# Patient Record
Sex: MALE | Race: WHITE | NOT HISPANIC OR LATINO | Employment: FULL TIME | URBAN - METROPOLITAN AREA
[De-identification: names, ages, dates, MRNs, and addresses within clinical notes are randomized per-mention and may not be internally consistent; named-entity substitution may affect disease eponyms.]

---

## 2017-02-23 ENCOUNTER — ALLSCRIPTS OFFICE VISIT (OUTPATIENT)
Dept: OTHER | Facility: OTHER | Age: 52
End: 2017-02-23

## 2017-02-23 DIAGNOSIS — Z00.00 ENCOUNTER FOR GENERAL ADULT MEDICAL EXAMINATION WITHOUT ABNORMAL FINDINGS: ICD-10-CM

## 2017-02-23 DIAGNOSIS — Z13.29 ENCOUNTER FOR SCREENING FOR OTHER SUSPECTED ENDOCRINE DISORDER: ICD-10-CM

## 2017-02-23 DIAGNOSIS — Z12.5 ENCOUNTER FOR SCREENING FOR MALIGNANT NEOPLASM OF PROSTATE: ICD-10-CM

## 2017-02-23 DIAGNOSIS — Z13.220 ENCOUNTER FOR SCREENING FOR LIPOID DISORDERS: ICD-10-CM

## 2017-02-25 ENCOUNTER — GENERIC CONVERSION - ENCOUNTER (OUTPATIENT)
Dept: OTHER | Facility: OTHER | Age: 52
End: 2017-02-25

## 2017-02-25 LAB
AMBIG ABBREV CMP14 DEFAULT (HISTORICAL): NORMAL
AMBIG ABBREV LP DEFAULT (HISTORICAL): NORMAL
BASOPHILS # BLD AUTO: 0 X10E3/UL (ref 0–0.2)
BASOPHILS # BLD AUTO: 1 %
DEPRECATED RDW RBC AUTO: 13.4 % (ref 12.3–15.4)
EOSINOPHIL # BLD AUTO: 0.1 X10E3/UL (ref 0–0.4)
EOSINOPHIL # BLD AUTO: 2 %
HCT VFR BLD AUTO: 49.2 % (ref 37.5–51)
HGB BLD-MCNC: 16.5 G/DL (ref 12.6–17.7)
IMM.GRANULOCYTES (CD4/8) (HISTORICAL): 0 %
IMM.GRANULOCYTES (CD4/8) (HISTORICAL): 0 X10E3/UL (ref 0–0.1)
LYMPHOCYTES # BLD AUTO: 1.7 X10E3/UL (ref 0.7–3.1)
LYMPHOCYTES # BLD AUTO: 30 %
MCH RBC QN AUTO: 30.7 PG (ref 26.6–33)
MCHC RBC AUTO-ENTMCNC: 33.5 G/DL (ref 31.5–35.7)
MCV RBC AUTO: 92 FL (ref 79–97)
MONOCYTES # BLD AUTO: 0.3 X10E3/UL (ref 0.1–0.9)
MONOCYTES (HISTORICAL): 5 %
NEUTROPHILS # BLD AUTO: 3.5 X10E3/UL (ref 1.4–7)
NEUTROPHILS # BLD AUTO: 62 %
PLATELET # BLD AUTO: 246 X10E3/UL (ref 150–379)
RBC (HISTORICAL): 5.37 X10E6/UL (ref 4.14–5.8)
WBC # BLD AUTO: 5.6 X10E3/UL (ref 3.4–10.8)

## 2017-02-26 LAB
A/G RATIO (HISTORICAL): 2.1 (ref 1.1–2.5)
ALBUMIN SERPL BCP-MCNC: 4.9 G/DL (ref 3.5–5.5)
ALP SERPL-CCNC: 74 IU/L (ref 39–117)
ALT SERPL W P-5'-P-CCNC: 29 IU/L (ref 0–44)
AST SERPL W P-5'-P-CCNC: 38 IU/L (ref 0–40)
BILIRUB SERPL-MCNC: 1.5 MG/DL (ref 0–1.2)
BUN SERPL-MCNC: 13 MG/DL (ref 6–24)
BUN/CREA RATIO (HISTORICAL): 13 (ref 9–20)
CALCIUM SERPL-MCNC: 10.3 MG/DL (ref 8.7–10.2)
CHLORIDE SERPL-SCNC: 100 MMOL/L (ref 96–106)
CHOLEST SERPL-MCNC: 217 MG/DL (ref 100–199)
CO2 SERPL-SCNC: 26 MMOL/L (ref 18–29)
CREAT SERPL-MCNC: 0.98 MG/DL (ref 0.76–1.27)
EGFR AFRICAN AMERICAN (HISTORICAL): 101 ML/MIN/1.73
EGFR-AMERICAN CALC (HISTORICAL): 88 ML/MIN/1.73
GLUCOSE SERPL-MCNC: 107 MG/DL (ref 65–99)
HDLC SERPL-MCNC: 54 MG/DL
LDLC SERPL CALC-MCNC: 123 MG/DL (ref 0–99)
POTASSIUM SERPL-SCNC: 5 MMOL/L (ref 3.5–5.2)
PROSTATE SPECIFIC ANTIGEN (HISTORICAL): 0.7 NG/ML (ref 0–4)
SODIUM SERPL-SCNC: 144 MMOL/L (ref 134–144)
TOT. GLOBULIN, SERUM (HISTORICAL): 2.3 G/DL (ref 1.5–4.5)
TOTAL PROTEIN (HISTORICAL): 7.2 G/DL (ref 6–8.5)
TRIGL SERPL-MCNC: 199 MG/DL (ref 0–149)
TSH SERPL DL<=0.05 MIU/L-ACNC: 0.8 UIU/ML (ref 0.45–4.5)
VLDLC SERPL CALC-MCNC: 40 MG/DL (ref 5–40)

## 2017-05-01 ENCOUNTER — GENERIC CONVERSION - ENCOUNTER (OUTPATIENT)
Dept: OTHER | Facility: OTHER | Age: 52
End: 2017-05-01

## 2017-08-08 ENCOUNTER — GENERIC CONVERSION - ENCOUNTER (OUTPATIENT)
Dept: OTHER | Facility: OTHER | Age: 52
End: 2017-08-08

## 2018-01-12 NOTE — MISCELLANEOUS
Message  GI Reminder Recall [address]:   Date: 08/08/2017   Dear Randy Sutherland:     Review of our records shows you are due for the following: Colonoscopy  Our records indicate that you are due at this time to have a follow-up examination for a colonoscopy  As you now, these tests are done to prevent colon cancer, a very common disease in the [place] and responsible for the thousands of patient deaths each year  We at [facility] are concerned for your health, and would very much appreciate you getting in touch with us at your earliest convenience, Again, this examination is vital to your proper health maintenance and for the prevention of cancer  Please call the following office to schedule your appointment:   [address] [place] [address], [address], [address] [place], [place] [address]remi Gordon [phone]  We look forward to hearing from you!      Sincerely,     [facility]      Signatures   Electronically signed by : Morris Malagon, ; Aug  8 2017  2:49PM EST                       (Author)

## 2018-01-16 NOTE — PROGRESS NOTES
Assessment    1  Encounter for preventive health examination (V70 0) (Z00 00)   2  Encounter for prostate cancer screening (V76 44) (Z12 5)   3  Lipid screening (V77 91) (Z13 220)   4  Thyroid disorder screen (V77 0) (Z13 29)   5  Heme positive stool (792 1) (R19 5)   6  Colon cancer screening (V76 51) (Z12 11)   7  SOB (shortness of breath) (786 05) (R06 02)    Plan  Colon cancer screening, Heme positive stool    · 1 - Dario Duenas MD (Gastroenterology) Physician Referral  Consult Only: the expectation  is that the referring provider will communicate back to the patient on treatment options  Evaluation and Treatment: the expectation is that the referred to provider will  communicate back to the patient on treatment options  Status: Active - Retrospective By  Protocol Authorization  Requested for: [id number]  Care Summary provided  : Yes   · COLONOSCOPY; Status:Active - Retrospective By Protocol Authorization; Requested  for:[id number];   Encounter for prostate cancer screening    · (1) PSA (SCREEN) (Dx V76 44 Screen for Prostate Cancer); Status:Active - Retrospective  Authorization; Requested for:[id number]; Health Maintenance    · (1) CBC/PLT/DIFF; Status:Active - Retrospective Authorization; Requested  for:[id number];    · (1) COMPREHENSIVE METABOLIC PANEL; Status:Active - Retrospective Authorization; Requested for:[id number];   Lipid screening    · (1) LIPID PANEL, FASTING; Status:Active - Retrospective Authorization; Requested  for:[id number];   Rotator cuff tear    · Begin or continue regular aerobic exercise  Gradually work up to at least count1  sessions of dur1 of exercise a week ; Status:Complete;   Done: 54YXN7530   · We recommend that you bring your body mass index down to 26 ; Status:Complete;    Done: 23FEL7134   · You need to quit smoking ; Status:Complete;   Done: 67EUO4578  SOB (shortness of breath)    · EKG/ECG- POC; Status:Active - Perform Order,Retrospective By Protocol Authorization;   Requested for: [id number]; Thyroid disorder screen    · (1) TSH; Status:Active - Retrospective Authorization; Requested for:[id number]; Check EKG, BW  Colonoscopy referral      Discussion/Summary    1  HM-needs BW, colonoscopy  Advised Tdap  2  Heme positive stool-needs colonoscopy  3  SOB-probably deconditioning  Advised weight loss and exercise  4  Daily headaches-advised hydration  Recheck if persists  History of Present Illness  HM, Adult Male: The patient is being seen for a health maintenance evaluation  General Health: The patient's health since the last visit is described as good  He has regular dental visits  He complains of vision problems  Vision care includes an eye examination within the last year  He denies hearing loss  Immunizations status:  Dr Ish Navarro  Lifestyle:  He consumes a diverse and healthy diet  (Eats fruits, vegetables, whole grains  Limits red meat  Fish 1-2 per week  Dietary calcium drinks Lactaid  Coffee 1 daily  No soda  )   He does not exercise regularly  (plans on going back to the gym)   He uses tobacco  The patient Quit smoking cigarettes  Tobacco Use Duration: 1 cigar(s) per day  He consumes alcohol  He reports occasional alcohol use and drinking several drinks per week  Screening:   HPI: Trinidad Head has been in good health  He has no hospitalitions, surgeries, fractures  Thinks he broke his elbow a few years ago  No treatment  He had ulcerative colitis at age 3  He was hospitalized at [facility] for several months  Was on steroids  He had a colonoscopy at ages 34 and 37  Thinks Dr Hansa Bolton  Had 1 polyp? Saw dermatology Dr Abby Lyn ; had some facial lesions   No recent BW  Review of Systems    Cardiovascular: no chest pain and no palpitations  Respiratory: shortness of breath and Thinks related to being overweight  Genitourinary: no dysuria, no urinary hesitancy and no nocturia  Musculoskeletal: Has a history of low back for years   Had a sledding accident many years ago  Takes Percocet for flareups  , but no arthralgias  Active Problems    1  Frequent headaches (784 0) (R51)   2  Rotator cuff tear (840 4) (M75 100)    Family History  Mother    · Family history of lung cancer (V16 1) (Z80 1)  Father    · Family history of arthritis (V17 7) (Z82 61)  Grandmother    · Family history of cardiac disorder (V17 49) (Z82 49)    Social History    · Current every day smoker (305 1) (F17 200)   · Drinks coffee   · Occupation   ·   · Social drinker (V49 89) (Z78 9)    Current Meds   1  Endocet 5-325 MG Oral Tablet Recorded   2  Tylenol 325 MG Oral Tablet; TAKE 1 TO 2 TABLETS EVERY 6 HOURS AS NEEDED   Recorded    Allergies    1  No Known Drug Allergies    Vitals   Recorded: 51Ook6988 10:47PM Recorded: 14Tld8781 06:35PM   Heart Rate  90   Respiration  16   Systolic 857 760   Diastolic 78 86   Height  5 ft 7 in   Weight  185 lb    BMI Calculated  28 97   BSA Calculated  1 96     Physical Exam    Constitutional   General appearance: No acute distress, well appearing and well nourished  Head and Face   Head and face: Normal     Eyes   Conjunctiva and lids: No erythema, swelling or discharge  Pupils and irises: Equal, round, reactive to light  Ears, Nose, Mouth, and Throat   Otoscopic examination: Tympanic membranes translucent with normal light reflex  Canals patent without erythema  Lips, teeth, and gums: Normal, good dentition  Oropharynx: Normal with no erythema, edema, exudate or lesions  Neck   Neck: Supple, symmetric, trachea midline, no masses  Thyroid: Normal, no thyromegaly  Pulmonary   Respiratory effort: No increased work of breathing or signs of respiratory distress  Auscultation of lungs: Clear to auscultation  Cardiovascular   Auscultation of heart: Normal rate and rhythm, normal S1 and S2, no murmurs  Carotid pulses: 2+ bilaterally  Abdominal aorta: Normal     Femoral pulses: 2+ bilaterally      Pedal pulses: 2+ bilaterally  Peripheral vascular exam: Normal     Examination of extremities for edema and/or varicosities: Normal     Abdomen   Abdomen: Non-tender, no masses  Liver and spleen: No hepatomegaly or splenomegaly  Anus, perineum, and rectum: Normal sphincter tone, no masses, no prolapse  Stool sample for occult blood: Abnormal   The stool was positive for occult blood  Genitourinary   Digital rectal exam of prostate: Normal size, no masses  Lymphatic   Palpation of lymph nodes in neck: No lymphadenopathy  Palpation of lymph nodes in groin: No lymphadenopathy  Skin   Skin and subcutaneous tissue: Normal without rashes or lesions  Psychiatric   Judgment and insight: Normal     Mood and affect: Normal        Future Appointments    Date/Time Provider Specialty Site   03/01/2018 06:30 PM RAHAT Yeung   [address] [facility]     Signatures   Electronically signed by : RAHAT Castellanos ; Feb 23 2017 10:48PM EST                       (Author)

## 2018-01-22 VITALS
DIASTOLIC BLOOD PRESSURE: 78 MMHG | RESPIRATION RATE: 16 BRPM | SYSTOLIC BLOOD PRESSURE: 122 MMHG | HEART RATE: 90 BPM | WEIGHT: 185 LBS | BODY MASS INDEX: 29.03 KG/M2 | HEIGHT: 67 IN

## 2018-03-01 ENCOUNTER — APPOINTMENT (OUTPATIENT)
Dept: RADIOLOGY | Facility: MEDICAL CENTER | Age: 53
End: 2018-03-01
Payer: COMMERCIAL

## 2018-03-01 ENCOUNTER — OFFICE VISIT (OUTPATIENT)
Dept: FAMILY MEDICINE CLINIC | Facility: MEDICAL CENTER | Age: 53
End: 2018-03-01
Payer: COMMERCIAL

## 2018-03-01 VITALS
HEART RATE: 72 BPM | RESPIRATION RATE: 16 BRPM | SYSTOLIC BLOOD PRESSURE: 140 MMHG | DIASTOLIC BLOOD PRESSURE: 86 MMHG | WEIGHT: 186 LBS | BODY MASS INDEX: 29.13 KG/M2

## 2018-03-01 DIAGNOSIS — M79.671 FOOT PAIN, RIGHT: ICD-10-CM

## 2018-03-01 DIAGNOSIS — M25.551 HIP PAIN, ACUTE, RIGHT: Primary | ICD-10-CM

## 2018-03-01 DIAGNOSIS — E66.3 OVERWEIGHT: ICD-10-CM

## 2018-03-01 DIAGNOSIS — Z00.00 ROUTINE ADULT HEALTH MAINTENANCE: ICD-10-CM

## 2018-03-01 PROCEDURE — 73502 X-RAY EXAM HIP UNI 2-3 VIEWS: CPT

## 2018-03-01 PROCEDURE — 99396 PREV VISIT EST AGE 40-64: CPT | Performed by: FAMILY MEDICINE

## 2018-03-01 PROCEDURE — 73630 X-RAY EXAM OF FOOT: CPT

## 2018-03-01 NOTE — PROGRESS NOTES
Rupail Du is here for CPX  He complains of pain in both feet, right hip ,left elbow  Left elbow aggravated by lifting things  No swelling  Thinks he had fracture of his left elbow  A ladder collapsed on him  Right hip aggravated by walking   No pain at night  Feels a little stiff  Started recently  Both feet hurt when he gets up in the morning  GM had Rheumatoid arthritis  NO history  of psoriasis  HH: Eats fruits and vegetables  Caffeine 1 coffee daily  Alcohol 1-2 daily  Soda none  Exercise none regular; plans on getting back to the gym  Cigar smoker 1 daily  FH : Positive for stroke  Lung cancer  NO prostate cancer or colon cancer  Eye checkup Dec 2017    Dental checkup  Has hearing loss   I have reviewed the patient's medical history in detail and updated the computerized patient record  Review of Systems   Constitutional: Negative for fatigue  HENT: Negative for dental problem and tinnitus  Respiratory: Negative for cough, chest tightness and shortness of breath  Cardiovascular: Negative for chest pain and palpitations  Gastrointestinal: Negative  Still with discharge with boil on his buttock  Comes and goes  Genitourinary: Negative for decreased urine volume, difficulty urinating and frequency  Neurological: Negative for dizziness, syncope and headaches  Psychiatric/Behavioral: Negative for dysphoric mood  The patient is not nervous/anxious  O: /86 (Cuff Size: Standard)   Pulse 72   Resp 16   Wt 84 4 kg (186 lb)   BMI 29 13 kg/m²   BP by me 132/84  Physical Exam   Constitutional: He is oriented to person, place, and time  He appears well-developed and well-nourished  HENT:   Head: Normocephalic  Right Ear: External ear normal    Left Ear: External ear normal    Mouth/Throat: Oropharynx is clear and moist  No oropharyngeal exudate  Eyes: EOM are normal  Pupils are equal, round, and reactive to light  Neck: No thyromegaly present     Cardiovascular: Normal rate, regular rhythm, normal heart sounds and intact distal pulses  No bruits   Pulmonary/Chest: Effort normal and breath sounds normal    Abdominal: Soft  Bowel sounds are normal  He exhibits no mass  There is no hepatomegaly  There is no tenderness  Genitourinary: Prostate normal    Musculoskeletal: He exhibits no edema  Left elbow shows some tenderness over the left lateral epicondyle  No crepitus  Full range of motion  Right hip shows normal internal external rotation  No tenderness is palpable laterally  Both feet showed no tenderness over the plantar surface  There is some tenderness over the right MTP joint  No swelling no erythema  Lymphadenopathy:     He has no cervical adenopathy  Neurological: He is alert and oriented to person, place, and time  Skin: No rash noted  Assessment  1  Overweight-discussed diet and exercise  2   2   Left elbow pain-suspect lateral epicondylitis  Will avoid repetitive movements  Ice ibuprofen as needed  Could consider referral to Ortho if desired  3   Right hip pain-will check an x-ray; does not act like a bursitis  4  Right foot pain-due MTP joint pain will consider gout however may be arthritis  5  History of colitis-follow up with GI  Colonoscopy next year  6   HM-due for PSA and blood work  Plan  Check x-rays of the right hip and right foot  Check blood work including uric acid

## 2018-03-08 ENCOUNTER — TELEPHONE (OUTPATIENT)
Dept: FAMILY MEDICINE CLINIC | Facility: MEDICAL CENTER | Age: 53
End: 2018-03-08

## 2018-03-08 NOTE — TELEPHONE ENCOUNTER
----- Message from Jayden Long MD sent at 3/8/2018  9:57 AM EST -----  Notify the x-rays of his hip and foot are normal   We had talked about looking at a uric acid level to consider gout so we will await his blood work  If his uric acid is normal suggest he see Podiatry as we discussed  If the right hip and elbow pain persists would recommend referral to general Orthopedics

## 2018-03-12 LAB
HBA1C MFR BLD HPLC: 5.5 %
HBA1C MFR BLD HPLC: 5.5 %

## 2018-03-14 LAB
ALBUMIN SERPL-MCNC: 4.6 G/DL (ref 3.5–5.5)
ALBUMIN/GLOB SERPL: 1.9 {RATIO} (ref 1.2–2.2)
ALP SERPL-CCNC: 70 IU/L (ref 39–117)
ALT SERPL-CCNC: 16 IU/L (ref 0–44)
AMBIG ABBREV DEFAULT: NORMAL
AMBIG ABBREV DEFAULT: NORMAL
AST SERPL-CCNC: 26 IU/L (ref 0–40)
BASOPHILS # BLD AUTO: 0 X10E3/UL (ref 0–0.2)
BASOPHILS NFR BLD AUTO: 0 %
BILIRUB SERPL-MCNC: 0.9 MG/DL (ref 0–1.2)
BUN SERPL-MCNC: 19 MG/DL (ref 6–24)
BUN/CREAT SERPL: 21 (ref 9–20)
CALCIUM SERPL-MCNC: 10.1 MG/DL (ref 8.7–10.2)
CHLORIDE SERPL-SCNC: 102 MMOL/L (ref 96–106)
CHOLEST SERPL-MCNC: 177 MG/DL (ref 100–199)
CO2 SERPL-SCNC: 27 MMOL/L (ref 18–29)
CREAT SERPL-MCNC: 0.9 MG/DL (ref 0.76–1.27)
EOSINOPHIL # BLD AUTO: 0.1 X10E3/UL (ref 0–0.4)
EOSINOPHIL NFR BLD AUTO: 2 %
ERYTHROCYTE [DISTWIDTH] IN BLOOD BY AUTOMATED COUNT: 13.7 % (ref 12.3–15.4)
EST. AVERAGE GLUCOSE BLD GHB EST-MCNC: 111 MG/DL
GLOBULIN SER-MCNC: 2.4 G/DL (ref 1.5–4.5)
GLUCOSE SERPL-MCNC: 102 MG/DL (ref 65–99)
HBA1C MFR BLD: 5.5 % (ref 4.8–5.6)
HCT VFR BLD AUTO: 46.2 % (ref 37.5–51)
HDLC SERPL-MCNC: 53 MG/DL
HGB BLD-MCNC: 15.3 G/DL (ref 13–17.7)
IMM GRANULOCYTES # BLD: 0 X10E3/UL (ref 0–0.1)
IMM GRANULOCYTES NFR BLD: 0 %
LDLC SERPL CALC-MCNC: 103 MG/DL (ref 0–99)
LYMPHOCYTES # BLD AUTO: 1.5 X10E3/UL (ref 0.7–3.1)
LYMPHOCYTES NFR BLD AUTO: 29 %
MCH RBC QN AUTO: 30.5 PG (ref 26.6–33)
MCHC RBC AUTO-ENTMCNC: 33.1 G/DL (ref 31.5–35.7)
MCV RBC AUTO: 92 FL (ref 79–97)
MONOCYTES # BLD AUTO: 0.3 X10E3/UL (ref 0.1–0.9)
MONOCYTES NFR BLD AUTO: 7 %
NEUTROPHILS # BLD AUTO: 3 X10E3/UL (ref 1.4–7)
NEUTROPHILS NFR BLD AUTO: 62 %
PLATELET # BLD AUTO: 249 X10E3/UL (ref 150–379)
POTASSIUM SERPL-SCNC: 5 MMOL/L (ref 3.5–5.2)
PROT SERPL-MCNC: 7 G/DL (ref 6–8.5)
PSA SERPL-MCNC: 0.6 NG/ML (ref 0–4)
RBC # BLD AUTO: 5.02 X10E6/UL (ref 4.14–5.8)
SL AMB EGFR AFRICAN AMERICAN: 112 ML/MIN/1.73
SL AMB EGFR NON AFRICAN AMERICAN: 96 ML/MIN/1.73
SL AMB VLDL CHOLESTEROL CALC: 21 MG/DL (ref 5–40)
SODIUM SERPL-SCNC: 142 MMOL/L (ref 134–144)
TRIGL SERPL-MCNC: 105 MG/DL (ref 0–149)
TSH SERPL DL<=0.005 MIU/L-ACNC: 0.88 UIU/ML (ref 0.45–4.5)
URATE SERPL-MCNC: 5.6 MG/DL (ref 3.7–8.6)
WBC # BLD AUTO: 5 X10E3/UL (ref 3.4–10.8)

## 2019-01-09 ENCOUNTER — TELEPHONE (OUTPATIENT)
Dept: FAMILY MEDICINE CLINIC | Facility: MEDICAL CENTER | Age: 54
End: 2019-01-09

## 2019-01-09 DIAGNOSIS — Z13.0 SCREENING FOR DISORDER OF BLOOD AND BLOOD-FORMING ORGANS: Primary | ICD-10-CM

## 2019-01-09 DIAGNOSIS — Z12.5 PROSTATE CANCER SCREENING: ICD-10-CM

## 2019-01-09 DIAGNOSIS — Z13.29 THYROID DISORDER SCREEN: ICD-10-CM

## 2019-01-09 DIAGNOSIS — Z13.220 LIPID SCREENING: ICD-10-CM

## 2019-01-10 NOTE — TELEPHONE ENCOUNTER
LMOM with information below, patient was advised to call the office with any questions or concerns     Orders placed

## 2019-03-07 ENCOUNTER — OFFICE VISIT (OUTPATIENT)
Dept: FAMILY MEDICINE CLINIC | Facility: MEDICAL CENTER | Age: 54
End: 2019-03-07
Payer: COMMERCIAL

## 2019-03-07 VITALS
SYSTOLIC BLOOD PRESSURE: 130 MMHG | BODY MASS INDEX: 27.06 KG/M2 | DIASTOLIC BLOOD PRESSURE: 82 MMHG | HEART RATE: 70 BPM | RESPIRATION RATE: 14 BRPM | HEIGHT: 67 IN | WEIGHT: 172.38 LBS

## 2019-03-07 DIAGNOSIS — Z12.11 COLON CANCER SCREENING: Primary | ICD-10-CM

## 2019-03-07 PROCEDURE — 99396 PREV VISIT EST AGE 40-64: CPT | Performed by: FAMILY MEDICINE

## 2019-03-08 NOTE — PROGRESS NOTES
Shilpi Mesisna is here for CPX  Medical history unchanged  He says the joint pain got better  See notes last visit  HH: He joined the gym  ; tries to go 5 days a week  He lost 16 lbs  He cut back on the snack mika the nighttime eating  Quit  Smoking cigars  Occ coffee  No soda  Alcohol 1-2 drinks a day  FH: Father with heart disease  Mother with lung and breast cancer  No prostate cancer  SH Lives with daughter and wife  Colonoscopy 2017 apparently there was some inflammation in the left side of the colon and a follow-up was advised 1-2 years  Dr Josue Jaime    O: /82 (BP Location: Right arm, Patient Position: Sitting, Cuff Size: Standard)   Pulse 70   Resp 14   Ht 5' 7" (1 702 m)   Wt 78 2 kg (172 lb 6 oz)   BMI 27 00 kg/m²   Physical Exam   Constitutional: He is oriented to person, place, and time  He appears well-developed and well-nourished  HENT:   Head: Normocephalic  Right Ear: External ear normal    Left Ear: External ear normal    Mouth/Throat: Oropharynx is clear and moist    Eyes: Pupils are equal, round, and reactive to light  Conjunctivae and EOM are normal  No scleral icterus  Neck: Normal range of motion  Neck supple  Carotid bruit is not present  No thyromegaly present  Cardiovascular: Normal rate, regular rhythm, normal heart sounds and intact distal pulses  Pulmonary/Chest: Effort normal and breath sounds normal    Abdominal: Soft  Normal aorta and bowel sounds are normal  He exhibits no mass  There is no hepatosplenomegaly  There is no tenderness  No hernia  Genitourinary: Prostate normal  Rectal exam shows no mass and guaiac negative stool  Musculoskeletal: Normal range of motion  He exhibits no edema  Lymphadenopathy:        Head (right side): No submandibular and no occipital adenopathy present  Head (left side): No submandibular and no occipital adenopathy present  He has no cervical adenopathy          Right: No inguinal and no supraclavicular adenopathy present  Left: No inguinal and no supraclavicular adenopathy present  Neurological: He is alert and oriented to person, place, and time  Skin: No lesion and no rash noted  Psychiatric: He has a normal mood and affect  His behavior is normal      Assessment  1  Health maintenance-up-to-date  Due for colonoscopy  Had Tdap  DiscussedShingrix  2  Overweight-congratulated on his 16 lb weight loss as well as his continued efforts with regular exercise  Plan  Check blood work  Recheck 1 year

## 2019-10-12 PROBLEM — G89.29 CHRONIC LEFT SHOULDER PAIN: Status: ACTIVE | Noted: 2019-10-12

## 2019-10-12 PROBLEM — M25.512 CHRONIC LEFT SHOULDER PAIN: Status: ACTIVE | Noted: 2019-10-12

## 2019-10-15 VITALS
BODY MASS INDEX: 26.21 KG/M2 | WEIGHT: 167 LBS | DIASTOLIC BLOOD PRESSURE: 96 MMHG | SYSTOLIC BLOOD PRESSURE: 135 MMHG | HEIGHT: 67 IN | HEART RATE: 84 BPM

## 2019-10-15 DIAGNOSIS — M25.512 ARTHRALGIA OF LEFT ACROMIOCLAVICULAR JOINT: ICD-10-CM

## 2019-10-15 DIAGNOSIS — M25.512 ACUTE PAIN OF LEFT SHOULDER: Primary | ICD-10-CM

## 2019-10-15 PROCEDURE — 99203 OFFICE O/P NEW LOW 30 MIN: CPT | Performed by: PHYSICAL MEDICINE & REHABILITATION

## 2019-10-15 NOTE — PROGRESS NOTES
1  Acute pain of left shoulder  Ambulatory referral to Physical Therapy   2  Arthralgia of left acromioclavicular joint       Orders Placed This Encounter   Procedures    Ambulatory referral to Physical Therapy        Imaging Studies (I personally reviewed images in PACS and report):  Left shoulder x-rays dated 10/2/2019  These images show arthritic changes of the acromioclavicular joint  No evidence of acute osseous abnormality  Impression:  Left shoulder pain likely secondary to acromioclavicular joint arthropathy and less likely due to rotator cuff impingement  We discussed different treatment options and decided to proceed with physical therapy  He will take Tylenol as needed for pain  Of note, he had an injection by a family member who is an orthopedic surgeon  It sounds like it was a subacromial injection but I asked him to find out what it was targeted for  If he continues to have pain, and he did not have an AC joint injection through a family member, we can consider doing this on his next visit if he still has trouble  No follow-ups on file  HPI:  Roby Escalante is a 62 y o  male  who presents for evaluation of   Chief Complaint   Patient presents with    Left Shoulder - Pain       Onset/Mechanism: 10/2/2019- 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  Location: Over the lateral acromion  Quality: Aching  Radiation: There is pain that goes down to the wrist   Provocative: Any activities that involve the left shoulder  Severity: Pretty bad  Associated Symptoms: Weakness because of pain  Treatment so far: Steroid injection into the shoulder but he is not sure where  This was done by his brother who is an orthopedic hand surgeon  Review of Systems   Constitutional: Positive for activity change  Negative for fever  HENT: Negative for sore throat  Eyes: Negative for visual disturbance  Respiratory: Negative for shortness of breath  Cardiovascular: Negative for chest pain  Gastrointestinal: Negative for abdominal pain  Endocrine: Negative for polydipsia  Genitourinary: Negative for difficulty urinating  Musculoskeletal: Positive for arthralgias and joint swelling  Skin: Negative for rash  Allergic/Immunologic: Negative for immunocompromised state  Neurological: Positive for weakness  Negative for numbness  Hematological: Does not bruise/bleed easily  Psychiatric/Behavioral: Negative for confusion  Following history reviewed and updated:  History reviewed  No pertinent past medical history  Past Surgical History:   Procedure Laterality Date    COLONOSCOPY      Fiberoptic     Social History   Social History     Substance and Sexual Activity   Alcohol Use Yes    Comment: Social     Social History     Substance and Sexual Activity   Drug Use Yes    Types: Marijuana     Social History     Tobacco Use   Smoking Status Former Smoker     Family History   Problem Relation Age of Onset    Lung cancer Mother     Arthritis Father     Heart disease Family         Cardiac Disorder     No Known Allergies     Constitutional:  /96 (BP Location: Right arm, Patient Position: Sitting, Cuff Size: Standard)   Pulse 84   Ht 5' 7" (1 702 m)   Wt 75 8 kg (167 lb)   BMI 26 16 kg/m²    General: NAD  Eyes: Anicteric sclerae  Neck: Supple  Lungs: Unlabored breathing  Cardiovascular: No lower extremity edema  Skin: Intact without erythema  Neurologic: Sensation intact to light touch  Psychiatric: Mood and affect are appropriate  Left Shoulder Exam     Tenderness   The patient is experiencing tenderness in the acromioclavicular joint and acromion  Range of Motion   The patient has normal left shoulder ROM      Muscle Strength   Abduction: 4/5     Tests   Levy test: positive  Cross arm: positive  Impingement: positive  Sulcus: absent    Other   Erythema: absent  Scars: absent  Sensation: normal  Pulse: present Procedures - none for this visit

## 2019-10-22 LAB
ALBUMIN SERPL-MCNC: 4.8 G/DL (ref 3.5–5.5)
ALBUMIN/GLOB SERPL: 2.3 {RATIO} (ref 1.2–2.2)
ALP SERPL-CCNC: 62 IU/L (ref 39–117)
ALT SERPL-CCNC: 22 IU/L (ref 0–44)
AST SERPL-CCNC: 27 IU/L (ref 0–40)
BASOPHILS # BLD AUTO: 0 X10E3/UL (ref 0–0.2)
BASOPHILS NFR BLD AUTO: 0 %
BILIRUB SERPL-MCNC: 1.5 MG/DL (ref 0–1.2)
BUN SERPL-MCNC: 19 MG/DL (ref 6–24)
BUN/CREAT SERPL: 19 (ref 9–20)
CALCIUM SERPL-MCNC: 9.9 MG/DL (ref 8.7–10.2)
CHLORIDE SERPL-SCNC: 98 MMOL/L (ref 96–106)
CHOLEST SERPL-MCNC: 192 MG/DL (ref 100–199)
CO2 SERPL-SCNC: 25 MMOL/L (ref 20–29)
CREAT SERPL-MCNC: 1.01 MG/DL (ref 0.76–1.27)
EOSINOPHIL # BLD AUTO: 0.3 X10E3/UL (ref 0–0.4)
EOSINOPHIL NFR BLD AUTO: 4 %
ERYTHROCYTE [DISTWIDTH] IN BLOOD BY AUTOMATED COUNT: 14.6 % (ref 12.3–15.4)
GLOBULIN SER-MCNC: 2.1 G/DL (ref 1.5–4.5)
GLUCOSE SERPL-MCNC: 105 MG/DL (ref 65–99)
HCT VFR BLD AUTO: 47.4 % (ref 37.5–51)
HDLC SERPL-MCNC: 72 MG/DL
HGB BLD-MCNC: 15.8 G/DL (ref 13–17.7)
IMM GRANULOCYTES # BLD: 0 X10E3/UL (ref 0–0.1)
IMM GRANULOCYTES NFR BLD: 0 %
LDLC SERPL CALC-MCNC: 100 MG/DL (ref 0–99)
LYMPHOCYTES # BLD AUTO: 1.5 X10E3/UL (ref 0.7–3.1)
LYMPHOCYTES NFR BLD AUTO: 19 %
MCH RBC QN AUTO: 31.3 PG (ref 26.6–33)
MCHC RBC AUTO-ENTMCNC: 33.3 G/DL (ref 31.5–35.7)
MCV RBC AUTO: 94 FL (ref 79–97)
MONOCYTES # BLD AUTO: 0.4 X10E3/UL (ref 0.1–0.9)
MONOCYTES NFR BLD AUTO: 5 %
NEUTROPHILS # BLD AUTO: 5.8 X10E3/UL (ref 1.4–7)
NEUTROPHILS NFR BLD AUTO: 72 %
PLATELET # BLD AUTO: 307 X10E3/UL (ref 150–450)
POTASSIUM SERPL-SCNC: 4.8 MMOL/L (ref 3.5–5.2)
PROT SERPL-MCNC: 6.9 G/DL (ref 6–8.5)
PSA SERPL-MCNC: 0.6 NG/ML (ref 0–4)
RBC # BLD AUTO: 5.05 X10E6/UL (ref 4.14–5.8)
SL AMB EGFR AFRICAN AMERICAN: 96 ML/MIN/1.73
SL AMB EGFR NON AFRICAN AMERICAN: 83 ML/MIN/1.73
SL AMB VLDL CHOLESTEROL CALC: 20 MG/DL (ref 5–40)
SODIUM SERPL-SCNC: 139 MMOL/L (ref 134–144)
TRIGL SERPL-MCNC: 98 MG/DL (ref 0–149)
TSH SERPL DL<=0.005 MIU/L-ACNC: 1.39 UIU/ML (ref 0.45–4.5)
WBC # BLD AUTO: 8.1 X10E3/UL (ref 3.4–10.8)

## 2019-10-23 ENCOUNTER — EVALUATION (OUTPATIENT)
Dept: PHYSICAL THERAPY | Facility: MEDICAL CENTER | Age: 54
End: 2019-10-23
Payer: OTHER MISCELLANEOUS

## 2019-10-23 DIAGNOSIS — M25.512 ACUTE PAIN OF LEFT SHOULDER: Primary | ICD-10-CM

## 2019-10-23 PROCEDURE — 97161 PT EVAL LOW COMPLEX 20 MIN: CPT | Performed by: PHYSICAL THERAPIST

## 2019-10-23 PROCEDURE — G8990 OTHER PT/OT CURRENT STATUS: HCPCS | Performed by: PHYSICAL THERAPIST

## 2019-10-23 PROCEDURE — G8991 OTHER PT/OT GOAL STATUS: HCPCS | Performed by: PHYSICAL THERAPIST

## 2019-10-23 PROCEDURE — 97110 THERAPEUTIC EXERCISES: CPT | Performed by: PHYSICAL THERAPIST

## 2019-10-23 NOTE — PROGRESS NOTES
PT Evaluation     Today's date: 10/23/2019  Patient name: Chely Giron  : 1965  MRN: [id number]  Referring provider: Ankita Bauman DO  Dx:   Encounter Diagnosis     ICD-10-CM    1  Acute pain of left shoulder M25 512 Ambulatory referral to Physical Therapy                  Assessment  Assessment details: Pt is an alert and oriented 61 yo male, referred to out-pt PT with dx of acute L shoulder pain  Pt states injury occurred at work on 10/2/19 when he slipped on oil and landed on his L shoulder  Pt received injection which he states gave him mild relief with  X-rays show AC joint arthritis  Pt states most pain with reaching out to side and overhead  Pt states he has been working since injury, but fortunately work has been slow  Upon examination, pt demonstrates pain, tissue tenderness, decreased L shoulder strength and ROM, and (+) impingement tests and (+) bicep testing  Suspect possible prox bicep tear  Pt will benefit from skilled PT to address the deficits listed above    Thank you for your referral   Impairments: abnormal or restricted ROM, activity intolerance, impaired physical strength, lacks appropriate home exercise program, pain with function and scapular dyskinesis  Understanding of Dx/Px/POC: good   Prognosis: good    Plan  Patient would benefit from: skilled physical therapy  Planned modality interventions: thermotherapy: hydrocollator packs and cryotherapy  Planned therapy interventions: manual therapy, joint mobilization, neuromuscular re-education, postural training, patient education, therapeutic exercise, therapeutic activities, stretching, strengthening and home exercise program  Frequency: 2x week  Duration in visits: 12  Duration in weeks: 8  Plan of Care beginning date: 10/23/2019  Plan of Care expiration date: 2019  Treatment plan discussed with: patient        Subjective Evaluation    History of Present Illness  Mechanism of injury: trauma  Mechanism of injury: Pt fell on L shoulder at work on 10/2/19  Pain  Current pain ratin  At best pain ratin  At worst pain ratin  Quality: sharp  Relieving factors: ice  Aggravating factors: overhead activity    Hand dominance: right      Diagnostic Tests  X-ray: abnormal  Treatments  Current treatment: injection treatment and physical therapy  Patient Goals  Patient goals for therapy: decreased pain, increased strength and return to work          Objective     Observations     Additional Observation Details  Posture/observation: generally conditioned 61 yo male, mild forward head posture and scap protraction in sitting    Palpation     Additional Palpation Details  Palpation:  Pain/tenderness L ant shoulder/RTC insertion site and long head of bicep    Neurological Testing     Sensation     Shoulder   Left Shoulder   Intact: light touch    Right Shoulder   Intact: light touch    Additional Neurological Details  Pt reports radicular pain into L UE, mika L bicep    Active Range of Motion   Left Shoulder   Flexion: 108 degrees with pain  Extension: 55 degrees   Abduction: 75 degrees with pain  External rotation BTH: C4   Internal rotation BTB: T10     Right Shoulder   Flexion: 156 degrees   Extension: 60 degrees   Abduction: 162 degrees     Additional Active Range of Motion Details  Pt denies pain with IR/ER, and actually states those positions feel make pain better    Strength/Myotome Testing     Left Shoulder     Planes of Motion   Flexion: 4-   Extension: 5   Abduction: 4   Adduction: 4   External rotation at 0°: 4   Internal rotation at 0°: 4-     Isolated Muscles   Biceps: 3+   Triceps: 4-     Right Shoulder   Normal muscle strength    Isolated Muscles   Biceps: 4     Tests     Left Shoulder   Positive Hawkin's, Neer's and Speed's  Negative empty can                Precautions: None      Manual                                                                                   Exercise Diary  10/23            jessica AAROM 4-way x10ea            Wall slides 10"x10            Table slides             digiflex             wrist flex/ext 2# nv            AROM When able            IR stretch                                                                                                                                                                             Modalities  10/23            CP

## 2019-10-30 ENCOUNTER — OFFICE VISIT (OUTPATIENT)
Dept: PHYSICAL THERAPY | Facility: MEDICAL CENTER | Age: 54
End: 2019-10-30
Payer: OTHER MISCELLANEOUS

## 2019-10-30 DIAGNOSIS — M25.512 ACUTE PAIN OF LEFT SHOULDER: Primary | ICD-10-CM

## 2019-10-30 PROCEDURE — 97112 NEUROMUSCULAR REEDUCATION: CPT

## 2019-10-30 PROCEDURE — 97110 THERAPEUTIC EXERCISES: CPT

## 2019-10-30 NOTE — PROGRESS NOTES
Daily Note     Today's date: 10/30/2019  Patient name: Freida Ortiz  : 1965  MRN: [id number]  Referring provider: Elsie Leo DO  Dx:   Encounter Diagnosis     ICD-10-CM    1  Acute pain of left shoulder M25 512        Start Time: 1130  Stop Time: 1207  Total time in clinic (min): 37 minutes    Subjective: Patient states, "I went to the gym before this and I had min shoulder pain with bicep curls and triceps ext"  Objective: See treatment diary below    Assessment: Tolerated treatment fair  Patient would benefit from continued PT  Patient had pain with active shoulder abd (~45-90°) the rest of the range was "no problem"  Otherwise, no increase in pain during therapy today  His pain seems to be at his long head biceps origin  Plan: Continue per plan of care        Precautions: None    Manual                                                                                 Exercise Diary  10/23 10/30           Pulleys  5'           AAROM 4-way x10ea 10"  x10           Wall slides 10"x10 10"  x10           Table slides             digiflex  Black  5"x10           wrist flex/ext 2# nv 5#  3x10           AROM When able Flex/abd  x1 ea           IR stretch  10"x5                                                                                                                                                                         Modalities  10/23 10/30           CP  home

## 2019-11-04 ENCOUNTER — OFFICE VISIT (OUTPATIENT)
Dept: PHYSICAL THERAPY | Facility: MEDICAL CENTER | Age: 54
End: 2019-11-04
Payer: OTHER MISCELLANEOUS

## 2019-11-04 DIAGNOSIS — M25.512 ACUTE PAIN OF LEFT SHOULDER: Primary | ICD-10-CM

## 2019-11-04 PROCEDURE — 97110 THERAPEUTIC EXERCISES: CPT

## 2019-11-04 PROCEDURE — 97112 NEUROMUSCULAR REEDUCATION: CPT

## 2019-11-04 NOTE — PROGRESS NOTES
Daily Note     Today's date: 2019  Patient name: Cheli Santo  : 1965  MRN: [id number]  Referring provider: Martin Khoury DO  Dx:   Encounter Diagnosis     ICD-10-CM    1  Acute pain of left shoulder M25 512        Subjective: Patient reports after last session his bicep was very sore  Soreness lasted for about 2x days  He states he was at the gym this am and had pain in shoulder with certain UE movements  Objective: See treatment diary below    Assessment: Pt tolerated treatment fair  Pt continues to have pain with active shoulder abduction and flexion,noted at 90 degrees  Stretching and mild discomfort noted at end ranges of ARROM exercise  Pain continues to primarily be present in L bicep  Pt educated to be mindful of exercises at the gym and to not overdo UE exercise  Pt deffered use of CP in [facility], instructed pt to ice at home to improve sx's of soreness  Plan: Continue per plan of care      Pt 1:1 with PTA JW 10:30-11:00     Precautions: None    Manual                                                                                 Exercise Diary  10/23 10/30 11/4          Pulleys  5' 5'          AAROM 4-way x10ea 10"  x10 10"x10           Wall slides 10"x10 10"  x10 10"x10          Table slides   Flex/scap  10"x10          digiflex  Black  5"x10 Black  5"x15          wrist flex/ext 2# nv 5#  3x10 5#  3x10          AROM When able Flex/abd  x1 ea Flex/abd  x5 ea           IR stretch  10"x5 10'x5                                                                                                                                                                         Modalities  10/23 10/30 11/4          CP  home home

## 2019-11-06 ENCOUNTER — OFFICE VISIT (OUTPATIENT)
Dept: PHYSICAL THERAPY | Facility: MEDICAL CENTER | Age: 54
End: 2019-11-06
Payer: OTHER MISCELLANEOUS

## 2019-11-06 DIAGNOSIS — M25.512 ACUTE PAIN OF LEFT SHOULDER: Primary | ICD-10-CM

## 2019-11-06 PROCEDURE — 97112 NEUROMUSCULAR REEDUCATION: CPT | Performed by: PHYSICAL THERAPIST

## 2019-11-06 PROCEDURE — 97110 THERAPEUTIC EXERCISES: CPT | Performed by: PHYSICAL THERAPIST

## 2019-11-06 NOTE — PROGRESS NOTES
Daily Note     Today's date: 2019  Patient name: Alysha Juarez  : 1965  MRN: [id number]  Referring provider: Elsa Miller DO  Dx:   Encounter Diagnosis     ICD-10-CM    1  Acute pain of left shoulder M25 512        Start Time: 0800  Stop Time: 0837  Total time in clinic (min): 37 minutes    Subjective: pt states the shoulder is not feeling that bad today  He has not done much with the arm yet today and stated that the arm probably needed a break  Objective: See treatment diary below      Assessment: Tolerated treatment well  Pt had increased pain in the region of the biceps tendon, bicipital groove, anterior deltoid  Pt had increased pain with supine shoulder flexion that was reduced with duret pressure to the region  Pt had increased crepitus with AROM shoulder flexion and abduction  Addition of supine shoulder ABCs and serratus punches was completed to promote improvements in shoulder stability to decrease symptoms  Patient would benefit from continued PT      Plan: Continue per plan of care        Precautions: None  Pt 1:1 from 8:09-8:37  Manual                                                                                 Exercise Diary  10/23 10/30 11/4 11/6         Pulleys  5' 5' 5'         AAROM 4-way x10ea 10"  x10 10"x10  10"x10         Wall slides 10"x10 10"  x10 10"x10 10"x10         Table slides   Flex/scap  10"x10 Flex/scap  10"x10         digiflex  Black  5"x10 Black  5"x15 Black 5" x15         wrist flex/ext 2# nv 5#  3x10 5#  3x10 5# 3x10         AROM When able Flex/abd  x1 ea Flex/abd  x5 ea  Flex/abd x10         IR stretch  10"x5 10'x5  10"x5         Supine ABCs    x1         Supine serratus punches    2x10                                                                                                                                             Modalities  10/23 10/30 11/4 11/6         CP  home home home

## 2019-11-11 ENCOUNTER — OFFICE VISIT (OUTPATIENT)
Dept: PHYSICAL THERAPY | Facility: MEDICAL CENTER | Age: 54
End: 2019-11-11
Payer: OTHER MISCELLANEOUS

## 2019-11-11 DIAGNOSIS — M25.512 ACUTE PAIN OF LEFT SHOULDER: Primary | ICD-10-CM

## 2019-11-11 PROCEDURE — G8990 OTHER PT/OT CURRENT STATUS: HCPCS | Performed by: PHYSICAL THERAPIST

## 2019-11-11 PROCEDURE — 97110 THERAPEUTIC EXERCISES: CPT | Performed by: PHYSICAL THERAPIST

## 2019-11-11 PROCEDURE — G8991 OTHER PT/OT GOAL STATUS: HCPCS | Performed by: PHYSICAL THERAPIST

## 2019-11-11 NOTE — PROGRESS NOTES
Daily Note     Today's date: 2019  Patient name: Karen Kilgore  : 1965  MRN: [id number]  Referring provider: Bryson Mendez DO  Dx:   Encounter Diagnosis     ICD-10-CM    1  Acute pain of left shoulder M25 512                   Subjective: Pt states he "tweaked" his shoulder when he almost dropped something out of his fridge and went to grab it the other day, and has been having increased ant shoulder soreness since  Pt states he has f/w with MD tomorrow  Objective: See treatment diary below      Assessment: Tolerated treatment well  Patient would benefit from continued PT   Pt has most pain with 0-30 degrees of flex/abd AROM  Pt cont to state he feels improved ROM with decreased pain if he applies pressure to prox bicep tendon while performing AROM  Plan: Continue per plan of care  Precautions: None    Manual              Multi-angle RS x4'                                                                  Exercise Diary  10/23 10/30 11/4 11/6 11/11        Pulleys  5' 5' 5' 5'        AAROM 4-way x10ea 10"  x10 10"x10  10"x10 10"x10        Wall slides 10"x10 10"  x10 10"x10 10"x10         Table slides   Flex/scap  10"x10 Flex/scap  10"x10         digiflex  Black  5"x10 Black  5"x15 Black 5" x15 NP        wrist flex/ext 2# nv 5#  3x10 5#  3x10 5# 3x10 NP        AROM When able Flex/abd  x1 ea Flex/abd  x5 ea  Flex/abd x10         IR stretch  10"x5 10'x5  10"x5 10"x5        Supine ABCs    x1 x1        Supine serratus punches    2x10 x20        S/l ER     5"x10        S/l abd     P!         Supine flex  degrees     x15        Prone ext     5"x15        Prone retract     5"x15        Prone abd                                                                   Modalities  10/23 10/30 11/4 11/6         CP  home home home

## 2019-11-12 ENCOUNTER — OFFICE VISIT (OUTPATIENT)
Dept: OBGYN CLINIC | Facility: CLINIC | Age: 54
End: 2019-11-12
Payer: OTHER MISCELLANEOUS

## 2019-11-12 VITALS
BODY MASS INDEX: 25.34 KG/M2 | SYSTOLIC BLOOD PRESSURE: 150 MMHG | HEIGHT: 68 IN | HEART RATE: 87 BPM | WEIGHT: 167.2 LBS | DIASTOLIC BLOOD PRESSURE: 90 MMHG

## 2019-11-12 DIAGNOSIS — M25.512 CHRONIC LEFT SHOULDER PAIN: ICD-10-CM

## 2019-11-12 DIAGNOSIS — M75.52 SUBACROMIAL BURSITIS OF LEFT SHOULDER JOINT: ICD-10-CM

## 2019-11-12 DIAGNOSIS — G89.29 CHRONIC LEFT SHOULDER PAIN: ICD-10-CM

## 2019-11-12 DIAGNOSIS — M25.512 ARTHRALGIA OF LEFT ACROMIOCLAVICULAR JOINT: Primary | ICD-10-CM

## 2019-11-12 PROCEDURE — 99213 OFFICE O/P EST LOW 20 MIN: CPT | Performed by: PHYSICAL MEDICINE & REHABILITATION

## 2019-11-12 PROCEDURE — 20610 DRAIN/INJ JOINT/BURSA W/O US: CPT | Performed by: PHYSICAL MEDICINE & REHABILITATION

## 2019-11-12 RX ORDER — LIDOCAINE HYDROCHLORIDE 10 MG/ML
5 INJECTION, SOLUTION INFILTRATION; PERINEURAL
Status: COMPLETED | OUTPATIENT
Start: 2019-11-12 | End: 2019-11-12

## 2019-11-12 RX ORDER — TRIAMCINOLONE ACETONIDE 40 MG/ML
80 INJECTION, SUSPENSION INTRA-ARTICULAR; INTRAMUSCULAR
Status: COMPLETED | OUTPATIENT
Start: 2019-11-12 | End: 2019-11-12

## 2019-11-12 RX ADMIN — TRIAMCINOLONE ACETONIDE 80 MG: 40 INJECTION, SUSPENSION INTRA-ARTICULAR; INTRAMUSCULAR at 09:20

## 2019-11-12 RX ADMIN — LIDOCAINE HYDROCHLORIDE 5 ML: 10 INJECTION, SOLUTION INFILTRATION; PERINEURAL at 09:20

## 2019-11-12 NOTE — PATIENT INSTRUCTIONS
You had a cortisone (steroid) injection  There are two medicines in this injection, a numbing medicine and a steroid  The numbing medication component usually takes effect within a few minutes and wears off after a few hours, after which your pain may return  The steroid medication typically takes effect in 3-5 days, although some people have benefits sooner, and lasts for a much longer time

## 2019-11-12 NOTE — PROGRESS NOTES
1  Arthralgia of left acromioclavicular joint     2  Chronic left shoulder pain     3  Subacromial bursitis of left shoulder joint       No orders of the defined types were placed in this encounter  Imaging Studies (I personally reviewed images in PACS and report):  Left shoulder x-rays dated 10/2/2019  These images show arthritic changes of the acromioclavicular joint  No evidence of acute osseous abnormality  Impression:  Patient is here in follow up of left subacromial bursitis and acromioclavicular joint arthropathy with date of injury 10/2/2019  He has been doing well with physical therapy but continues to have symptoms and signs of subacromial impingement  We decided to proceed with a steroid injection of the subacromial region  The patient reported immediate relief of his symptoms afterwards although he still had some  I will see him back in about 4-5 weeks to see how things are going  No follow-ups on file  HPI:  Beba Cabrera is a 62 y o  male  who presents in follow up  Here for No chief complaint on file  Date of injury:  10/2/2019  Trajectory of symptoms:  Slightly improved since his last visit, he has had about 4 sessions of physical therapy  Review of Systems   Constitutional: Positive for activity change  Negative for fever  HENT: Negative for sore throat  Eyes: Negative for visual disturbance  Respiratory: Negative for shortness of breath  Cardiovascular: Negative for chest pain  Gastrointestinal: Negative for abdominal pain  Endocrine: Negative for polydipsia  Genitourinary: Negative for difficulty urinating  Musculoskeletal: Positive for arthralgias  Skin: Negative for rash  Allergic/Immunologic: Negative for immunocompromised state  Neurological: Negative for weakness and numbness  Hematological: Does not bruise/bleed easily  Psychiatric/Behavioral: Negative for confusion  Following history reviewed and updated:  History reviewed  No pertinent past medical history  Past Surgical History:   Procedure Laterality Date    COLONOSCOPY      Fiberoptic     Social History   Social History     Substance and Sexual Activity   Alcohol Use Yes    Comment: Social     Social History     Substance and Sexual Activity   Drug Use Yes    Types: Marijuana     Social History     Tobacco Use   Smoking Status Former Smoker   Smokeless Tobacco Never Used     Family History   Problem Relation Age of Onset    Lung cancer Mother     Arthritis Father     Heart disease Family         Cardiac Disorder     No Known Allergies     Constitutional:  /90 (BP Location: Right arm, Patient Position: Sitting, Cuff Size: Standard)   Pulse 87   Ht 5' 8" (1 727 m)   Wt 75 8 kg (167 lb 3 2 oz)   BMI 25 42 kg/m²    General: NAD  Eyes: Clear sclerae  ENT: No inflammation, lesion, or mass of lips  No tracheal deviation  Musculoskeletal: As mentioned below  Integumentary: No visible rashes or skin lesions  Pulmonary/Chest: Effort normal  No respiratory distress  Neuro: CN's grossly intact, ROCK  Psych: Normal affect and judgement  Vascular: WWP  Left Shoulder Exam     Tenderness   The patient is experiencing tenderness in the acromion      Range of Motion   Active abduction: normal   Passive abduction: normal     Muscle Strength   Abduction: 4/5   Internal rotation: 4/5   External rotation: 4/5     Tests   Levy test: positive  Cross arm: positive  Impingement: positive  Sulcus: absent    Other   Erythema: absent  Scars: absent  Sensation: normal  Pulse: present              Large joint arthrocentesis: L subacromial bursa  Date/Time: 11/12/2019 9:20 AM  Consent given by: patient  Supporting Documentation  Indications: pain   Procedure Details  Location: shoulder - L subacromial bursa  Ultrasound guidance: no  Approach: posterolateral  Medications administered: 5 mL lidocaine 1 %; 80 mg triamcinolone acetonide 40 mg/mL    Patient tolerance: patient tolerated the procedure well with no immediate complications  Dressing:  Sterile dressing applied    Discussed risk of infection, bleeding, fat atrophy/pigmentation, pain/steroid flare and increased blood sugars  Patient aware and expressed understanding

## 2019-11-13 ENCOUNTER — OFFICE VISIT (OUTPATIENT)
Dept: PHYSICAL THERAPY | Facility: MEDICAL CENTER | Age: 54
End: 2019-11-13
Payer: OTHER MISCELLANEOUS

## 2019-11-13 DIAGNOSIS — M25.512 ACUTE PAIN OF LEFT SHOULDER: Primary | ICD-10-CM

## 2019-11-13 PROCEDURE — 97110 THERAPEUTIC EXERCISES: CPT | Performed by: PHYSICAL THERAPIST

## 2019-11-13 PROCEDURE — 97140 MANUAL THERAPY 1/> REGIONS: CPT | Performed by: PHYSICAL THERAPIST

## 2019-11-13 NOTE — PROGRESS NOTES
Daily Note     Today's date: 2019  Patient name: Kee Ventura  : 1965  MRN: [id number]  Referring provider: Nakul Smith DO  Dx:   Encounter Diagnosis     ICD-10-CM    1  Acute pain of left shoulder M25 512                   Subjective: Pt states he saw MD yesterday, and received cortisone injection  Pt states today was also his first day back to work in 3 weeks, and did have to carry ladders using mostly R UE  Objective: See treatment diary below      Assessment: Tolerated treatment well  Patient would benefit from continued PT   Due to injection yesterday, held all strengthening and performed PROM  Resume TE nv as able  Plan: Continue per plan of care  Precautions: None    Manual             Multi-angle RS x4'            PROM L shoulder  x10'                                                    Exercise Diary  10/23 10/30 11/4 11/6 11/11 11/13       Pulleys  5' 5' 5' 5' 5"       AAROM 4-way x10ea 10"  x10 10"x10  10"x10 10"x10 10"x10ea       Wall slides 10"x10 10"  x10 10"x10 10"x10  10"x10       Table slides   Flex/scap  10"x10 Flex/scap  10"x10 10"x10ea 10"x10ea       digiflex  Black  5"x10 Black  5"x15 Black 5" x15 NP        wrist flex/ext 2# nv 5#  3x10 5#  3x10 5# 3x10 NP        AROM When able Flex/abd  x1 ea Flex/abd  x5 ea  Flex/abd x10         IR stretch  10"x5 10'x5  10"x5 10"x5 10"x5       Supine ABCs    x1 x1        Supine serratus punches    2x10 x20        S/l ER     5"x10        S/l abd     P!         Supine flex  degrees     x15        Prone ext     5"x15        Prone retract     5"x15        Prone abd                                                                   Modalities  10/23 10/30 11/4 11/6         CP  home home home

## 2019-11-18 ENCOUNTER — OFFICE VISIT (OUTPATIENT)
Dept: PHYSICAL THERAPY | Facility: MEDICAL CENTER | Age: 54
End: 2019-11-18
Payer: OTHER MISCELLANEOUS

## 2019-11-18 DIAGNOSIS — M25.512 ACUTE PAIN OF LEFT SHOULDER: Primary | ICD-10-CM

## 2019-11-18 PROCEDURE — 97140 MANUAL THERAPY 1/> REGIONS: CPT | Performed by: PHYSICAL THERAPIST

## 2019-11-18 PROCEDURE — 97110 THERAPEUTIC EXERCISES: CPT | Performed by: PHYSICAL THERAPIST

## 2019-11-18 NOTE — PROGRESS NOTES
Daily Note     Today's date: 2019  Patient name: Roby Escalante  : 1965  MRN: [id number]  Referring provider: Adrián Rivera DO  Dx:   Encounter Diagnosis     ICD-10-CM    1  Acute pain of left shoulder M25 512                   Subjective: Pt states he crossed his arms yesterday and felt severe pain in l ant shoulder  Pt denies hearing any audible noise  Pt states he iced it 5 hrs later, but doesn't notice any swelling or bruising  Pt states he doesn't have f/u with MD until 2nd week of dec  Will cont PT until f/u, and perform RA prior  Objective: See treatment diary below      Assessment: Tolerated treatment well  Patient would benefit from continued PT   Initiated gentle isomerics for cuff strengthening today and advised to cont for HEP  Plan: Continue per plan of care  Precautions: None    Manual            Multi-angle RS x4'            PROM L shoulder  x10' x10'                                                   Exercise Diary  10/23 10/30 11/4 11/6 11/11 11/13 11/18      Pulleys  5' 5' 5' 5' 5" x5'      AAROM 4-way x10ea 10"  x10 10"x10  10"x10 10"x10 10"x10ea 10"x10      Wall slides 10"x10 10"  x10 10"x10 10"x10  10"x10 10"x10      Table slides   Flex/scap  10"x10 Flex/scap  10"x10 10"x10ea 10"x10ea NP      digiflex  Black  5"x10 Black  5"x15 Black 5" x15 NP        wrist flex/ext 2# nv 5#  3x10 5#  3x10 5# 3x10 NP        AROM When able Flex/abd  x1 ea Flex/abd  x5 ea  Flex/abd x10         IR stretch  10"x5 10'x5  10"x5 10"x5 10"x5 10"x4      Supine ABCs    x1 x1        Supine serratus punches    2x10 x20        S/l ER     5"x10        S/l abd     P!         Supine flex  degrees     x15        Prone ext     5"x15        Prone retract     5"x15        Prone abd             4-way isometrics       5"x10ea                                               Modalities  10/23 10/30 11/4 11/6         CP  home home home

## 2019-11-20 ENCOUNTER — OFFICE VISIT (OUTPATIENT)
Dept: PHYSICAL THERAPY | Facility: MEDICAL CENTER | Age: 54
End: 2019-11-20
Payer: OTHER MISCELLANEOUS

## 2019-11-20 DIAGNOSIS — M25.512 ACUTE PAIN OF LEFT SHOULDER: Primary | ICD-10-CM

## 2019-11-20 PROCEDURE — 97140 MANUAL THERAPY 1/> REGIONS: CPT | Performed by: PHYSICAL THERAPIST

## 2019-11-20 PROCEDURE — 97110 THERAPEUTIC EXERCISES: CPT | Performed by: PHYSICAL THERAPIST

## 2019-11-20 NOTE — PROGRESS NOTES
Daily Note     Today's date: 2019  Patient name: Karen Kilgore  : 1965  MRN: [id number]  Referring provider: Bryson Mendez DO  Dx:   Encounter Diagnosis     ICD-10-CM    1  Acute pain of left shoulder M25 512                   Subjective: Pt states at times he feels as "tight band" sensation around L upper arm that feels like a "tightening"  Pt states it's a weird feeling, and notices it when reaching over head but can occur at rest         Objective: See treatment diary below      Assessment: Tolerated treatment well  Patient would benefit from continued PT   Pt states overall reduction in pain with all AROM/PROM activities  Plan: Continue per plan of care  Precautions: None    Manual           Multi-angle RS x4'            PROM L shoulder  x10' x10' x10'                                                  Exercise Diary  10/23 10/30 11/4 11/6 11/11 11/13 11/18 11/20     Pulleys  5' 5' 5' 5' 5" x5' x5'     AAROM 4-way x10ea 10"  x10 10"x10  10"x10 10"x10 10"x10ea 10"x10 10"x10ea     Wall slides 10"x10 10"  x10 10"x10 10"x10  10"x10 10"x10 10"x10     Table slides   Flex/scap  10"x10 Flex/scap  10"x10 10"x10ea 10"x10ea NP      digiflex  Black  5"x10 Black  5"x15 Black 5" x15 NP        wrist flex/ext 2# nv 5#  3x10 5#  3x10 5# 3x10 NP        AROM When able Flex/abd  x1 ea Flex/abd  x5 ea  Flex/abd x10         IR stretch  10"x5 10'x5  10"x5 10"x5 10"x5 10"x4 15"x4     Supine ABCs    x1 x1        Supine serratus punches    2x10 x20        S/l ER     5"x10        S/l abd     P!         Supine flex  degrees     x15        Prone ext     5"x15   5"x20     Prone retract     5"x15   5"x20     Prone abd             4-way isometrics       5"x10ea 5"x10ea     UBE        nv     TB ext/retract        nv     TB IR/ER        nv       Modalities  10/23 10/30 11/4 11/6         CP  home home home

## 2019-11-25 ENCOUNTER — OFFICE VISIT (OUTPATIENT)
Dept: PHYSICAL THERAPY | Facility: MEDICAL CENTER | Age: 54
End: 2019-11-25
Payer: OTHER MISCELLANEOUS

## 2019-11-25 DIAGNOSIS — M25.512 ACUTE PAIN OF LEFT SHOULDER: Primary | ICD-10-CM

## 2019-11-25 PROCEDURE — 97110 THERAPEUTIC EXERCISES: CPT

## 2019-11-25 PROCEDURE — 97140 MANUAL THERAPY 1/> REGIONS: CPT

## 2019-11-25 NOTE — PROGRESS NOTES
Daily Note     Today's date: 2019  Patient name: Kee Ventura  : 1965  MRN: [id number]  Referring provider: Nakul Smith DO  Dx:   Encounter Diagnosis     ICD-10-CM    1  Acute pain of left shoulder M25 512                   Subjective: Pt reports no discomfort working today  1:1 35 min        Objective: See treatment diary below  Precautions: None    Manual          Multi-angle RS x4'            PROM L shoulder  x10' x10' x10' HS x10'                                                 Exercise Diary  10/23 10/30 11/4 11/6 11/11 11/13 11/18 11/20 11/25    Pulleys  5' 5' 5' 5' 5" x5' x5' NP    AAROM 4-way x10ea 10"  x10 10"x10  10"x10 10"x10 10"x10ea 10"x10 10"x10ea 10"x10    Wall slides 10"x10 10"  x10 10"x10 10"x10  10"x10 10"x10 10"x10 10"x10    Table slides   Flex/scap  10"x10 Flex/scap  10"x10 10"x10ea 10"x10ea NP      digiflex  Black  5"x10 Black  5"x15 Black 5" x15 NP        wrist flex/ext 2# nv 5#  3x10 5#  3x10 5# 3x10 NP        AROM When able Flex/abd  x1 ea Flex/abd  x5 ea  Flex/abd x10         IR stretch  10"x5 10'x5  10"x5 10"x5 10"x5 10"x4 15"x4 15" x4    Supine ABCs    x1 x1        Supine serratus punches    2x10 x20        S/l ER     5"x10        S/l abd     P! Supine flex  degrees     x15        Prone ext     5"x15   5"x20  5" x20   Prone retract     5"x15   5"x20  5"x20   Prone abd             4-way isometrics       5"x10ea 5"x10ea     UBE        nv 5' GTB 2x10   TB ext/retract        nv  GTB 2x10   TB IR/ER        nv  GTB 2x10     Modalities  10/23 10/30 11/4 11/6         CP  home home home                                            Assessment: Pt did well with all TE as listed, reports no increased pain  Fatigued with prone Cindy    Plan: Continue per plan of care

## 2019-11-27 ENCOUNTER — OFFICE VISIT (OUTPATIENT)
Dept: PHYSICAL THERAPY | Facility: MEDICAL CENTER | Age: 54
End: 2019-11-27
Payer: OTHER MISCELLANEOUS

## 2019-11-27 DIAGNOSIS — M25.512 ACUTE PAIN OF LEFT SHOULDER: Primary | ICD-10-CM

## 2019-11-27 PROCEDURE — 97110 THERAPEUTIC EXERCISES: CPT | Performed by: PHYSICAL THERAPIST

## 2019-11-27 PROCEDURE — 97112 NEUROMUSCULAR REEDUCATION: CPT | Performed by: PHYSICAL THERAPIST

## 2019-11-27 PROCEDURE — 97140 MANUAL THERAPY 1/> REGIONS: CPT | Performed by: PHYSICAL THERAPIST

## 2019-11-27 NOTE — PROGRESS NOTES
Daily Note     Today's date: 2019  Patient name: Janey Read  : 1965  MRN: [id number]  Referring provider: Sam Diez DO  Dx:   Encounter Diagnosis     ICD-10-CM    1  Acute pain of left shoulder M25 512        Start Time: 1030  Stop Time: 1105  Total time in clinic (min): 35 minutes    Subjective: pt states that the shoulder pain is pretty much gone  His biggest complaint is that he cannot find a comfortably position to sleep in  Objective: See treatment diary below      Assessment: Tolerated treatment well  Pt completed all exercises with no complaints of pain  Increase in resistance was tolerated well with improvements in complaints of symptoms that are usually felt with supine cane flexion  Continue to monitor and progress as tolerated  Patient would benefit from continued PT      Plan: Continue per plan of care  Precautions: None  Pt 1:1 from 10:33-11:05  Manual         Multi-angle RS x4'            PROM L shoulder  x10' x10' x10' HS x10' 8'                                                Exercise Diary  11/27 10/30 11/4 11/6 11/11 11/13 11/18 11/20 11/25    Pulleys  5' 5' 5' 5' 5" x5' x5' NP    AAROM 4-way  10"  x10 10"x10  10"x10 10"x10 10"x10ea 10"x10 10"x10ea 10"x10    Wall slides 10"x10 10"  x10 10"x10 10"x10  10"x10 10"x10 10"x10 10"x10    Table slides -  Flex/scap  10"x10 Flex/scap  10"x10 10"x10ea 10"x10ea NP      digiflex - Black  5"x10 Black  5"x15 Black 5" x15 NP        wrist flex/ext - 5#  3x10 5#  3x10 5# 3x10 NP        AROM - Flex/abd  x1 ea Flex/abd  x5 ea  Flex/abd x10         IR stretch - 10"x5 10'x5  10"x5 10"x5 10"x5 10"x4 15"x4 15" x4    Supine ABCs -   x1 x1        Supine serratus punches -   2x10 x20        S/l ER 5"2x10 #1    5"x10        S/l abd     P!         Supine flex  degrees Cane #2 2x10    x15        Prone ext 5"x20 #1    5"x15   5"x20  5" x20   Prone retract 5"x20 #1    5"x15   5"x20  5"x20   Prone abd 5"x20 #1            4-way isometrics       5"x10ea 5"x10ea     UBE 5'       nv 5' GTB 2x10   TB ext/retract gtb 2x10       nv  GTB 2x10   TB IR/ER gtb 2x10       nv  GTB 2x10     Modalities  10/23 10/30 11/4 11/6         CP  home home home

## 2019-12-02 ENCOUNTER — APPOINTMENT (OUTPATIENT)
Dept: PHYSICAL THERAPY | Facility: MEDICAL CENTER | Age: 54
End: 2019-12-02
Payer: OTHER MISCELLANEOUS

## 2019-12-04 ENCOUNTER — EVALUATION (OUTPATIENT)
Dept: PHYSICAL THERAPY | Facility: MEDICAL CENTER | Age: 54
End: 2019-12-04
Payer: OTHER MISCELLANEOUS

## 2019-12-04 DIAGNOSIS — M25.512 ACUTE PAIN OF LEFT SHOULDER: Primary | ICD-10-CM

## 2019-12-04 PROCEDURE — G8991 OTHER PT/OT GOAL STATUS: HCPCS | Performed by: PHYSICAL THERAPIST

## 2019-12-04 PROCEDURE — 97110 THERAPEUTIC EXERCISES: CPT | Performed by: PHYSICAL THERAPIST

## 2019-12-04 PROCEDURE — 97140 MANUAL THERAPY 1/> REGIONS: CPT | Performed by: PHYSICAL THERAPIST

## 2019-12-04 PROCEDURE — 97112 NEUROMUSCULAR REEDUCATION: CPT | Performed by: PHYSICAL THERAPIST

## 2019-12-04 PROCEDURE — G8990 OTHER PT/OT CURRENT STATUS: HCPCS | Performed by: PHYSICAL THERAPIST

## 2019-12-04 NOTE — PROGRESS NOTES
PT Re-Evaluation     Today's date: 2019  Patient name: Chely Giron  : 1965  MRN: [id number]  Referring provider: Ankita Bauman DO  Dx:   Encounter Diagnosis     ICD-10-CM    1  Acute pain of left shoulder M25 [id number]                   Assessment  Assessment details: Pt has attended 11 visits of skilled PT over the past 6 weeks  Pt is making steady progress with decreased pain, and improved ROM  Livier Console Pt states if he lifts close to his body it feels Okay, however tried bench pressing yesterday and was very painful and weak  Upon reassessment, pt demonstrates decreased pain and tissue tenderness, and improved L shoulder strength and ROM, and (+) impingement tests and (+) bicep testing persists  Suspect possible prox bicep tear, however pain sx's and overall function is improving  Pt in agreement with placing PT on hold as he has returned to gym and is working full-time, and is aware of what his limitations are with lifting  Pt has f/u with MD in 2 weeksThank you for your referral   Impairments: activity intolerance, impaired physical strength and pain with function  Understanding of Dx/Px/POC: good   Prognosis: good    Goals  ST: Decrease pain by 25% in 4 weeks with all functional activities  2: Improved ROM by 25% in 4 weeks to assist with all functional activities  3: Improve strength by 1/2 grade in 4 weeks to assist with all functional activities  LT:  Decrease pain to 0-1/10 with all functional activities in 4-6 weeks-met as of   2: Improved ROM to WFL's in 4-6 weeks to assist with all functional activities-met as of   3:  Increase strength to WFL's in 4-6 weeks to assist with all functional activities-partially met as of     Plan  Patient would benefit from: skilled physical therapy  Planned modality interventions: thermotherapy: hydrocollator packs and cryotherapy  Planned therapy interventions: manual therapy, joint mobilization, neuromuscular re-education, postural training, patient education, therapeutic exercise, therapeutic activities, stretching, strengthening and home exercise program  Frequency: 2x week  Duration in visits: 12  Duration in weeks: 8  Plan of Care beginning date: 2019  Plan of Care expiration date: 2020  Treatment plan discussed with: patient        Subjective Evaluation    History of Present Illness  Mechanism of injury: trauma  Mechanism of injury: Pt fell on L shoulder at work on 10/2/19  Pain  Current pain ratin  At best pain ratin  At worst pain ratin  Quality: sharp  Relieving factors: ice  Aggravating factors: overhead activity    Hand dominance: right      Diagnostic Tests  X-ray: abnormal  Treatments  Current treatment: injection treatment and physical therapy  Patient Goals  Patient goals for therapy: decreased pain, increased strength and return to work          Objective     Observations     Additional Observation Details  Posture/observation: generally conditioned 61 yo male, mild forward head posture and scap protraction in sitting, (+) L prox bicep atrophy    Palpation     Additional Palpation Details  Palpation:  Pain/tenderness L ant shoulder/RTC insertion site and long head of bicep-persists as of     Neurological Testing     Sensation     Shoulder   Left Shoulder   Intact: light touch    Right Shoulder   Intact: light touch    Additional Neurological Details  Pt reports radicular pain into L UE, mika L bicep, however states pain is less    Active Range of Motion   Left Shoulder   Flexion: 164 degrees   Extension: 60 degrees   Abduction: 175 degrees   External rotation BTH: T1   Internal rotation BTB: T10     Additional Active Range of Motion Details  Pt denies pain with IR/ER, and actually states those positions feel make pain better    Strength/Myotome Testing     Left Shoulder     Planes of Motion   Flexion: 4-   Extension: 5   Abduction: 4   Adduction: 4   External rotation at 0°: 4   Internal rotation at 0°: 4     Isolated Muscles   Biceps: 3+   Triceps: 4     Right Shoulder   Normal muscle strength    Isolated Muscles   Biceps: 4     Additional Strength Details  Supination: 4-/5 AND p! Tests     Left Shoulder   Positive Hawkin's, Neer's and Speed's  Negative empty can         Flowsheet Rows      Most Recent Value   PT/OT G-Codes   Current Score  59   Projected Score  65   FOTO information reviewed  Yes   Assessment Type  Re-evaluation   G code set  Other PT/OT Primary   Other PT Primary Current Status ()  CK   Other PT Primary Goal Status ()  CJ             Precautions: None    Manual  11/11 11/13 11/18 11/20 11/25 11/27           Multi-angle RS x4'                     PROM L shoulder   x10' x10' x10' HS x10' 8'                                                                                      Exercise Diary  11/27 12/4 11/4 11/6 11/11 11/13 11/18 11/20 11/25     Pulleys   5' 5' 5' 5' 5" x5' x5' NP     AAROM 4-way   HEP 10"x10  10"x10 10"x10 10"x10ea 10"x10 10"x10ea 10"x10     Wall slides 10"x10 10"  x10 10"x10 10"x10   10"x10 10"x10 10"x10 10"x10     Table slides -   Flex/scap  10"x10 Flex/scap  10"x10 10"x10ea 10"x10ea NP         digiflex -  Black  5"x15 Black 5" x15 NP             wrist flex/ext -  5#  3x10 5# 3x10 NP             AROM - Flex/abd  x1 ea Flex/abd  x5 ea  Flex/abd x10               IR stretch -  10'x5  10"x5 10"x5 10"x5 10"x4 15"x4 15" x4     Supine ABCs -     x1 x1             Supine serratus punches -     2x10 x20             S/l ER 5"2x10 #1       5"x10             S/l abd         P!             Supine flex  degrees Cane #2 2x10       x15             Prone ext 5"x20 #1       5"x15     5"x20   5" x20   Prone retract 5"x20 #1       5"x15     5"x20   5"x20   Prone abd 5"x20 #1                     4-way isometrics             5"x10ea 5"x10ea       UBE 5'  x5'           nv 5' GTB 2x10   TB ext/retract gtb 2x10 GTB 5"x20           nv   GTB 2x10   TB IR/ER gtb 2x10 GTB 5"x20           nv   GTB 2x10      Modalities  10/23 10/30 11/4 11/6               CP   home home home

## 2019-12-20 ENCOUNTER — OFFICE VISIT (OUTPATIENT)
Dept: OBGYN CLINIC | Facility: CLINIC | Age: 54
End: 2019-12-20
Payer: OTHER MISCELLANEOUS

## 2019-12-20 VITALS
DIASTOLIC BLOOD PRESSURE: 79 MMHG | HEIGHT: 68 IN | BODY MASS INDEX: 25.31 KG/M2 | SYSTOLIC BLOOD PRESSURE: 134 MMHG | WEIGHT: 167 LBS | HEART RATE: 79 BPM

## 2019-12-20 DIAGNOSIS — M25.512 CHRONIC LEFT SHOULDER PAIN: ICD-10-CM

## 2019-12-20 DIAGNOSIS — M25.512 ARTHRALGIA OF LEFT ACROMIOCLAVICULAR JOINT: ICD-10-CM

## 2019-12-20 DIAGNOSIS — G89.29 CHRONIC LEFT SHOULDER PAIN: ICD-10-CM

## 2019-12-20 DIAGNOSIS — M75.52 SUBACROMIAL BURSITIS OF LEFT SHOULDER JOINT: Primary | ICD-10-CM

## 2019-12-20 PROCEDURE — 99213 OFFICE O/P EST LOW 20 MIN: CPT | Performed by: PHYSICAL MEDICINE & REHABILITATION

## 2019-12-20 NOTE — PROGRESS NOTES
1  Subacromial bursitis of left shoulder joint  diclofenac sodium (VOLTAREN) 1 %   2  Chronic left shoulder pain  diclofenac sodium (VOLTAREN) 1 %   3  Arthralgia of left acromioclavicular joint  diclofenac sodium (VOLTAREN) 1 %     No orders of the defined types were placed in this encounter  Imaging Studies (I personally reviewed images in PACS and report):  Left shoulder x-rays dated 10/2/2019  Mike Parsonso images show arthritic changes of the acromioclavicular joint   No evidence of acute osseous abnormality  Impression:  Patient is here in follow up of left subacromial bursitis and acromioclavicular joint arthropathy with date of injury 10/2/2019  He has done well after going through physical therapy  He is now doing exercises on his own and going to the gym and doing a lot of weight lifting  He does still get some twinges of pain depending on what he is doing but he has full range of motion  He will get back into his normal routine and work  I also prescribed him Voltaren gel that he will use as needed  I will see him back if needed  Return if symptoms worsen or fail to improve  HPI:  Mary Lou Zafar is a 62 y o  male  who presents in follow up  Here for   Chief Complaint   Patient presents with    Follow-up     Date of injury:  10/2/2019  Trajectory of symptoms:  Drastically improved-see above  Review of Systems   Constitutional: Positive for activity change  Negative for fever  HENT: Negative for sore throat  Eyes: Negative for visual disturbance  Respiratory: Negative for shortness of breath  Cardiovascular: Negative for chest pain  Gastrointestinal: Negative for abdominal pain  Endocrine: Negative for polydipsia  Genitourinary: Negative for difficulty urinating  Musculoskeletal: Positive for arthralgias  Skin: Negative for rash  Allergic/Immunologic: Negative for immunocompromised state  Neurological: Negative for numbness     Hematological: Does not bruise/bleed easily  Psychiatric/Behavioral: Negative for confusion  Following history reviewed and updated:  History reviewed  No pertinent past medical history  Past Surgical History:   Procedure Laterality Date    COLONOSCOPY      Fiberoptic     Social History   Social History     Substance and Sexual Activity   Alcohol Use Yes    Comment: Social     Social History     Substance and Sexual Activity   Drug Use Yes    Types: Marijuana     Social History     Tobacco Use   Smoking Status Former Smoker   Smokeless Tobacco Never Used     Family History   Problem Relation Age of Onset    Lung cancer Mother     Arthritis Father     Heart disease Family         Cardiac Disorder     No Known Allergies     Constitutional:  /79 (BP Location: Right arm, Patient Position: Sitting, Cuff Size: Standard)   Pulse 79   Ht 5' 8" (1 727 m)   Wt 75 8 kg (167 lb)   BMI 25 39 kg/m²    General: NAD  Eyes: Clear sclerae  ENT: No inflammation, lesion, or mass of lips  No tracheal deviation  Musculoskeletal: As mentioned below  Integumentary: No visible rashes or skin lesions  Pulmonary/Chest: Effort normal  No respiratory distress  Neuro: CN's grossly intact, ROCK  Psych: Normal affect and judgement  Vascular: WWP  Left Shoulder Exam     Tenderness   The patient is experiencing tenderness in the biceps tendon  Range of Motion   The patient has normal left shoulder ROM  Tests   Levy test: positive  Impingement: positive    Other   Erythema: absent  Scars: absent  Sensation: normal  Pulse: present     Comments:  Positive Speed's test and Yergason's test              Procedures - none for this visit

## 2020-04-09 ENCOUNTER — TELEMEDICINE (OUTPATIENT)
Dept: FAMILY MEDICINE CLINIC | Facility: MEDICAL CENTER | Age: 55
End: 2020-04-09
Payer: COMMERCIAL

## 2020-04-09 VITALS
WEIGHT: 157.6 LBS | SYSTOLIC BLOOD PRESSURE: 137 MMHG | BODY MASS INDEX: 23.89 KG/M2 | TEMPERATURE: 97.6 F | DIASTOLIC BLOOD PRESSURE: 87 MMHG | HEIGHT: 68 IN

## 2020-04-09 DIAGNOSIS — M72.2 PLANTAR FASCIITIS: Primary | ICD-10-CM

## 2020-04-09 DIAGNOSIS — M75.52 SUBACROMIAL BURSITIS OF LEFT SHOULDER JOINT: ICD-10-CM

## 2020-04-09 DIAGNOSIS — R03.0 ELEVATED BLOOD PRESSURE READING: ICD-10-CM

## 2020-04-09 PROCEDURE — 99213 OFFICE O/P EST LOW 20 MIN: CPT | Performed by: FAMILY MEDICINE

## 2021-03-04 ENCOUNTER — TELEPHONE (OUTPATIENT)
Dept: OBGYN CLINIC | Facility: HOSPITAL | Age: 56
End: 2021-03-04

## 2021-03-17 ENCOUNTER — TELEPHONE (OUTPATIENT)
Dept: FAMILY MEDICINE CLINIC | Facility: MEDICAL CENTER | Age: 56
End: 2021-03-17

## 2021-03-17 NOTE — TELEPHONE ENCOUNTER
Patient will try to upload a copy of the results to [facility], went over at home care instructions and the criteria for leaving quarantine

## 2021-03-17 NOTE — TELEPHONE ENCOUNTER
Pt's wife called to say that the pt got Covid tested at [address] [place] [address] and the results are positive  There are going to text the results to the pt but they have not received it yet  Please, advise pt

## 2021-03-23 ENCOUNTER — TELEPHONE (OUTPATIENT)
Dept: FAMILY MEDICINE CLINIC | Facility: MEDICAL CENTER | Age: 56
End: 2021-03-23

## 2021-05-17 ENCOUNTER — APPOINTMENT (OUTPATIENT)
Dept: RADIOLOGY | Facility: OTHER | Age: 56
End: 2021-05-17
Payer: OTHER MISCELLANEOUS

## 2021-05-17 ENCOUNTER — OFFICE VISIT (OUTPATIENT)
Dept: OBGYN CLINIC | Facility: OTHER | Age: 56
End: 2021-05-17
Payer: OTHER MISCELLANEOUS

## 2021-05-17 VITALS
HEART RATE: 83 BPM | SYSTOLIC BLOOD PRESSURE: 166 MMHG | HEIGHT: 68 IN | BODY MASS INDEX: 24.71 KG/M2 | DIASTOLIC BLOOD PRESSURE: 108 MMHG | WEIGHT: 163 LBS

## 2021-05-17 DIAGNOSIS — S46.102A INJURY OF TENDON OF LONG HEAD OF BICEPS, LEFT, INITIAL ENCOUNTER: ICD-10-CM

## 2021-05-17 DIAGNOSIS — G89.29 CHRONIC LEFT SHOULDER PAIN: Primary | ICD-10-CM

## 2021-05-17 DIAGNOSIS — M25.512 LEFT SHOULDER PAIN, UNSPECIFIED CHRONICITY: ICD-10-CM

## 2021-05-17 DIAGNOSIS — M25.512 CHRONIC LEFT SHOULDER PAIN: Primary | ICD-10-CM

## 2021-05-17 DIAGNOSIS — M75.102 TEAR OF LEFT SUPRASPINATUS TENDON: ICD-10-CM

## 2021-05-17 PROCEDURE — 73030 X-RAY EXAM OF SHOULDER: CPT

## 2021-05-17 PROCEDURE — 99214 OFFICE O/P EST MOD 30 MIN: CPT | Performed by: ORTHOPAEDIC SURGERY

## 2021-05-17 RX ORDER — CHLORHEXIDINE GLUCONATE 0.12 MG/ML
15 RINSE ORAL ONCE
Status: CANCELLED | OUTPATIENT
Start: 2021-05-17 | End: 2021-05-17

## 2021-05-17 RX ORDER — AMOXICILLIN 500 MG/1
500 CAPSULE ORAL 2 TIMES DAILY
COMMUNITY
Start: 2021-03-06 | End: 2021-06-03

## 2021-05-17 NOTE — PROGRESS NOTES
Assessment  Diagnoses and all orders for this visit:    Chronic left shoulder pain    Tear of left supraspinatus tendon    Injury of tendon of long head of biceps, left, initial encounter        Discussion and Plan:      The patient has a large retracted supraspinatus tear and long-head biceps tendon subluxation which has findings consistent with an injury in 2019 as described by the patient  Unfortunately there is significant retraction at this point and there is some mild muscle atrophy, however the atrophy is not associated with proximal humeral migration on the plain x-ray today and given his young age, high activity level and lack of significant atrophy an attempted arthroscopic repair is indicated, if repair is not possible and no significant glenohumeral osteoarthritis seen at time of surgery then a superior capsular reconstruction would be performed at the same time  If these interventions fail then reverse total shoulder arthroplasty would be the next surgical option but that is something we would not be offering him without the arthroscopic treatment 1st   He is well aware of the complexities of healing following such a large repair and the possibility of an SCR being required as well as the functional limitations of that procedure and does wish to proceed forward with scheduling for arthroscopic rotator cuff repair of left shoulder with possible superior capsular reconstruction (SCR)  Patient will continue with light duty restriction given this work-related injury and we will help guide him and postoperative limitations as he recovers from the procedure  A thorough discussion was performed with the patient regarding the risks and benefit of operative and nonoperative treatment of their rotator cuff tear    Risks discussed include but were not limited to infection, neurovascular injury, recurrent tear, nonhealing of the repair, need for further surgery, need for biceps tenodesis or tenotomy, stiffness, need for prolonged rehabilitation, as well as the risk of anesthesia  After this discussion all questions were answered and informed consent was obtained in the office for arthroscopic rotator cuff repair of the left shoulder possible superior capsular reconstruction  The patient will be scheduled for this procedure accordingly  Subjective:   Patient ID: Kaleigh Rodriguez is a 60 y o  male      HPI  The patient presents with a chief complaint of left shoulder pain  The pain began 2 year(s) ago and is associated with an acute injury  Patient reports a trip and fall while at work 10/2019  The patient describes the pain as aching, dull and sharp in intensity,  intermittent in timing, and localizes the pain to the  right subacromial joint, biceps tendon  The pain is worse with movement, overhead work, overuse and raising arm over head and relieved by rest   The pain is not associated with numbness and tingling  The pain is not associated with constitutional symptoms  The patient is awoken at night by the pain  The patient was initially treated by Dr Kathy Faria with CS injection and PT which improved his symptoms and patient was released back to work  He return to the gym but felt his strength never fully improved  He had increased pain in 2020 so he contacted his WC ins and patient was seen by Dr Carolyn Morales at [facility]  He reports Ama was going to refer him to one of his partners however a few days later he received a call that that physical did not have anything to offer  Patient was then referred to Dr Eliana Eric who discussed surgery however he again received a telephone call that Dr Eliana Eric did not want to perform surgery               The following portions of the patient's history were reviewed and updated as appropriate: allergies, current medications, past family history, past medical history, past social history, past surgical history and problem list     Review of Systems Constitutional: Negative for chills and fever  HENT: Negative for drooling and hearing loss  Eyes: Negative for visual disturbance  Respiratory: Negative for cough and shortness of breath  Cardiovascular: Negative for chest pain  Gastrointestinal: Negative for abdominal pain  Skin: Negative for rash  Psychiatric/Behavioral: Negative for agitation  Objective:  BP (!) 166/108   Pulse 83   Ht 5' 8" (1 727 m)   Wt 73 9 kg (163 lb)   BMI 24 78 kg/m²       Left Shoulder Exam     Tenderness   The patient is experiencing tenderness in the biceps tendon (subacromial bursa)  Range of Motion   External rotation: 70   Forward flexion: 170   Internal rotation 0 degrees: Lumbar     Muscle Strength   Abduction: 3/5   External rotation: 4/5     Tests   Levy test: positive  Impingement: positive    Other   Erythema: absent  Sensation: normal  Pulse: present             Physical Exam  Vitals signs and nursing note reviewed  Constitutional:       Appearance: He is well-developed  HENT:      Head: Normocephalic and atraumatic  Eyes:      Pupils: Pupils are equal, round, and reactive to light  Neck:      Musculoskeletal: Normal range of motion and neck supple  Cardiovascular:      Rate and Rhythm: Normal rate and regular rhythm  Heart sounds: Normal heart sounds  Pulmonary:      Effort: Pulmonary effort is normal  No respiratory distress  Breath sounds: Normal breath sounds  Abdominal:      General: There is no distension  Palpations: Abdomen is soft  Skin:     General: Skin is warm and dry  Neurological:      Mental Status: He is alert and oriented to person, place, and time  Psychiatric:         Behavior: Behavior normal            I have personally reviewed pertinent films in PACS and my interpretation is as follows    MRI arthrogram left shoulder demonstrates full thickness retracted supraspinatus tear with mild atrophy, medial dislocated long head biceps  Left shoulder x-rays demonstrates no fracture or dislocation, no proximal migration of the humeral head       Scribe Attestation    I,:  Galina Riggs am acting as a scribe while in the presence of the attending physician :       I,:  Clari Marshall MD personally performed the services described in this documentation    as scribed in my presence :

## 2021-05-17 NOTE — LETTER
May 17, 2021     Patient: Todd High   YOB: 1965   Date of Visit: 5/17/2021       To Whom it May Concern:    Todd High is under my professional care  He was seen in my office on 5/17/2021  He will continue his current light duty restriction from today until the day of surgery at which time he will remain out of work  If you have any questions or concerns, please don't hesitate to call           Sincerely,          Brendan Roberts MD        CC: No Recipients

## 2021-05-17 NOTE — PATIENT INSTRUCTIONS
You are being scheduled for a shoulder arthroscopy to treat your symptoms  Below are some instructions and information on what to expect before and after your surgery  Pre-Surgical Preparation for Arthroscopic Shoulder Surgery: You will be contacted the evening prior to your surgery to confirm the scheduled time of the procedure and when to arrive at the hospital    Do not eat or drink anything after midnight the night before your surgery  Since you are having out-patient surgery, make sure that you have someone who can drive you home later in the day  Also, prepare that person for a long day, as the process of safely preparing for and recovering from the procedure is more time consuming than the actual procedure! As you will be in a sling after surgery, please wear or bring a loose fitting button-down shirt so that you can easily place this over the sling when you leave the surgical suite  This avoids having to place the operative arm in a sleeve  Most patients find that this is the easiest outfit to wear for the first week or so after surgery so you may want to plan accordingly  Most patients find that lying down in bed after shoulder surgery accentuates their discomfort  This is likely related to the effect of gravity on the swelling in the shoulder  As a result, most patients sleep better in a recliner or in bed with pillows propped up behind their back for the first few days or weeks after surgery  It is a good idea to plan for this ahead of time so there will be less hassle getting things set up the night after surgery  What to Expect After Arthroscopic Shoulder Surgery: It is normal to have swelling and discomfort in the shoulder for several days or a week after surgery  It is also normal to have a small amount of drainage from the surgical wounds (especially the first few days after surgery), as we put fluid into the shoulder to visualize the structures during surgery  It is NOT normal to have foul smelling, purulent drainage and if this is noted, please contact the office immediately or proceed to the emergency room for evaluation as this may indicate an infection  Applying ice bags to the shoulder may help with pain that is not controlled by the regional block  Ice should be applied 20-30 minutes at a time, every hour or two  Make sure to put a thin towel or T-shirt next to your skin to avoid direct contact of the ice with the skin  Icing is most helpful in the first 48 hours, although many people find that continuing past this time frame lessens their postoperative pain  Please note that your post-operative dressing is not conductive to ice, so if you need to, it is okay to remove that dressing even the night after surgery and place band-aids over the wounds in order for the ice to take effect  Pain Control    Most patients will receive a nerve block, the local anesthetic may keep your whole arm numb for up to 4 days  You will be given a prescription for narcotic pain medication when you are discharged from the hospital   With the newer nerve block that is being utilized, patients are rarely requiring the use of this narcotic pain medication  If you find you do not tolerate that type of pain medicine well, call our office and we will try another one  In addition to the narcotic pain medication, it is safe to use an anti-inflammatory (unless the patient has a medical condition that would not allow safe use of this mediation)  This includes the Advil, Motrin, Ibuprofen and Alleve category of medications  Simply follow the over the counter dosing on the package and use as indicated as another adjunct  Importantly since these medications are all very similar, use only one of them  Tylenol is a separate medication that can be utilized as well and can be taken at the same time as the other medication or given in a "staggered" manner    Just make sure that you follow the dosing on the over the counter bottle instructions  Also make sure that the pain medication prescribed by Dr Neri Garay team does not contain acetaminophen (this is found in Percocet and Vicodin)  Typically we do not prescribe those types of pain medications but if for some reason that has been prescribed DO NOT add more Tylenol (acetaminophen) as you could end up taking too much of that medication  As mentioned above, most patients find that lying down accentuates their discomfort  You might sleep better in a recliner, or propped up in bed  Dr Eliu Simms encourages patients to safely ambulate around the house as much as possible in the first few days after the procedure as this can help with blood circulation in the legs  While the incidence of blood clots is very rare following shoulder surgery, early ambulation is a great way to help decrease the already low rate  24 hours after the surgery you may remove the bandage and cover incisions with Band-Aids if needed  At that time you may shower, the wounds will have a surgical glue that will protect them from shower water but do not submerge your incisions directly (bathing or swimming) until at least 2 weeks post-operatively  It is safe to let the arm hang at the side and take a shower and put on a shirt without the sling on  Just make sure that you do not use the operative are to reach out and grab anything as that may damage the repair  When you are done showering and getting dressed please return the operative arm to the sling  Unless noted otherwise in your discharge paperwork, Dr Eliu Simms uses absorbable sutures so they do not need to be removed  Dr Jude Alba physician assistant (PA) will see you in the office a few days after the procedure to review the intra-operative findings and to initiate physical therapy if appropriate    A post-operative appointment should have been scheduled for you already, but if for some reason this did not happen, please call the office to make one  Physical therapy is important after nearly all shoulder surgeries and a detailed rehabilitation plan based on the specific intra-operative findings and procedures will be provided to your therapist at the first post-operative office visit  Most patients have post-operative therapy appointments scheduled pre-operatively, but if you do not, that will be handled at the first post-operative office visit  Unless expressly directed otherwise it is safe to remove the sling even the first day after the surgery and let the arm hang by the side  This allows patients to shower and even put a shirt on (bad arm in the sleeve first)  It is also safe to flex and extend their wrist, hand and fingers as much as possible when the block wears off  These simple motions can serve to pump fluid out of the forearm and decrease swelling in the arm

## 2021-05-21 ENCOUNTER — ANESTHESIA EVENT (OUTPATIENT)
Dept: PERIOP | Facility: AMBULARY SURGERY CENTER | Age: 56
End: 2021-05-21
Payer: OTHER MISCELLANEOUS

## 2021-05-26 ENCOUNTER — ANESTHESIA (OUTPATIENT)
Dept: PERIOP | Facility: AMBULARY SURGERY CENTER | Age: 56
End: 2021-05-26
Payer: OTHER MISCELLANEOUS

## 2021-05-26 ENCOUNTER — HOSPITAL ENCOUNTER (OUTPATIENT)
Facility: AMBULARY SURGERY CENTER | Age: 56
Setting detail: OUTPATIENT SURGERY
Discharge: HOME | End: 2021-05-26
Attending: ORTHOPAEDIC SURGERY | Admitting: ORTHOPAEDIC SURGERY
Payer: OTHER MISCELLANEOUS

## 2021-05-26 VITALS
RESPIRATION RATE: 17 BRPM | BODY MASS INDEX: 24.71 KG/M2 | SYSTOLIC BLOOD PRESSURE: 125 MMHG | HEART RATE: 77 BPM | OXYGEN SATURATION: 98 % | WEIGHT: 163 LBS | DIASTOLIC BLOOD PRESSURE: 79 MMHG | TEMPERATURE: 97.2 F | HEIGHT: 68 IN

## 2021-05-26 DIAGNOSIS — M75.102 TEAR OF LEFT SUPRASPINATUS TENDON: Primary | ICD-10-CM

## 2021-05-26 PROBLEM — M75.52 SUBACROMIAL BURSITIS OF LEFT SHOULDER JOINT: Status: RESOLVED | Noted: 2019-11-12 | Resolved: 2021-05-26

## 2021-05-26 PROCEDURE — 29823 SHO ARTHRS SRG XTNSV DBRDMT: CPT | Performed by: ORTHOPAEDIC SURGERY

## 2021-05-26 PROCEDURE — 29828 SHO ARTHRS SRG BICP TENODSIS: CPT | Performed by: ORTHOPAEDIC SURGERY

## 2021-05-26 PROCEDURE — 29827 SHO ARTHRS SRG RT8TR CUF RPR: CPT | Performed by: ORTHOPAEDIC SURGERY

## 2021-05-26 PROCEDURE — C1713 ANCHOR/SCREW BN/BN,TIS/BN: HCPCS | Performed by: ORTHOPAEDIC SURGERY

## 2021-05-26 PROCEDURE — NC001 PR NO CHARGE: Performed by: ORTHOPAEDIC SURGERY

## 2021-05-26 PROCEDURE — 29827 SHO ARTHRS SRG RT8TR CUF RPR: CPT | Performed by: PHYSICIAN ASSISTANT

## 2021-05-26 PROCEDURE — 29823 SHO ARTHRS SRG XTNSV DBRDMT: CPT | Performed by: PHYSICIAN ASSISTANT

## 2021-05-26 PROCEDURE — 29828 SHO ARTHRS SRG BICP TENODSIS: CPT | Performed by: PHYSICIAN ASSISTANT

## 2021-05-26 PROCEDURE — C9290 INJ, BUPIVACAINE LIPOSOME: HCPCS | Performed by: STUDENT IN AN ORGANIZED HEALTH CARE EDUCATION/TRAINING PROGRAM

## 2021-05-26 DEVICE — HEALIX ADVANCE KNOTLESS BR ANCHOR TCP/PLGA ABSORBABLE ANCHOR 5.5MM
Type: IMPLANTABLE DEVICE | Site: SHOULDER | Status: FUNCTIONAL
Brand: HEALIX ADVANCE

## 2021-05-26 DEVICE — DEPUY MITEK HEALIX ADVANCE 4.5 BR: Type: IMPLANTABLE DEVICE | Site: SHOULDER | Status: FUNCTIONAL

## 2021-05-26 RX ORDER — LIDOCAINE HYDROCHLORIDE 10 MG/ML
INJECTION, SOLUTION EPIDURAL; INFILTRATION; INTRACAUDAL; PERINEURAL
Status: COMPLETED | OUTPATIENT
Start: 2021-05-26 | End: 2021-05-26

## 2021-05-26 RX ORDER — CEFAZOLIN SODIUM 2 G/50ML
2000 SOLUTION INTRAVENOUS ONCE
Status: DISCONTINUED | OUTPATIENT
Start: 2021-05-26 | End: 2021-05-26 | Stop reason: HOSPADM

## 2021-05-26 RX ORDER — MIDAZOLAM HYDROCHLORIDE 2 MG/2ML
INJECTION, SOLUTION INTRAMUSCULAR; INTRAVENOUS
Status: COMPLETED | OUTPATIENT
Start: 2021-05-26 | End: 2021-05-26

## 2021-05-26 RX ORDER — ONDANSETRON 2 MG/ML
4 INJECTION INTRAMUSCULAR; INTRAVENOUS ONCE AS NEEDED
Status: DISCONTINUED | OUTPATIENT
Start: 2021-05-26 | End: 2021-05-26 | Stop reason: HOSPADM

## 2021-05-26 RX ORDER — FENTANYL CITRATE/PF 50 MCG/ML
25 SYRINGE (ML) INJECTION
Status: DISCONTINUED | OUTPATIENT
Start: 2021-05-26 | End: 2021-05-26 | Stop reason: HOSPADM

## 2021-05-26 RX ORDER — FENTANYL CITRATE 50 UG/ML
INJECTION, SOLUTION INTRAMUSCULAR; INTRAVENOUS
Status: COMPLETED | OUTPATIENT
Start: 2021-05-26 | End: 2021-05-26

## 2021-05-26 RX ORDER — LIDOCAINE HYDROCHLORIDE 10 MG/ML
0.5 INJECTION, SOLUTION EPIDURAL; INFILTRATION; INTRACAUDAL; PERINEURAL ONCE AS NEEDED
Status: DISCONTINUED | OUTPATIENT
Start: 2021-05-26 | End: 2021-05-26 | Stop reason: HOSPADM

## 2021-05-26 RX ORDER — OXYCODONE HYDROCHLORIDE AND ACETAMINOPHEN 5; 325 MG/1; MG/1
1 TABLET ORAL EVERY 8 HOURS PRN
Qty: 15 TABLET | Refills: 0 | Status: SHIPPED | OUTPATIENT
Start: 2021-05-26 | End: 2021-06-05

## 2021-05-26 RX ORDER — KETOROLAC TROMETHAMINE 30 MG/ML
INJECTION, SOLUTION INTRAMUSCULAR; INTRAVENOUS AS NEEDED
Status: DISCONTINUED | OUTPATIENT
Start: 2021-05-26 | End: 2021-05-26

## 2021-05-26 RX ORDER — BUPIVACAINE HYDROCHLORIDE 5 MG/ML
INJECTION, SOLUTION PERINEURAL
Status: COMPLETED | OUTPATIENT
Start: 2021-05-26 | End: 2021-05-26

## 2021-05-26 RX ORDER — DEXAMETHASONE SODIUM PHOSPHATE 10 MG/ML
INJECTION, SOLUTION INTRAMUSCULAR; INTRAVENOUS AS NEEDED
Status: DISCONTINUED | OUTPATIENT
Start: 2021-05-26 | End: 2021-05-26

## 2021-05-26 RX ORDER — CHLORHEXIDINE GLUCONATE 0.12 MG/ML
15 RINSE ORAL ONCE
Status: DISCONTINUED | OUTPATIENT
Start: 2021-05-26 | End: 2021-05-26 | Stop reason: HOSPADM

## 2021-05-26 RX ORDER — ONDANSETRON 2 MG/ML
INJECTION INTRAMUSCULAR; INTRAVENOUS AS NEEDED
Status: DISCONTINUED | OUTPATIENT
Start: 2021-05-26 | End: 2021-05-26

## 2021-05-26 RX ORDER — METOCLOPRAMIDE HYDROCHLORIDE 5 MG/ML
10 INJECTION INTRAMUSCULAR; INTRAVENOUS ONCE AS NEEDED
Status: DISCONTINUED | OUTPATIENT
Start: 2021-05-26 | End: 2021-05-26 | Stop reason: HOSPADM

## 2021-05-26 RX ORDER — SODIUM CHLORIDE, SODIUM LACTATE, POTASSIUM CHLORIDE, CALCIUM CHLORIDE 600; 310; 30; 20 MG/100ML; MG/100ML; MG/100ML; MG/100ML
125 INJECTION, SOLUTION INTRAVENOUS CONTINUOUS
Status: DISCONTINUED | OUTPATIENT
Start: 2021-05-26 | End: 2021-05-26 | Stop reason: HOSPADM

## 2021-05-26 RX ORDER — CEFAZOLIN SODIUM 2 G/50ML
SOLUTION INTRAVENOUS AS NEEDED
Status: DISCONTINUED | OUTPATIENT
Start: 2021-05-26 | End: 2021-05-26

## 2021-05-26 RX ORDER — LIDOCAINE HYDROCHLORIDE 10 MG/ML
INJECTION, SOLUTION EPIDURAL; INFILTRATION; INTRACAUDAL; PERINEURAL AS NEEDED
Status: DISCONTINUED | OUTPATIENT
Start: 2021-05-26 | End: 2021-05-26

## 2021-05-26 RX ORDER — PROPOFOL 10 MG/ML
INJECTION, EMULSION INTRAVENOUS AS NEEDED
Status: DISCONTINUED | OUTPATIENT
Start: 2021-05-26 | End: 2021-05-26

## 2021-05-26 RX ADMIN — KETOROLAC TROMETHAMINE 30 MG: 30 INJECTION, SOLUTION INTRAMUSCULAR at 11:14

## 2021-05-26 RX ADMIN — DEXAMETHASONE SODIUM PHOSPHATE 4 MG: 10 INJECTION, SOLUTION INTRAMUSCULAR; INTRAVENOUS at 10:18

## 2021-05-26 RX ADMIN — BUPIVACAINE 20 ML: 13.3 INJECTION, SUSPENSION, LIPOSOMAL INFILTRATION at 09:26

## 2021-05-26 RX ADMIN — ONDANSETRON 4 MG: 2 INJECTION INTRAMUSCULAR; INTRAVENOUS at 10:16

## 2021-05-26 RX ADMIN — LIDOCAINE HYDROCHLORIDE 40 MG: 10 INJECTION, SOLUTION EPIDURAL; INFILTRATION; INTRACAUDAL at 10:16

## 2021-05-26 RX ADMIN — PROPOFOL 200 MG: 10 INJECTION, EMULSION INTRAVENOUS at 10:16

## 2021-05-26 RX ADMIN — BUPIVACAINE HYDROCHLORIDE 7 ML: 5 INJECTION, SOLUTION PERINEURAL at 09:25

## 2021-05-26 RX ADMIN — MIDAZOLAM HYDROCHLORIDE 2 MG: 1 INJECTION, SOLUTION INTRAMUSCULAR; INTRAVENOUS at 09:25

## 2021-05-26 RX ADMIN — DEXAMETHASONE SODIUM PHOSPHATE 4 MG: 10 INJECTION, SOLUTION INTRAMUSCULAR; INTRAVENOUS at 10:16

## 2021-05-26 RX ADMIN — LIDOCAINE HYDROCHLORIDE 0.5 ML: 10 INJECTION, SOLUTION EPIDURAL; INFILTRATION; INTRACAUDAL at 09:25

## 2021-05-26 RX ADMIN — SODIUM CHLORIDE, SODIUM LACTATE, POTASSIUM CHLORIDE, AND CALCIUM CHLORIDE 125 ML/HR: .6; .31; .03; .02 INJECTION, SOLUTION INTRAVENOUS at 09:05

## 2021-05-26 RX ADMIN — CEFAZOLIN SODIUM 2000 MG: 2 SOLUTION INTRAVENOUS at 10:11

## 2021-05-26 RX ADMIN — FENTANYL CITRATE 50 MCG: 50 INJECTION, SOLUTION INTRAMUSCULAR; INTRAVENOUS at 10:16

## 2021-05-26 RX ADMIN — SODIUM CHLORIDE, SODIUM LACTATE, POTASSIUM CHLORIDE, AND CALCIUM CHLORIDE: .6; .31; .03; .02 INJECTION, SOLUTION INTRAVENOUS at 09:37

## 2021-05-26 RX ADMIN — FENTANYL CITRATE 50 MCG: 50 INJECTION, SOLUTION INTRAMUSCULAR; INTRAVENOUS at 09:25

## 2021-05-26 NOTE — ANESTHESIA PREPROCEDURE EVALUATION
Procedure:  SHOULDER ARTHROSCOPIC ROTATOR CUFF REPAIR - POSSIBLE SUPERIOR CAPSULE RECONSTRUCTION (Left Shoulder)    Relevant Problems   ANESTHESIA  No prior GA; no fam hx      CARDIO (within normal limits)      NEURO/PSYCH   (+) Chronic left shoulder pain      PULMONARY (within normal limits)  marijuana use   (-) Sleep apnea   (-) URI (upper respiratory infection) (Hx mild COVID this spring; resolved)        Physical Exam    Airway    Mallampati score: I  TM Distance: >3 FB  Neck ROM: full     Dental   No notable dental hx     Cardiovascular      Pulmonary      Other Findings        Anesthesia Plan  ASA Score- 1     Anesthesia Type- general and regional with ASA Monitors  Additional Monitors:   Airway Plan: LMA  Plan Factors-Exercise tolerance (METS): >4 METS  Chart reviewed  Existing labs reviewed  Patient summary reviewed  Patient is a current smoker  Induction- intravenous  Postoperative Plan-     Informed Consent- Anesthetic plan and risks discussed with patient and spouse  I personally reviewed this patient with the CRNA  Discussed and agreed on the Anesthesia Plan with the CRNA  Velasquez Saluda

## 2021-05-26 NOTE — DISCHARGE INSTRUCTIONS
You are being scheduled for a shoulder arthroscopy to treat your symptoms  Below are some instructions and information on what to expect before and after your surgery  Pre-Surgical Preparation for Arthroscopic Shoulder Surgery: You will be contacted the evening prior to your surgery to confirm the scheduled time of the procedure and when to arrive at the hospital    Do not eat or drink anything after midnight the night before your surgery  Since you are having out-patient surgery, make sure that you have someone who can drive you home later in the day  Also, prepare that person for a long day, as the process of safely preparing for and recovering from the procedure is more time consuming than the actual procedure! As you will be in a sling after surgery, please wear or bring a loose fitting button-down shirt so that you can easily place this over the sling when you leave the surgical suite  This avoids having to place the operative arm in a sleeve  Most patients find that this is the easiest outfit to wear for the first week or so after surgery so you may want to plan accordingly  Most patients find that lying down in bed after shoulder surgery accentuates their discomfort  This is likely related to the effect of gravity on the swelling in the shoulder  As a result, most patients sleep better in a recliner or in bed with pillows propped up behind their back for the first few days or weeks after surgery  It is a good idea to plan for this ahead of time so there will be less hassle getting things set up the night after surgery  What to Expect After Arthroscopic Shoulder Surgery: It is normal to have swelling and discomfort in the shoulder for several days or a week after surgery  It is also normal to have a small amount of drainage from the surgical wounds (especially the first few days after surgery), as we put fluid into the shoulder to visualize the structures during surgery  It is NOT normal to have foul smelling, purulent drainage and if this is noted, please contact the office immediately or proceed to the emergency room for evaluation as this may indicate an infection  Applying ice bags to the shoulder may help with pain that is not controlled by the regional block  Ice should be applied 20-30 minutes at a time, every hour or two  Make sure to put a thin towel or T-shirt next to your skin to avoid direct contact of the ice with the skin  Icing is most helpful in the first 48 hours, although many people find that continuing past this time frame lessens their postoperative pain  Please note that your post-operative dressing is not conductive to ice, so if you need to, it is okay to remove that dressing even the night after surgery and place band-aids over the wounds in order for the ice to take effect  Pain Control    Most patients will receive a nerve block, the local anesthetic may keep your whole arm numb for up to 4 days  You will be given a prescription for narcotic pain medication when you are discharged from the hospital   With the newer nerve block that is being utilized, patients are rarely requiring the use of this narcotic pain medication  If you find you do not tolerate that type of pain medicine well, call our office and we will try another one  In addition to the narcotic pain medication, it is safe to use an anti-inflammatory (unless the patient has a medical condition that would not allow safe use of this mediation)  This includes the Advil, Motrin, Ibuprofen and Alleve category of medications  Simply follow the over the counter dosing on the package and use as indicated as another adjunct  Importantly since these medications are all very similar, use only one of them  Tylenol is a separate medication that can be utilized as well and can be taken at the same time as the other medication or given in a "staggered" manner    Just make sure that you follow the dosing on the over the counter bottle instructions  Also make sure that the pain medication prescribed by Dr Titi Hutchison team does not contain acetaminophen (this is found in Percocet and Vicodin)  Typically we do not prescribe those types of pain medications but if for some reason that has been prescribed DO NOT add more Tylenol (acetaminophen) as you could end up taking too much of that medication  As mentioned above, most patients find that lying down accentuates their discomfort  You might sleep better in a recliner, or propped up in bed  Dr Lacey Posada encourages patients to safely ambulate around the house as much as possible in the first few days after the procedure as this can help with blood circulation in the legs  While the incidence of blood clots is very rare following shoulder surgery, early ambulation is a great way to help decrease the already low rate  24 hours after the surgery you may remove the bandage and cover incisions with Band-Aids if needed  At that time you may shower, the wounds will have a surgical glue that will protect them from shower water but do not submerge your incisions directly (bathing or swimming) until at least 2 weeks post-operatively  It is safe to let the arm hang at the side and take a shower and put on a shirt without the sling on  Just make sure that you do not use the operative are to reach out and grab anything as that may damage the repair  When you are done showering and getting dressed please return the operative arm to the sling  Unless noted otherwise in your discharge paperwork, Dr Lacey Posada uses absorbable sutures so they do not need to be removed  Dr Cheryl Hull physician assistant (PA) will see you in the office a few days after the procedure to review the intra-operative findings and to initiate physical therapy if appropriate    A post-operative appointment should have been scheduled for you already, but if for some reason this did not happen, please call the office to make one  Physical therapy is important after nearly all shoulder surgeries and a detailed rehabilitation plan based on the specific intra-operative findings and procedures will be provided to your therapist at the first post-operative office visit  Most patients have post-operative therapy appointments scheduled pre-operatively, but if you do not, that will be handled at the first post-operative office visit  Unless expressly directed otherwise it is safe to remove the sling even the first day after the surgery and let the arm hang by the side  This allows patients to shower and even put a shirt on (bad arm in the sleeve first)  It is also safe to flex and extend their wrist, hand and fingers as much as possible when the block wears off  These simple motions can serve to pump fluid out of the forearm and decrease swelling in the arm

## 2021-05-26 NOTE — OP NOTE
OPERATIVE REPORT  PATIENT NAME: Hamilton Yates    :  1965  MRN: [id number]  Pt Location: AN  OR ROOM 06    SURGERY DATE: 2021     SURGEON: Gwendolyn Sher MD     ASSISTANT: James Harrison PA-C     NOTE: James Harrison PA-C was present throughout the entire procedure and performed essential assistance with patient prepping, draping, positioning, suture management, wound closure, sterile dressing application and sling application, all under my direct supervision  NOTE: No qualified resident physician was available for assistance    PREOPERATIVE DIAGNOSIS:  Left Shoulder Supraspinatus Tear and Long Head Biceps Tendon Subluxation    POSTOPERATIVE DIAGNOSIS: Same    PROCEDURES: Surgical Arthroscopy Left Shoulder with Rotator Cuff Repair, Long Head Biceps Tenodesis and Extensive Debridement    ANESTHESIA STAFF: Dharmesh Torres MD     ANESTHESIA TYPE: General LMA with ultrasound guided interscalene block (Exparel)  The interscalene block was provided by the anesthesia staff per my request for postoperative pain control and to decrease the use of postoperative narcotic medication for pain control  COMPLICATIONS: None    FINDINGS: Supraspinatus Tear and Assosciated Long Head Biceps Tendon Subluxation    SPECIMEN(S):  None    ESTIMATED BLOOD LOSS: Minimal    INDICATION:  Briefly, the patient is a 60 y o   male with left shoulder pain  MRI scan confirmed a full thickness supraspinatus tear with retraction but only a small amount of atrophy as well as an associated long-head biceps tendon subluxation  The patient elected for arthroscopic treatment and understood that repair would be our primary goal but if there tear was unable to be repaired then debridement versus a superior capsular reconstruction would have to be considered    Informed consent was obtained after a thorough discussion of the risks and benefits of the procedure, as well as alternatives to the procedure       OPERATIVE TECHNIQUE:  On the day of surgery, I identified the patients left shoulder and marked it with my initials  The patient was taken to the operating room where anesthesia was induced and 2 grams of IV Cefazolin were given  The patient was examined in the supine position and was found to have full range of motion of the left shoulder with no instability   The patient was then positioned in the [address] [place] [address] chair position  All bony prominences were padded  The shoulder was prepped and draped in normal sterile fashion  After a time-out for safety, a standard posterolateral arthroscopic portal was made  Glenohumeral evaluation revealed early degenerative changes humeral head and glenoid with no loose bodies  There was a full thickness supraspinatus tear with an associated long head biceps tendon subluxation  The infraspinatus was intact and the subscapularis had fraying but no mercedez tear  There was degenerative changes of the glenoid labrum  A shaving device was introduced and extensive debridement of the humeral head articular cartilage, the glenoid articular cartilage, the anterior and posterior glenoid labrum as well as the long-head biceps tendon anchor complex was debrided to a stable border  After debridement no full-thickness cartilage loss of the humeral head or glenoid was seen and only mild grade 2 changes were present  After the intra-articular work was completed, the scope was then placed in the subacromial space through the same portal where a thorough debridement of the subacromial bursa (bursectomy) was performed  The full thickness supraspinatus tear was found to measure 2 2 cm from anterior to posterior and was retracted to the glenoid but after releasing superiorly and inferiorly I was able to reduce the tendon to the tuberosity without undue tension allowing for primary repair    The tuberosity was prepared in routine fashion and a double row suture bridge configuration repair with two medial 4 5 mm double loaded Mitek Healix Advanced anchors and one lateral 5 5 mm Mitek Healix Advanced anchor using three horizontal mattress stitches and one simple stich (7 strands through the tendon) through the tendon was performed achieving near anatomic reduction of the rotator cuff tendon to the tuberosity  The long head of biceps tendon was indicated for tenodesis and it was incorporated into the rotator cuff repair by using the anterior limb of the most anterior anchor through and around the long head of biceps in a loop whipstitch fashion; the long head of biceps was then released  When the medial row of the rotator cuff repair was tied down, this incorporated the long head biceps tenodesis into our repair  The CA ligament was frayed but given the retracted nature of the supraspinatus tendon tear and my inability to predict whether this would heal I did not release the ligament for fear of predisposing the patient anterior superior escape so instead a debridement of the CA ligament was performed to a stable border  The area was then irrigated  Scope was withdrawn  Wounds were closed with 4-0 Monocryl and Histoacryl  Sterile dressings and a sling with an abduction pillow was placed  The patient was awoken without complication and returned to the recovery room in good condition  We will see the patient back in the office next week to initiate therapy following standard rotator cuff repair rehabilitation protocol  At the end of procedure, the counts were correct       PATIENT DISPOSITION:  Stable to PACU      SIGNATURE: Rome Genao MD  DATE: May 26, 2021  TIME: 11:28 AM

## 2021-05-26 NOTE — ANESTHESIA POSTPROCEDURE EVALUATION
Post-Op Assessment Note    CV Status:  Stable  Pain Score: 0    Pain management: adequate     Mental Status:  Alert and awake   Hydration Status:  Euvolemic   PONV Controlled:  None   Airway Patency:  Patent      Post Op Vitals Reviewed: Yes      Staff: Anesthesiologist, CRNA   Comments: vss        No complications documented      BP      Temp      Pulse     Resp      SpO2

## 2021-05-26 NOTE — ANESTHESIA PROCEDURE NOTES
Peripheral Block    Patient location during procedure: holding area  Start time: 5/26/2021 9:26 AM  Reason for block: at surgeon's request and post-op pain management  Staffing  Anesthesiologist: Joaquina Cannon MD  Performed: anesthesiologist   Preanesthetic Checklist  Completed: patient identified, site marked, surgical consent, pre-op evaluation, timeout performed, IV checked, risks and benefits discussed and monitors and equipment checked  Peripheral Block  Patient position: sitting  Prep: ChloraPrep  Patient monitoring: heart rate, continuous pulse ox and frequent blood pressure checks  Block type: interscalene  Laterality: left  Injection technique: single-shot  Procedures: ultrasound guided, Ultrasound guidance required for the procedure to increase accuracy and safety of medication placement and decrease risk of complications  Ultrasound permanent image savedbupivacaine (MARCAINE) 0 5 % perineural infiltration, 7 mL  midazolam (VERSED) 2 mg/2 mL IV, 2 mg  fentaNYL 50 mcg/mL IV, 50 mcg  lidocaine (PF) (XYLOCAINE-MPF) 1 % infiltration, 0 5 mL  Needle  Needle type: Stimuplex   Needle gauge: 22 G  Needle length: 5 cm  Needle localization: ultrasound guidance  Assessment  Injection assessment: incremental injection, local visualized surrounding nerve on ultrasound, negative aspiration for heme and no paresthesia on injection  Paresthesia pain: none  Heart rate change: no  Post-procedure:  site cleaned  patient tolerated the procedure well with no immediate complications  Additional Notes  Picture uploaded via [employer] julius  Exparel bracelet appied    Wife present throughout - very well tolerated

## 2021-05-26 NOTE — H&P
I identified and marked the patient in the pre-op holding area after confirming the surgical consent  No changes to medical health since the H&P was preformed  The patient's [facility] to ensure compliance with applicable state laws

## 2021-06-02 ENCOUNTER — EVALUATION (OUTPATIENT)
Dept: PHYSICAL THERAPY | Facility: MEDICAL CENTER | Age: 56
End: 2021-06-02
Payer: OTHER MISCELLANEOUS

## 2021-06-02 DIAGNOSIS — S46.102D INJURY OF TENDON OF LONG HEAD OF BICEPS, LEFT, SUBSEQUENT ENCOUNTER: ICD-10-CM

## 2021-06-02 DIAGNOSIS — M75.102 TEAR OF LEFT SUPRASPINATUS TENDON: ICD-10-CM

## 2021-06-02 PROCEDURE — 97164 PT RE-EVAL EST PLAN CARE: CPT | Performed by: PHYSICAL THERAPIST

## 2021-06-02 PROCEDURE — 97140 MANUAL THERAPY 1/> REGIONS: CPT | Performed by: PHYSICAL THERAPIST

## 2021-06-02 NOTE — PROGRESS NOTES
PT Evaluation     Today's date: 2021  Patient name: Zoe Birmingham  : 1965  MRN: [id number]  Referring provider: Adri Vickers MD  Dx:   Encounter Diagnosis     ICD-10-CM    1  Tear of left supraspinatus tendon  M75 102 Ambulatory referral to Physical Therapy   2  Injury of tendon of long head of biceps, left, subsequent encounter  S46 102D Ambulatory referral to Physical Therapy                  Assessment  Assessment details: Zoe Birmingham is a 60 y o  male who presents with Tear of left supraspinatus tendon  Injury of tendon of long head of biceps, left, subsequent encounter  Patient presents alert and oriented with the above impairments  Andra Quiroz will benefit from PT to addres deficits in order to maximize and return to prior level of function  No further referral appears necessary at this time based upon examination results  Prognosis is good given HEP compliance  Please contact me if you have any questions or recommendations  Reviewed protocol w/ patient today  Impairments: abnormal or restricted ROM, abnormal movement, activity intolerance, impaired physical strength, lacks appropriate home exercise program and pain with function  Understanding of Dx/Px/POC: excellent  Goals  Short Term Goals:  1  Pain decreased 3 subjective ratings in 6 weeks  2  PROM within normal limits in 6 weeks  3  Full pain free cervical, wrist, and elbow ROM in 6 weeks    Long Term Goals:  1  AROM within normal limits in 12 weeks  2  Patient will be independent with IADLS/ADLS  2    Independent with HEP    Plan  Patient would benefit from: skilled physical therapy  Planned modality interventions: cryotherapy  Planned therapy interventions: therapeutic exercise, stretching, strengthening, patient education, neuromuscular re-education, manual therapy, home exercise program, functional ROM exercises and flexibility  Frequency: 2x week  Duration in weeks: 12  Treatment plan discussed with: patient        Subjective Evaluation    History of Present Illness  Mechanism of injury: Pt reports injuring L shoulder at work in 2019  At that time he was sent for xray and referred to PT  He did improve w/ some conservative treatment; however, pain and weakness did persist   He returned to ortho earlier this year and was sent for MRI which revealed supraspinatus and bicep tear  He underwent RTC repair w/ bicep tenodesis on May 26  He was d/c home wearing sling w/ abduction pillow that he has been wearing at most times  He did have trouble sleeping last night  Overall, pain has been manageable since surgery w/ the exception of last night  He is requiring assistance w/ bathing and dressing  He has resumed driving short distances    He is employed as [employer]  Pain  At best pain ratin  At worst pain ratin    Patient Goals  Patient goals for therapy: decreased pain, increased motion, increased strength, independence with ADLs/IADLs and return to work          Objective     Observations     Additional Observation Details  No redness noted around incisions/bandages    Active Range of Motion   Cervical/Thoracic Spine     Normal active range of motion    Passive Range of Motion   Left Shoulder   Flexion: 75 degrees   Abduction: 60 degrees   External rotation 45°: 30 degrees     Right Shoulder   Normal passive range of motion    Left Elbow   Normal passive range of motion    Left Wrist   Normal passive range of motion             Precautions: protocol      Manuals 6/2            PROM L shoulder within protocol 10 min                                                   Neuro Re-Ed                                                                                                        Ther Ex 6/2            Cervical AROM x10            Wrist AROM x10            scap squeezes nv            pendulums x10            digiflex nv                                                   Ther Activity Gait Training                                       Modalities 6/2            CP post np

## 2021-06-03 ENCOUNTER — OFFICE VISIT (OUTPATIENT)
Dept: OBGYN CLINIC | Facility: OTHER | Age: 56
End: 2021-06-03

## 2021-06-03 ENCOUNTER — OFFICE VISIT (OUTPATIENT)
Dept: FAMILY MEDICINE CLINIC | Facility: MEDICAL CENTER | Age: 56
End: 2021-06-03
Payer: COMMERCIAL

## 2021-06-03 VITALS
WEIGHT: 164.4 LBS | TEMPERATURE: 98.8 F | RESPIRATION RATE: 16 BRPM | SYSTOLIC BLOOD PRESSURE: 138 MMHG | HEART RATE: 88 BPM | BODY MASS INDEX: 25 KG/M2 | OXYGEN SATURATION: 97 % | DIASTOLIC BLOOD PRESSURE: 80 MMHG

## 2021-06-03 VITALS
WEIGHT: 163 LBS | BODY MASS INDEX: 24.71 KG/M2 | HEART RATE: 72 BPM | HEIGHT: 68 IN | DIASTOLIC BLOOD PRESSURE: 105 MMHG | SYSTOLIC BLOOD PRESSURE: 149 MMHG

## 2021-06-03 DIAGNOSIS — R03.0 ELEVATED BLOOD PRESSURE READING: Primary | ICD-10-CM

## 2021-06-03 DIAGNOSIS — Z00.00 ROUTINE ADULT HEALTH MAINTENANCE: ICD-10-CM

## 2021-06-03 DIAGNOSIS — M75.102 TEAR OF LEFT SUPRASPINATUS TENDON: Primary | ICD-10-CM

## 2021-06-03 DIAGNOSIS — Z13.29 THYROID DISORDER SCREENING: ICD-10-CM

## 2021-06-03 DIAGNOSIS — Z12.5 SCREENING PSA (PROSTATE SPECIFIC ANTIGEN): ICD-10-CM

## 2021-06-03 PROCEDURE — 99396 PREV VISIT EST AGE 40-64: CPT | Performed by: FAMILY MEDICINE

## 2021-06-03 PROCEDURE — 99024 POSTOP FOLLOW-UP VISIT: CPT | Performed by: ORTHOPAEDIC SURGERY

## 2021-06-03 NOTE — PROGRESS NOTES
Assessment:  1  Tear of left supraspinatus tendon           Surgery: Shoulder Arthroscopic Rotator Cuff Repair, Biceps Tenodesis, Extensive Debridement - Left  Date of Surgery: 5/26/2021        Discussion and Plan:   · Review operative photos with the patient today  ·  we were able to get a complete repair of his large retracted tear and I have cautioned him to be very careful with his rehabilitation and does slowly, it is my hope this will heal and he will be very happy with the outcome  · Patient provided with OP note and copy of photos  · Continue physical therapy per protocol, discussed we need to take it slow  · Continue sling per protocol, may discontinue pillow      Follow Up:  Return in about 2 months (around 8/3/2021) for Liliana Baumann PA-C         CHIEF COMPLAINT:  Chief Complaint   Patient presents with    Left Shoulder - Post-op         SUBJECTIVE:  Hamilton Yates is a 60y o  year old male who presents for follow up after Shoulder Arthroscopic Rotator Cuff Repair, Biceps Tenodesis, Extensive Debridement - Left  Today patient states he is doing well and his pain is controlled         PHYSICAL EXAMINATION:  General: well developed and well nourished, alert, oriented times 3 and appears comfortable  Psychiatric: Normal    MUSCULOSKELETAL EXAMINATION:  Incision: Clean, dry, intact  Surgery Site: normal, no evidence of infection   Range of Motion: As expected  Neurovascular status: Neuro intact, good cap refill  Activity Restrictions: sling        Scribe Attestation    I,:  Tommie Howard am acting as a scribe while in the presence of the attending physician :       I,:  Nabil Conte MD personally performed the services described in this documentation    as scribed in my presence :
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
## 2021-06-04 ENCOUNTER — TELEPHONE (OUTPATIENT)
Dept: FAMILY MEDICINE CLINIC | Facility: MEDICAL CENTER | Age: 56
End: 2021-06-04

## 2021-06-04 ENCOUNTER — OFFICE VISIT (OUTPATIENT)
Dept: PHYSICAL THERAPY | Facility: MEDICAL CENTER | Age: 56
End: 2021-06-04
Payer: OTHER MISCELLANEOUS

## 2021-06-04 DIAGNOSIS — M75.102 TEAR OF LEFT SUPRASPINATUS TENDON: Primary | ICD-10-CM

## 2021-06-04 DIAGNOSIS — Z12.11 SCREEN FOR COLON CANCER: Primary | ICD-10-CM

## 2021-06-04 DIAGNOSIS — S46.102D INJURY OF TENDON OF LONG HEAD OF BICEPS, LEFT, SUBSEQUENT ENCOUNTER: ICD-10-CM

## 2021-06-04 PROCEDURE — 97110 THERAPEUTIC EXERCISES: CPT | Performed by: PHYSICAL THERAPIST

## 2021-06-04 PROCEDURE — 97140 MANUAL THERAPY 1/> REGIONS: CPT | Performed by: PHYSICAL THERAPIST

## 2021-06-04 NOTE — PROGRESS NOTES
Daily Note     Today's date: 2021  Patient name: Marly Aragon  : 1965  MRN: [id number]  Referring provider: Gilberto Espinoza MD  Dx:   Encounter Diagnosis     ICD-10-CM    1  Tear of left supraspinatus tendon  M75 102    2  Injury of tendon of long head of biceps, left, subsequent encounter  S46 102D                   Subjective: Pt had f/u w/ ortho this week; advised to follow protocol and progress slowly due to size of tear that was repaired  He offers no new complaints today  Continues to use at home  Has been compliant w/ sling; no longer using abduction pillow per MD advisement  Objective: See treatment diary below      Assessment: Tolerated treatment well  Patient demonstrated fatigue post treatment, exhibited good technique with therapeutic exercises and would benefit from continued PT  Most tightness noted w/ passive flexion  Plan: Continue per plan of care        Precautions: protocol      Manuals            PROM L shoulder within protocol 10 min 10 min                                                  Neuro Re-Ed                                                                                                        Ther Ex            Cervical AROM x10 x10           Wrist AROM x10 x10           scap squeezes nv 5"x15           pendulums x10 x10           digiflex nv Red 5"x20                                                  Ther Activity                                       Gait Training                                       Modalities 6/2            CP post np

## 2021-06-04 NOTE — TELEPHONE ENCOUNTER
----- Message from Isai Leon MD sent at 6/3/2021 10:19 PM EDT -----   Patient requested that we make a referral to [facility]  for his colonoscopy  Due to his  recent shoulder surgery he asks if this can be done in September  Please arrange this if possible  Patient has history of ulcerative colitis

## 2021-06-04 NOTE — TELEPHONE ENCOUNTER
We placed the referral for the colonoscopy  I am not sure how you are scheduling but if you could help accommodate the patient in scheduling his procedure sometime in September it would be appreciated  If you could just let us know the appt time as well and if you were able to reach the patient

## 2021-06-07 ENCOUNTER — OFFICE VISIT (OUTPATIENT)
Dept: PHYSICAL THERAPY | Facility: MEDICAL CENTER | Age: 56
End: 2021-06-07
Payer: OTHER MISCELLANEOUS

## 2021-06-07 DIAGNOSIS — M75.102 TEAR OF LEFT SUPRASPINATUS TENDON: Primary | ICD-10-CM

## 2021-06-07 DIAGNOSIS — S46.102D INJURY OF TENDON OF LONG HEAD OF BICEPS, LEFT, SUBSEQUENT ENCOUNTER: ICD-10-CM

## 2021-06-07 PROCEDURE — 97110 THERAPEUTIC EXERCISES: CPT | Performed by: PHYSICAL THERAPIST

## 2021-06-07 PROCEDURE — 97140 MANUAL THERAPY 1/> REGIONS: CPT | Performed by: PHYSICAL THERAPIST

## 2021-06-07 NOTE — PROGRESS NOTES
Daily Note     Today's date: 2021  Patient name: Amando Lofton  : 1965  MRN: [id number]  Referring provider: Mamadou Ghotra MD  Dx:   Encounter Diagnosis     ICD-10-CM    1  Tear of left supraspinatus tendon  M75 102    2  Injury of tendon of long head of biceps, left, subsequent encounter  S46 102D                   Subjective:  Pt reports feeling good over the weekend  He does c/o some forearm tightness  Objective: See treatment diary below      Assessment: Tolerated treatment well  Patient demonstrated fatigue post treatment, exhibited good technique with therapeutic exercises and would benefit from continued PT  PROM all within protocol limits  Initiated graston over extensor muscle mass; assess response next visit  Plan: Continue per plan of care        Precautions: protocol      Manuals           PROM L shoulder within protocol 10 min 10 min 10 min          graston L extensor muscle mass   5 min                                    Neuro Re-Ed                                                                                                        Ther Ex           Cervical AROM x10 x10 x15          Wrist AROM x10 x10 x15          scap squeezes nv 5"x15 5"x20          pendulums x10 x10 x10          digiflex nv Red 5"x20 Red 5"x20                                                 Ther Activity                                       Gait Training                                       Modalities             CP post np

## 2021-06-10 ENCOUNTER — OFFICE VISIT (OUTPATIENT)
Dept: PHYSICAL THERAPY | Facility: MEDICAL CENTER | Age: 56
End: 2021-06-10
Payer: OTHER MISCELLANEOUS

## 2021-06-10 DIAGNOSIS — S46.102D INJURY OF TENDON OF LONG HEAD OF BICEPS, LEFT, SUBSEQUENT ENCOUNTER: ICD-10-CM

## 2021-06-10 DIAGNOSIS — M75.102 TEAR OF LEFT SUPRASPINATUS TENDON: Primary | ICD-10-CM

## 2021-06-10 PROCEDURE — 97140 MANUAL THERAPY 1/> REGIONS: CPT | Performed by: PHYSICAL THERAPIST

## 2021-06-10 PROCEDURE — 97110 THERAPEUTIC EXERCISES: CPT | Performed by: PHYSICAL THERAPIST

## 2021-06-10 NOTE — PROGRESS NOTES
Daily Note     Today's date: 6/10/2021  Patient name: Cherelle Dominguez  : 1965  MRN: [id number]  Referring provider: Chapincito Greer MD  Dx:   Encounter Diagnosis     ICD-10-CM    1  Tear of left supraspinatus tendon  M75 102    2  Injury of tendon of long head of biceps, left, subsequent encounter  S46 102D                   Subjective:  Pt reports continued improvement  Objective: See treatment diary below      Assessment: Tolerated treatment well  Patient demonstrated fatigue post treatment, exhibited good technique with therapeutic exercises and would benefit from continued PT  PROM remains within protocol limits  Plan: Continue per plan of care        Precautions: protocol      Manuals 6/2 6/4 6/7 6/10         PROM L shoulder within protocol 10 min 10 min 10 min 10 min         graston L extensor muscle mass   5 min np                                   Neuro Re-Ed                                                                                                        Ther Ex 6/2 6/4 6/7 6/10         Cervical AROM x10 x10 x15 x15         Wrist AROM x10 x10 x15 x15         scap squeezes nv 5"x15 5"x20 5"x20         pendulums x10 x10 x10 x10         digiflex nv Red 5"x20 Red 5"x20 Red 5"x20                                                Ther Activity                                       Gait Training                                       Modalities /            CP post np

## 2021-06-14 ENCOUNTER — OFFICE VISIT (OUTPATIENT)
Dept: PHYSICAL THERAPY | Facility: MEDICAL CENTER | Age: 56
End: 2021-06-14
Payer: OTHER MISCELLANEOUS

## 2021-06-14 DIAGNOSIS — S46.102D INJURY OF TENDON OF LONG HEAD OF BICEPS, LEFT, SUBSEQUENT ENCOUNTER: ICD-10-CM

## 2021-06-14 DIAGNOSIS — M75.102 TEAR OF LEFT SUPRASPINATUS TENDON: Primary | ICD-10-CM

## 2021-06-14 PROCEDURE — 97140 MANUAL THERAPY 1/> REGIONS: CPT | Performed by: PHYSICAL THERAPIST

## 2021-06-14 PROCEDURE — 97110 THERAPEUTIC EXERCISES: CPT | Performed by: PHYSICAL THERAPIST

## 2021-06-14 NOTE — PROGRESS NOTES
Daily Note     Today's date: 2021  Patient name: Enrico Sierra  : 1965  MRN: [id number]  Referring provider: Willie Barrientos MD  Dx:   Encounter Diagnosis     ICD-10-CM    1  Tear of left supraspinatus tendon  M75 102    2  Injury of tendon of long head of biceps, left, subsequent encounter  S46 102D        Start Time: 0800  Stop Time: 0830  Total time in clinic (min): 30 minutes    Subjective:  Pt offers no complaints today  Occasional soreness noted throughout the day in bicep and anterior shoulder  Objective: See treatment diary below      Assessment: Tolerated treatment well  Patient demonstrated fatigue post treatment, exhibited good technique with therapeutic exercises and would benefit from continued PT  Feels mild soreness at protocol limits     Plan: Continue per plan of care        Precautions: protocol      Manuals 6/2 6/4 6/7 6/10 6/14        PROM L shoulder within protocol 10 min 10 min 10 min 10 min 10 min        graston L extensor muscle mass   5 min np                                   Neuro Re-Ed                                                                                                        Ther Ex 6/2 6/4 6/7 6/10 6/14        Cervical AROM x10 x10 x15 x15 x15        Wrist AROM x10 x10 x15 x15 x15        scap squeezes nv 5"x15 5"x20 5"x20 5"x20        pendulums x10 x10 x10 x10 x10        digiflex nv Red 5"x20 Red 5"x20 Red 5"x20 Red 5"x20                                               Ther Activity                                       Gait Training                                       Modalities 6/            CP post np

## 2021-06-16 ENCOUNTER — OFFICE VISIT (OUTPATIENT)
Dept: PHYSICAL THERAPY | Facility: MEDICAL CENTER | Age: 56
End: 2021-06-16
Payer: OTHER MISCELLANEOUS

## 2021-06-16 DIAGNOSIS — S46.102D INJURY OF TENDON OF LONG HEAD OF BICEPS, LEFT, SUBSEQUENT ENCOUNTER: ICD-10-CM

## 2021-06-16 DIAGNOSIS — M75.102 TEAR OF LEFT SUPRASPINATUS TENDON: Primary | ICD-10-CM

## 2021-06-16 PROCEDURE — 97140 MANUAL THERAPY 1/> REGIONS: CPT | Performed by: PHYSICAL THERAPIST

## 2021-06-16 PROCEDURE — 97110 THERAPEUTIC EXERCISES: CPT | Performed by: PHYSICAL THERAPIST

## 2021-06-16 NOTE — PROGRESS NOTES
Daily Note     Today's date: 2021  Patient name: Pilo Youngblood  : 1965  MRN: [id number]  Referring provider: Julianna Lopes MD  Dx:   Encounter Diagnosis     ICD-10-CM    1  Tear of left supraspinatus tendon  M75 102    2  Injury of tendon of long head of biceps, left, subsequent encounter  S46 102D                   Subjective:  Pt reports continued improvement  Minor stiffness only      Objective: See treatment diary below      Assessment: Tolerated treatment well  Patient demonstrated fatigue post treatment, exhibited good technique with therapeutic exercises and would benefit from continued PT       Plan: Continue per plan of care        Precautions: protocol      Manuals 6/2 6/4 6/7 6/10 6/14 6/16       PROM L shoulder within protocol 10 min 10 min 10 min 10 min 10 min 10 min       annaton L extensor muscle mass   5 min np                                   Neuro Re-Ed                                                                                                        Ther Ex 6/2 6/4 6/7 6/10 6/14 6/16       Cervical AROM x10 x10 x15 x15 x15 x15       Wrist AROM x10 x10 x15 x15 x15 x15       scap squeezes nv 5"x15 5"x20 5"x20 5"x20 5"x20       pendulums x10 x10 x10 x10 x10 x10       digiflex nv Red 5"x20 Red 5"x20 Red 5"x20 Red 5"x20 Red 5"x20                                              Ther Activity                                       Gait Training                                       Modalities             CP post np

## 2021-06-21 ENCOUNTER — OFFICE VISIT (OUTPATIENT)
Dept: PHYSICAL THERAPY | Facility: MEDICAL CENTER | Age: 56
End: 2021-06-21
Payer: OTHER MISCELLANEOUS

## 2021-06-21 DIAGNOSIS — M75.102 TEAR OF LEFT SUPRASPINATUS TENDON: Primary | ICD-10-CM

## 2021-06-21 DIAGNOSIS — S46.102D INJURY OF TENDON OF LONG HEAD OF BICEPS, LEFT, SUBSEQUENT ENCOUNTER: ICD-10-CM

## 2021-06-21 PROCEDURE — 97110 THERAPEUTIC EXERCISES: CPT | Performed by: PHYSICAL THERAPIST

## 2021-06-21 PROCEDURE — 97140 MANUAL THERAPY 1/> REGIONS: CPT | Performed by: PHYSICAL THERAPIST

## 2021-06-21 NOTE — PROGRESS NOTES
Daily Note     Today's date: 2021  Patient name: Zhen Macario  : 1965  MRN: [id number]  Referring provider: Nancy Felix MD  Dx:   Encounter Diagnosis     ICD-10-CM    1  Tear of left supraspinatus tendon  M75 102    2  Injury of tendon of long head of biceps, left, subsequent encounter  S46 102D                   Subjective:  Pt reports some soreness over the weekend  Today shoulder is feeling pretty good  Objective: See treatment diary below      Assessment: Tolerated treatment well  Patient demonstrated fatigue post treatment, exhibited good technique with therapeutic exercises and would benefit from continued PT   PROM remains within protocol limits  Plan: Continue per plan of care        Precautions: protocol      Manuals 6/2 6/4 6/7 6/10 6/14 6/16 6/21      PROM L shoulder within protocol 10 min 10 min 10 min 10 min 10 min 10 min 10 min      graston L extensor muscle mass   5 min np                                   Neuro Re-Ed                                                                                                        Ther Ex 6/2 6/4 6/7 6/10 6/14 6/16 6/21      Cervical AROM x10 x10 x15 x15 x15 x15 x15      Wrist AROM x10 x10 x15 x15 x15 x15 x15      scap squeezes nv 5"x15 5"x20 5"x20 5"x20 5"x20 5"x20      pendulums x10 x10 x10 x10 x10 x10 x10      digiflex nv Red 5"x20 Red 5"x20 Red 5"x20 Red 5"x20 Red 5"x20 red5"x20                                             Ther Activity                                       Gait Training                                       Modalities 6/            CP post np

## 2021-06-24 ENCOUNTER — OFFICE VISIT (OUTPATIENT)
Dept: PHYSICAL THERAPY | Facility: MEDICAL CENTER | Age: 56
End: 2021-06-24
Payer: OTHER MISCELLANEOUS

## 2021-06-24 DIAGNOSIS — S46.102D INJURY OF TENDON OF LONG HEAD OF BICEPS, LEFT, SUBSEQUENT ENCOUNTER: ICD-10-CM

## 2021-06-24 DIAGNOSIS — M75.102 TEAR OF LEFT SUPRASPINATUS TENDON: Primary | ICD-10-CM

## 2021-06-24 PROCEDURE — 97140 MANUAL THERAPY 1/> REGIONS: CPT | Performed by: PHYSICAL THERAPIST

## 2021-06-24 PROCEDURE — 97110 THERAPEUTIC EXERCISES: CPT | Performed by: PHYSICAL THERAPIST

## 2021-06-24 NOTE — PROGRESS NOTES
Daily Note     Today's date: 2021  Patient name: Linda Gonzalez  : 1965  MRN: [id number]  Referring provider: Giselle Lau MD  Dx:   Encounter Diagnosis     ICD-10-CM    1  Tear of left supraspinatus tendon  M75 102    2  Injury of tendon of long head of biceps, left, subsequent encounter  S46 102D                   Subjective:  Pt reports most pain persisting at night  Objective: See treatment diary below      Assessment: Tolerated treatment well  Patient demonstrated fatigue post treatment, exhibited good technique with therapeutic exercises and would benefit from continued PT  Progressing well w/ PROM per protocol  Plan: Continue per plan of care        Precautions: protocol      Manuals 6/2 6/4 6/7 6/10 6/14 6/16 6/21 6/24     PROM L shoulder within protocol 10 min 10 min 10 min 10 min 10 min 10 min 10 min 10 min     graston L extensor muscle mass   5 min np                                   Neuro Re-Ed                                                                                                        Ther Ex 6/2 6/4 6/7 6/10 6/14 6/16 6/21 6/24     Cervical AROM x10 x10 x15 x15 x15 x15 x15 x15     Wrist AROM x10 x10 x15 x15 x15 x15 x15 x15     scap squeezes nv 5"x15 5"x20 5"x20 5"x20 5"x20 5"x20 5"x20     pendulums x10 x10 x10 x10 x10 x10 x10 np     digiflex nv Red 5"x20 Red 5"x20 Red 5"x20 Red 5"x20 Red 5"x20 red5"x20 green 5"x20     Table slides flex and scap        nv                               Ther Activity                                       Gait Training                                       Modalities 6/            CP post np

## 2021-06-28 ENCOUNTER — OFFICE VISIT (OUTPATIENT)
Dept: PHYSICAL THERAPY | Facility: MEDICAL CENTER | Age: 56
End: 2021-06-28
Payer: OTHER MISCELLANEOUS

## 2021-06-28 DIAGNOSIS — S46.102D INJURY OF TENDON OF LONG HEAD OF BICEPS, LEFT, SUBSEQUENT ENCOUNTER: ICD-10-CM

## 2021-06-28 DIAGNOSIS — M75.102 TEAR OF LEFT SUPRASPINATUS TENDON: Primary | ICD-10-CM

## 2021-06-28 PROCEDURE — 97140 MANUAL THERAPY 1/> REGIONS: CPT | Performed by: PHYSICAL THERAPIST

## 2021-06-28 PROCEDURE — 97110 THERAPEUTIC EXERCISES: CPT | Performed by: PHYSICAL THERAPIST

## 2021-06-28 NOTE — PROGRESS NOTES
Daily Note     Today's date: 2021  Patient name: Pedro Tinajero  : 1965  MRN: [id number]  Referring provider: An Oliver MD  Dx:   Encounter Diagnosis     ICD-10-CM    1  Tear of left supraspinatus tendon  M75 102    2  Injury of tendon of long head of biceps, left, subsequent encounter  S46 102D                   Subjective:  Pt reports some increased pain w/ initiating AAROM  Also reports some mild parasthesias at anterior shoulder      Objective: See treatment diary below      Assessment: Tolerated treatment well  Patient demonstrated fatigue post treatment, exhibited good technique with therapeutic exercises and would benefit from continued PT  Added table slides today w/ good tolerance     Plan: Continue per plan of care        Precautions: protocol      Manuals 6/2 6/4 6/7 6/10 6/14 6/16 6/21 6/24 6/28    PROM L shoulder within protocol 10 min 10 min 10 min 10 min 10 min 10 min 10 min 10 min 10 min    graston L extensor muscle mass   5 min np                                   Neuro Re-Ed                                                                                                        Ther Ex 6/2 6/4 6/7 6/10 6/14 6/16 6/21 6/24 6/28    Cervical AROM x10 x10 x15 x15 x15 x15 x15 x15 np    Wrist AROM x10 x10 x15 x15 x15 x15 x15 x15 np    scap squeezes nv 5"x15 5"x20 5"x20 5"x20 5"x20 5"x20 5"x20 5"x20    pendulums x10 x10 x10 x10 x10 x10 x10 np np    digiflex nv Red 5"x20 Red 5"x20 Red 5"x20 Red 5"x20 Red 5"x20 red5"x20 green 5"x20 np    Table slides flex, scap, ER        nv 10" x10 ea                              Ther Activity                                       Gait Training                                       Modalities /            CP post np

## 2021-07-01 ENCOUNTER — OFFICE VISIT (OUTPATIENT)
Dept: PHYSICAL THERAPY | Facility: MEDICAL CENTER | Age: 56
End: 2021-07-01
Payer: OTHER MISCELLANEOUS

## 2021-07-01 DIAGNOSIS — M75.102 TEAR OF LEFT SUPRASPINATUS TENDON: Primary | ICD-10-CM

## 2021-07-01 DIAGNOSIS — S46.102D INJURY OF TENDON OF LONG HEAD OF BICEPS, LEFT, SUBSEQUENT ENCOUNTER: ICD-10-CM

## 2021-07-01 PROCEDURE — 97110 THERAPEUTIC EXERCISES: CPT | Performed by: PHYSICAL THERAPIST

## 2021-07-01 PROCEDURE — 97140 MANUAL THERAPY 1/> REGIONS: CPT | Performed by: PHYSICAL THERAPIST

## 2021-07-01 NOTE — PROGRESS NOTES
Daily Note     Today's date: 2021  Patient name: Tamika Chacon  : 1965  MRN: [id number]  Referring provider: Dionte Pinedo MD  Dx:   Encounter Diagnosis     ICD-10-CM    1  Tear of left supraspinatus tendon  M75 102    2  Injury of tendon of long head of biceps, left, subsequent encounter  S46 102D                   Subjective:  Pt anxious to discontinue use of sling next week  At times feels no pain; other times achiness increases  Objective: See treatment diary below      Assessment: Tolerated treatment well  Patient demonstrated fatigue post treatment, exhibited good technique with therapeutic exercises and would benefit from continued PT  Less pain w/ table slides today  PROM continues to improve  Plan: Continue per plan of care        Precautions: protocol      Manuals 6/2 6/4 6/7 6/10 6/14 6/16 6/21 6/24 6/28 7   PROM L shoulder within protocol 10 min 10 min 10 min 10 min 10 min 10 min 10 min 10 min 10 min 10 min   graston L extensor muscle mass   5 min np                                   Neuro Re-Ed                                                                                                        Ther Ex 6/2 6/4 6/7 6/10 6/14 6/16 6/21 6/24 6/28 7   Cervical AROM x10 x10 x15 x15 x15 x15 x15 x15 np    Wrist AROM x10 x10 x15 x15 x15 x15 x15 x15 np    scap squeezes nv 5"x15 5"x20 5"x20 5"x20 5"x20 5"x20 5"x20 5"x20 5"x20   pendulums x10 x10 x10 x10 x10 x10 x10 np np    digiflex nv Red 5"x20 Red 5"x20 Red 5"x20 Red 5"x20 Red 5"x20 red5"x20 green 5"x20 np    Table slides flex, scap, ER        nv 10" x10 ea 10" x10 ea                             Ther Activity                                       Gait Training                                       Modalities 6/            CP post np

## 2021-07-06 ENCOUNTER — TELEPHONE (OUTPATIENT)
Dept: GASTROENTEROLOGY | Facility: AMBULARY SURGERY CENTER | Age: 56
End: 2021-07-06

## 2021-07-06 ENCOUNTER — OFFICE VISIT (OUTPATIENT)
Dept: PHYSICAL THERAPY | Facility: MEDICAL CENTER | Age: 56
End: 2021-07-06
Payer: OTHER MISCELLANEOUS

## 2021-07-06 DIAGNOSIS — M75.102 TEAR OF LEFT SUPRASPINATUS TENDON: Primary | ICD-10-CM

## 2021-07-06 DIAGNOSIS — S46.102D INJURY OF TENDON OF LONG HEAD OF BICEPS, LEFT, SUBSEQUENT ENCOUNTER: ICD-10-CM

## 2021-07-06 PROCEDURE — 97110 THERAPEUTIC EXERCISES: CPT | Performed by: PHYSICAL THERAPIST

## 2021-07-06 PROCEDURE — 97140 MANUAL THERAPY 1/> REGIONS: CPT | Performed by: PHYSICAL THERAPIST

## 2021-07-06 NOTE — TELEPHONE ENCOUNTER
Spoke to pt  Pt states he always sees dr Binh Acosta for his colonoscopy   Pt states he will call  dr gray's office to schedule colonoscopy

## 2021-07-06 NOTE — PROGRESS NOTES
Daily Note     Today's date: 2021  Patient name: Mckenna Toledo  : 1965  MRN: [id number]  Referring provider: Reynaldo Alcaraz MD  Dx:   Encounter Diagnosis     ICD-10-CM    1  Tear of left supraspinatus tendon  M75 102    2  Injury of tendon of long head of biceps, left, subsequent encounter  S46 102D                   Subjective:  Pt reports increased soreness in anterior shoulder region the past few days  Objective: See treatment diary below      Assessment: Tolerated treatment well  Patient demonstrated fatigue post treatment, exhibited good technique with therapeutic exercises and would benefit from continued PT  Added pulleys w/ good tolerance today  Plan: Continue per plan of care        Precautions: protocol      Manuals    PROM L shoulder within protocol 15 min         10 min                                          Neuro Re-Ed                                                                                                        Ther Ex    pulleys 5 min                         scap squeezes 5"x 20         5"x20                             Table slides flex, scap, ER 10" x10 ea         10" x10 ea                             Ther Activity                                       Gait Training                                       Modalities             CP post np

## 2021-07-06 NOTE — TELEPHONE ENCOUNTER
Spoke to pt  Pt states he always sees dr Camille Valdovinos for his colonoscopy  Pt will call their office to schedule  I sent this message to Dr Maverick Silva as well   IAIN

## 2021-07-09 ENCOUNTER — OFFICE VISIT (OUTPATIENT)
Dept: PHYSICAL THERAPY | Facility: MEDICAL CENTER | Age: 56
End: 2021-07-09
Payer: OTHER MISCELLANEOUS

## 2021-07-09 DIAGNOSIS — S46.102D INJURY OF TENDON OF LONG HEAD OF BICEPS, LEFT, SUBSEQUENT ENCOUNTER: ICD-10-CM

## 2021-07-09 DIAGNOSIS — M75.102 TEAR OF LEFT SUPRASPINATUS TENDON: Primary | ICD-10-CM

## 2021-07-09 PROCEDURE — 97140 MANUAL THERAPY 1/> REGIONS: CPT | Performed by: PHYSICAL THERAPIST

## 2021-07-09 PROCEDURE — 97110 THERAPEUTIC EXERCISES: CPT | Performed by: PHYSICAL THERAPIST

## 2021-07-09 NOTE — PROGRESS NOTES
Daily Note     Today's date: 2021  Patient name: Monserrat Tan  : 1965  MRN: [id number]  Referring provider: Brock Childress MD  Dx:   Encounter Diagnosis     ICD-10-CM    1  Tear of left supraspinatus tendon  M75 102    2  Injury of tendon of long head of biceps, left, subsequent encounter  S46 102D                   Subjective:  Pt felt good after last visit  Advised to dc sling today per protocol  Objective: See treatment diary below      Assessment: Tolerated treatment well  Patient demonstrated fatigue post treatment, exhibited good technique with therapeutic exercises and would benefit from continued PT  Tolerated treatment progressions very well  Reviewed protocol; advised to use arm as able, but to avoid lifting anything greater than 2 pounds  Plan: Continue per plan of care        Precautions: protocol      Manuals    PROM L shoulder within protocol 15 min 15 min        10 min                                          Neuro Re-Ed                                                                                                        Ther Ex    pulleys 5 min 5 min                        scap squeezes 5"x 20 5"x20        5"x20   Supine wand flex, scap, ER  10" x10 ea                        Table slides flex, scap, ER 10" x10 ea 10" x10 ea        10" x10 ea                             Ther Activity                                       Gait Training                                       Modalities             CP post np

## 2021-07-12 ENCOUNTER — OFFICE VISIT (OUTPATIENT)
Dept: PHYSICAL THERAPY | Facility: MEDICAL CENTER | Age: 56
End: 2021-07-12
Payer: OTHER MISCELLANEOUS

## 2021-07-12 DIAGNOSIS — M75.102 TEAR OF LEFT SUPRASPINATUS TENDON: Primary | ICD-10-CM

## 2021-07-12 DIAGNOSIS — S46.102D INJURY OF TENDON OF LONG HEAD OF BICEPS, LEFT, SUBSEQUENT ENCOUNTER: ICD-10-CM

## 2021-07-12 PROCEDURE — 97140 MANUAL THERAPY 1/> REGIONS: CPT | Performed by: PHYSICAL THERAPIST

## 2021-07-12 PROCEDURE — 97110 THERAPEUTIC EXERCISES: CPT | Performed by: PHYSICAL THERAPIST

## 2021-07-12 NOTE — PROGRESS NOTES
Daily Note     Today's date: 2021  Patient name: Roque Almodovar  : 1965  MRN: [id number]  Referring provider: Katelynn Reeves MD  Dx:   Encounter Diagnosis     ICD-10-CM    1  Tear of left supraspinatus tendon  M75 102    2  Injury of tendon of long head of biceps, left, subsequent encounter  S46 102D                   Subjective:  Pt continues to remain guarded w/ placement of LUE  Reports feeling fluctuations of pain t/o the day  Objective: See treatment diary below      Assessment: Tolerated treatment well  Patient demonstrated fatigue post treatment, exhibited good technique with therapeutic exercises and would benefit from continued PT  No significant pain w/ program today  Gradually improving ROM in all planes  Plan: Continue per plan of care        Precautions: protocol      Manuals    PROM L shoulder within protocol 15 min 15 min 15 min       10 min                                          Neuro Re-Ed                                                                                                        Ther Ex    pulleys 5 min 5 min 5 min                       scap squeezes 5"x 20 5"x20 5"x20       5"x20   Supine wand flex, scap, ER  10" x10 ea 10" x10 ea                       Table slides flex, scap, ER 10" x10 ea 10" x10 ea 10" x10       10" x10 ea                             Ther Activity                                       Gait Training                                       Modalities             CP post np

## 2021-07-15 ENCOUNTER — EVALUATION (OUTPATIENT)
Dept: PHYSICAL THERAPY | Facility: MEDICAL CENTER | Age: 56
End: 2021-07-15
Payer: OTHER MISCELLANEOUS

## 2021-07-15 DIAGNOSIS — S46.102D INJURY OF TENDON OF LONG HEAD OF BICEPS, LEFT, SUBSEQUENT ENCOUNTER: ICD-10-CM

## 2021-07-15 DIAGNOSIS — M75.102 TEAR OF LEFT SUPRASPINATUS TENDON: Primary | ICD-10-CM

## 2021-07-15 PROCEDURE — 97140 MANUAL THERAPY 1/> REGIONS: CPT | Performed by: PHYSICAL THERAPIST

## 2021-07-15 PROCEDURE — 97110 THERAPEUTIC EXERCISES: CPT | Performed by: PHYSICAL THERAPIST

## 2021-07-19 ENCOUNTER — OFFICE VISIT (OUTPATIENT)
Dept: PHYSICAL THERAPY | Facility: MEDICAL CENTER | Age: 56
End: 2021-07-19
Payer: OTHER MISCELLANEOUS

## 2021-07-19 DIAGNOSIS — S46.102D INJURY OF TENDON OF LONG HEAD OF BICEPS, LEFT, SUBSEQUENT ENCOUNTER: ICD-10-CM

## 2021-07-19 DIAGNOSIS — M75.102 TEAR OF LEFT SUPRASPINATUS TENDON: Primary | ICD-10-CM

## 2021-07-19 PROCEDURE — 97140 MANUAL THERAPY 1/> REGIONS: CPT | Performed by: PHYSICAL THERAPIST

## 2021-07-19 PROCEDURE — 97110 THERAPEUTIC EXERCISES: CPT | Performed by: PHYSICAL THERAPIST

## 2021-07-19 NOTE — PROGRESS NOTES
Daily Note     Today's date: 2021  Patient name: Roque Almodovar  : 1965  MRN: [id number]  Referring provider: Yumiko Mcclure*  Dx:   Encounter Diagnosis     ICD-10-CM    1  Tear of left supraspinatus tendon  M75 102    2  Injury of tendon of long head of biceps, left, subsequent encounter  S46 102D                   Subjective: Pt reports feeling really good yesterday; almost having no pain  Objective: See treatment diary below      Assessment: Tolerated treatment well  Patient demonstrated fatigue post treatment, exhibited good technique with therapeutic exercises and would benefit from continued PT  No c/o pain w/ treatment today  Plan: Continue per plan of care        Precautions: protocol      Manuals 7/15 7/19           PROM L shoulder within protocol 10 min 10 min                                                  Neuro Re-Ed                                                                                                        Ther Ex 7/15 7/19           pulleys 5 min 5 min           scap squeezes 5"x20 np           Table slides flex, scap, ER 10" x10 ea 10" x10           Supine wand flex, scap, ER 10" x10 ea 10" x10           Standing wand IR and ext 10' x10 10" x10                                                  Ther Activity                                       Gait Training                                       Modalities             CP post np

## 2021-07-22 ENCOUNTER — OFFICE VISIT (OUTPATIENT)
Dept: PHYSICAL THERAPY | Facility: MEDICAL CENTER | Age: 56
End: 2021-07-22
Payer: OTHER MISCELLANEOUS

## 2021-07-22 DIAGNOSIS — S46.102D INJURY OF TENDON OF LONG HEAD OF BICEPS, LEFT, SUBSEQUENT ENCOUNTER: ICD-10-CM

## 2021-07-22 DIAGNOSIS — M75.102 TEAR OF LEFT SUPRASPINATUS TENDON: Primary | ICD-10-CM

## 2021-07-22 PROCEDURE — 97110 THERAPEUTIC EXERCISES: CPT | Performed by: PHYSICAL THERAPIST

## 2021-07-22 PROCEDURE — 97140 MANUAL THERAPY 1/> REGIONS: CPT | Performed by: PHYSICAL THERAPIST

## 2021-07-22 NOTE — PROGRESS NOTES
Daily Note     Today's date: 2021  Patient name: Cody Aguilar  : 1965  MRN: [id number]  Referring provider: Sol Andres*  Dx:   Encounter Diagnosis     ICD-10-CM    1  Tear of left supraspinatus tendon  M75 102    2  Injury of tendon of long head of biceps, left, subsequent encounter  S46 102D                   Subjective: Pt feeling ok  Progressing well w/ motion  Objective: See treatment diary below      Assessment: Tolerated treatment well  Patient demonstrated fatigue post treatment, exhibited good technique with therapeutic exercises and would benefit from continued PT  ROM improving in all planes; w/ less pain at end ranges  Plan: Continue per plan of care        Precautions: protocol      Manuals 7/15 7/19 7/22          PROM L shoulder within protocol 10 min 10 min 10 min                                                 Neuro Re-Ed                                                                                                        Ther Ex 7/15 7/19 7/22          pulleys 5 min 5 min 5 min          scap squeezes 5"x20 np np          Table slides flex, scap, ER 10" x10 ea 10" x10 hep          Supine wand flex, scap, ER 10" x10 ea 10" x10 10" x10          Standing wand IR and ext 10' x10 10" x10 10" x10          Wall slides   10" x10                                    Ther Activity                                       Gait Training                                       Modalities             CP post np

## 2021-07-26 ENCOUNTER — APPOINTMENT (OUTPATIENT)
Dept: PHYSICAL THERAPY | Facility: MEDICAL CENTER | Age: 56
End: 2021-07-26
Payer: OTHER MISCELLANEOUS

## 2021-07-29 ENCOUNTER — OFFICE VISIT (OUTPATIENT)
Dept: PHYSICAL THERAPY | Facility: MEDICAL CENTER | Age: 56
End: 2021-07-29
Payer: OTHER MISCELLANEOUS

## 2021-07-29 DIAGNOSIS — M75.102 TEAR OF LEFT SUPRASPINATUS TENDON: Primary | ICD-10-CM

## 2021-07-29 DIAGNOSIS — S46.102D INJURY OF TENDON OF LONG HEAD OF BICEPS, LEFT, SUBSEQUENT ENCOUNTER: ICD-10-CM

## 2021-07-29 PROCEDURE — 97110 THERAPEUTIC EXERCISES: CPT | Performed by: PHYSICAL MEDICINE & REHABILITATION

## 2021-07-29 PROCEDURE — 97140 MANUAL THERAPY 1/> REGIONS: CPT | Performed by: PHYSICAL MEDICINE & REHABILITATION

## 2021-07-29 PROCEDURE — 97112 NEUROMUSCULAR REEDUCATION: CPT | Performed by: PHYSICAL MEDICINE & REHABILITATION

## 2021-07-29 NOTE — PROGRESS NOTES
Daily Note     Today's date: 2021  Patient name: Sridhar Canales  : 1965  MRN: [id number]  Referring provider: Taco Guevara*  Dx:   Encounter Diagnosis     ICD-10-CM    1  Tear of left supraspinatus tendon  M75 102    2  Injury of tendon of long head of biceps, left, subsequent encounter  S46 102D                   Subjective: Patient notes some tightness/soreness over the weekend, feels well today  Objective: See treatment diary below      Assessment: Tolerated treatment well  Most challenged with [facility] ER and IR, good range with PROM without pain  Patient demonstrated fatigue post treatment, exhibited good technique with therapeutic exercises and would benefit from continued PT  Continue as able per protocol  Plan: Continue per plan of care        Precautions: protocol    Manuals 7/15 7/19 7/22 7/29         PROM L shoulder within protocol 10 min 10 min 10 min LH                                                Neuro Re-Ed                                                                                                        Ther Ex 7/15 7/19 7/22 7/29         pulleys 5 min 5 min 5 min 5'         scap squeezes 5"x20 np np np         Table slides flex, scap, ER 10" x10 ea 10" x10 hep ---         Supine wand flex, scap, ER 10" x10 ea 10" x10 10" x10 10x10" ea         Standing wand IR and ext 10' x10 10" x10 10" x10 10x10" ea         Wall slides   10" x10 10x10"                                   Ther Activity                                       Gait Training                                       Modalities             CP post np

## 2021-08-02 ENCOUNTER — OFFICE VISIT (OUTPATIENT)
Dept: OBGYN CLINIC | Facility: OTHER | Age: 56
End: 2021-08-02

## 2021-08-02 VITALS
HEIGHT: 68 IN | DIASTOLIC BLOOD PRESSURE: 76 MMHG | SYSTOLIC BLOOD PRESSURE: 142 MMHG | WEIGHT: 164 LBS | BODY MASS INDEX: 24.86 KG/M2 | HEART RATE: 67 BPM

## 2021-08-02 DIAGNOSIS — Z47.89 AFTERCARE FOLLOWING SURGERY OF THE MUSCULOSKELETAL SYSTEM: Primary | ICD-10-CM

## 2021-08-02 PROCEDURE — 99024 POSTOP FOLLOW-UP VISIT: CPT | Performed by: PHYSICIAN ASSISTANT

## 2021-08-02 NOTE — PROGRESS NOTES
Assessment:       1  Aftercare following surgery of the musculoskeletal system          Plan:        Patient is doing well postoperatively  And making progress with PT  All questions were addressed to the patient's satisfaction  Patient Will continue with rehab protocol  He return to work note has been placed in his chart  Follow-up will be in 2 months to assess patient's progress  Subjective:     Patient ID: Eliana Abraham is a 60 y o  male  Chief Complaint:    HPI    Patient presents to the office for follow-up status post left shoulder arthroscopy with rotator cuff repair on 05/26/2021  He is making progress with PT and denies any new concerns at this time  Social History     Occupational History    Occupation:    Tobacco Use    Smoking status: Former Smoker    Smokeless tobacco: Never Used   Vaping Use    Vaping Use: Never used   Substance and Sexual Activity    Alcohol use: Yes     Comment: Social    Drug use: Yes     Types: Marijuana    Sexual activity: Yes     Partners: Female      Review of Systems   Constitutional: Negative  Respiratory: Negative  Musculoskeletal: Positive for myalgias  Negative for arthralgias  Skin: Negative for wound  Neurological: Positive for weakness  Negative for numbness  Psychiatric/Behavioral: Negative  Objective:     Ortho ExamPhysical Exam  HENT:      Head: Atraumatic  Cardiovascular:      Pulses: Normal pulses  Pulmonary:      Effort: Pulmonary effort is normal    Musculoskeletal:      Comments: Range of motion left shoulder: Forward flexion 120°, external rotation 90°  Skin:     General: Skin is warm and dry  Capillary Refill: Capillary refill takes less than 2 seconds  Comments: Surgical incisions dry and clean, healed  Neurological:      Mental Status: He is alert and oriented to person, place, and time  Sensory: No sensory deficit     Psychiatric:         Mood and Affect: Mood normal  Behavior: Behavior normal

## 2021-08-02 NOTE — LETTER
August 2, 2021     Patient: Shaan Mitchell   YOB: 1965   Date of Visit: 8/2/2021       To Whom it May Concern:    Shaan Mitchell is under my professional care  He had left shoulder arthroscopy with rotator cuff repair, biceps tenodesis and extensive debridement on 05/26/2021  He was seen in my office on 8/2/2021  He may return to work on 08/09/2021 and may return to work with limitations Right hand work only  No lifting, pulling, or pushing with left arm  Computer and desk work is okay  If these accommodations cannot be met, then patient is to remain out of work until his next evaluation in approximately 2 months  If you have any questions or concerns, please don't hesitate to call           Sincerely,          Alise Ware PA-C        CC: Shaan Mitchell

## 2021-08-04 ENCOUNTER — OFFICE VISIT (OUTPATIENT)
Dept: PHYSICAL THERAPY | Facility: MEDICAL CENTER | Age: 56
End: 2021-08-04
Payer: OTHER MISCELLANEOUS

## 2021-08-04 DIAGNOSIS — S46.102D INJURY OF TENDON OF LONG HEAD OF BICEPS, LEFT, SUBSEQUENT ENCOUNTER: ICD-10-CM

## 2021-08-04 DIAGNOSIS — M75.102 TEAR OF LEFT SUPRASPINATUS TENDON: Primary | ICD-10-CM

## 2021-08-04 PROCEDURE — 97112 NEUROMUSCULAR REEDUCATION: CPT | Performed by: PHYSICAL THERAPIST

## 2021-08-04 PROCEDURE — 97140 MANUAL THERAPY 1/> REGIONS: CPT | Performed by: PHYSICAL THERAPIST

## 2021-08-04 PROCEDURE — 97110 THERAPEUTIC EXERCISES: CPT | Performed by: PHYSICAL THERAPIST

## 2021-08-04 NOTE — PROGRESS NOTES
Daily Note     Today's date: 2021  Patient name: Elza Pennington  : 1965  MRN: [id number]  Referring provider: Dakotah Travis*  Dx:   Encounter Diagnosis     ICD-10-CM    1  Tear of left supraspinatus tendon  M75 102    2  Injury of tendon of long head of biceps, left, subsequent encounter  S46 102D                   Subjective: Pt did have ortho f/u; advised to continue w/ protocol  He continues to progress well and offers no new complaints today  Objective: See treatment diary below      Assessment: Tolerated treatment well  Patient demonstrated fatigue post treatment, exhibited good technique with therapeutic exercises and would benefit from continued PT  Progressed program as charted w/ very good tolerance  PROM gradually improving in all planes  Plan: Continue per plan of care        Precautions: protocol    Manuals 7/15 7/19 7/22 7/29 8        PROM L shoulder within protocol 10 min 10 min 10 min LH 10 min                                               Neuro Re-Ed     8        Supine active flex     x20        sidelying active abd     x20        Shoulder isometric 4 way     5"x10 ea                                                            Ther Ex 7/15 7/19 7/22 7/29 8/4        pulleys 5 min 5 min 5 min 5' 5 min        scap squeezes 5"x20 np np np         Table slides flex, scap, ER 10" x10 ea 10" x10 hep ---         Supine wand flex, scap, ER 10" x10 ea 10" x10 10" x10 10x10" ea 10" x10 ea        Standing wand IR and ext 10' x10 10" x10 10" x10 10x10" ea 10" x10        Wall slides   10" x10 10x10" 10" x10                                  Ther Activity                                       Gait Training                                       Modalities             CP post np

## 2021-08-06 ENCOUNTER — OFFICE VISIT (OUTPATIENT)
Dept: PHYSICAL THERAPY | Facility: MEDICAL CENTER | Age: 56
End: 2021-08-06
Payer: OTHER MISCELLANEOUS

## 2021-08-06 DIAGNOSIS — S46.102D INJURY OF TENDON OF LONG HEAD OF BICEPS, LEFT, SUBSEQUENT ENCOUNTER: ICD-10-CM

## 2021-08-06 DIAGNOSIS — M75.102 TEAR OF LEFT SUPRASPINATUS TENDON: Primary | ICD-10-CM

## 2021-08-06 PROCEDURE — 97110 THERAPEUTIC EXERCISES: CPT | Performed by: PHYSICAL THERAPIST

## 2021-08-06 PROCEDURE — 97140 MANUAL THERAPY 1/> REGIONS: CPT | Performed by: PHYSICAL THERAPIST

## 2021-08-06 PROCEDURE — 97112 NEUROMUSCULAR REEDUCATION: CPT | Performed by: PHYSICAL THERAPIST

## 2021-08-06 NOTE — PROGRESS NOTES
Daily Note     Today's date: 2021  Patient name: Derick Horvath  : 1965  MRN: [id number]  Referring provider: Enmanuel Petit*  Dx:   Encounter Diagnosis     ICD-10-CM    1  Tear of left supraspinatus tendon  M75 102    2  Injury of tendon of long head of biceps, left, subsequent encounter  S46 102D                   Subjective: Pt reports some soreness following progressions last visit  Objective: See treatment diary below      Assessment: Tolerated treatment well  Patient demonstrated fatigue post treatment, exhibited good technique with therapeutic exercises and would benefit from continued PT  Progressing very well w/ ROM in all planes  Plan: Continue per plan of care        Precautions: protocol    Manuals 7/15 7/19 7/22 7/29 8/4 8/6       PROM L shoulder within protocol 10 min 10 min 10 min LH 10 min 10 min                                              Neuro Re-Ed            Supine active flex     x20 x20       sidelying active abd     x20 x20       Shoulder isometric 4 way     5"x10 ea 5"x10 ea                                                           Ther Ex 7/15 7/19 7/22 7/29 8/4 8/6       pulleys 5 min 5 min 5 min 5' 5 min 5 min       scap squeezes 5"x20 np np np         Table slides flex, scap, ER 10" x10 ea 10" x10 hep ---         Supine wand flex, scap, ER 10" x10 ea 10" x10 10" x10 10x10" ea 10" x10 ea 10" x10       Standing wand IR and ext 10' x10 10" x10 10" x10 10x10" ea 10" x10 10" x10       Wall slides   10" x10 10x10" 10" x10 10" x10                                 Ther Activity                                       Gait Training                                       Modalities             CP post np

## 2021-08-09 ENCOUNTER — OFFICE VISIT (OUTPATIENT)
Dept: PHYSICAL THERAPY | Facility: MEDICAL CENTER | Age: 56
End: 2021-08-09
Payer: OTHER MISCELLANEOUS

## 2021-08-09 DIAGNOSIS — M75.102 TEAR OF LEFT SUPRASPINATUS TENDON: Primary | ICD-10-CM

## 2021-08-09 DIAGNOSIS — S46.102D INJURY OF TENDON OF LONG HEAD OF BICEPS, LEFT, SUBSEQUENT ENCOUNTER: ICD-10-CM

## 2021-08-09 PROCEDURE — 97140 MANUAL THERAPY 1/> REGIONS: CPT | Performed by: PHYSICAL THERAPIST

## 2021-08-09 PROCEDURE — 97112 NEUROMUSCULAR REEDUCATION: CPT | Performed by: PHYSICAL THERAPIST

## 2021-08-09 PROCEDURE — 97110 THERAPEUTIC EXERCISES: CPT | Performed by: PHYSICAL THERAPIST

## 2021-08-09 NOTE — PROGRESS NOTES
Daily Note     Today's date: 2021  Patient name: Arabella Padron  : 1965  MRN: [id number]  Referring provider: Risa Mccain*  Dx:   Encounter Diagnosis     ICD-10-CM    1  Tear of left supraspinatus tendon  M75 102    2  Injury of tendon of long head of biceps, left, subsequent encounter  S46 102D                   Subjective:  Pt offers no new complaints  Objective: See treatment diary below      Assessment: Tolerated treatment well  Patient demonstrated fatigue post treatment, exhibited good technique with therapeutic exercises and would benefit from continued PT  No c/o pain offered t/o treatment  Plan: Continue per plan of care        Precautions: protocol    Manuals 7/15 7/19 7/22 7/29 8/4 8/6 8/9      PROM L shoulder within protocol 10 min 10 min 10 min LH 10 min 10 min 10 min                                             Neuro Re-Ed           Supine active flex     x20 x20 x20      sidelying active abd     x20 x20 x20      Shoulder isometric 4 way     5"x10 ea 5"x10 ea 5"x10 ea                                                          Ther Ex 7/15 7/19 7/22 7/29 8/4 8/6 8/9      pulleys 5 min 5 min 5 min 5' 5 min 5 min 5 min      scap squeezes 5"x20 np np np         Table slides flex, scap, ER 10" x10 ea 10" x10 hep ---         Supine wand flex, scap, ER 10" x10 ea 10" x10 10" x10 10x10" ea 10" x10 ea 10" x10 10" x10      Standing wand IR and ext 10' x10 10" x10 10" x10 10x10" ea 10" x10 10" x10 np      Wall slides   10" x10 10x10" 10" x10 10" x10 10" x10                                Ther Activity                                       Gait Training                                       Modalities             CP post np

## 2021-08-12 ENCOUNTER — OFFICE VISIT (OUTPATIENT)
Dept: PHYSICAL THERAPY | Facility: MEDICAL CENTER | Age: 56
End: 2021-08-12
Payer: OTHER MISCELLANEOUS

## 2021-08-12 DIAGNOSIS — S46.102D INJURY OF TENDON OF LONG HEAD OF BICEPS, LEFT, SUBSEQUENT ENCOUNTER: ICD-10-CM

## 2021-08-12 DIAGNOSIS — M75.102 TEAR OF LEFT SUPRASPINATUS TENDON: Primary | ICD-10-CM

## 2021-08-12 PROCEDURE — 97110 THERAPEUTIC EXERCISES: CPT | Performed by: PHYSICAL THERAPIST

## 2021-08-12 PROCEDURE — 97140 MANUAL THERAPY 1/> REGIONS: CPT | Performed by: PHYSICAL THERAPIST

## 2021-08-12 PROCEDURE — 97112 NEUROMUSCULAR REEDUCATION: CPT | Performed by: PHYSICAL THERAPIST

## 2021-08-12 NOTE — PROGRESS NOTES
Daily Note     Today's date: 2021  Patient name: Sridhar Canales  : 1965  MRN: [id number]  Referring provider: Taco Guevara*  Dx:   Encounter Diagnosis     ICD-10-CM    1  Tear of left supraspinatus tendon  M75 102    2  Injury of tendon of long head of biceps, left, subsequent encounter  S46 102D                   Subjective:  Pt was able to shampoo rug w/out increased pain  Objective: See treatment diary below      Assessment: Tolerated treatment well  Patient demonstrated fatigue post treatment, exhibited good technique with therapeutic exercises and would benefit from continued PT  Pt continues to progress very well  Plan: Continue per plan of care        Precautions: protocol    Manuals 7/15 7/19 7/22 7/29 8/4 8/6 8/9 8/12     PROM L shoulder within protocol 10 min 10 min 10 min LH 10 min 10 min 10 min 10 min                                            Neuro Re-Ed          Supine active flex     x20 x20 x20 x20     sidelying active abd     x20 x20 x20 x20     Shoulder isometric 4 way     5"x10 ea 5"x10 ea 5"x10 ea 5"x10 ea                                                         Ther Ex 7/15 7/19 7/22 7/29 8/4 8/6 8/9 8/12     pulleys 5 min 5 min 5 min 5' 5 min 5 min 5 min 5 min     scap squeezes 5"x20 np np np         Table slides flex, scap, ER 10" x10 ea 10" x10 hep ---         Supine wand flex, scap, ER 10" x10 ea 10" x10 10" x10 10x10" ea 10" x10 ea 10" x10 10" x10 10" x10     Standing wand IR and ext 10' x10 10" x10 10" x10 10x10" ea 10" x10 10" x10 np 10" x10     Wall slides   10" x10 10x10" 10" x10 10" x10 10" x10 10" x10                               Ther Activity                                       Gait Training                                       Modalities             CP post np

## 2021-08-16 ENCOUNTER — OFFICE VISIT (OUTPATIENT)
Dept: PHYSICAL THERAPY | Facility: MEDICAL CENTER | Age: 56
End: 2021-08-16
Payer: OTHER MISCELLANEOUS

## 2021-08-16 DIAGNOSIS — M75.102 TEAR OF LEFT SUPRASPINATUS TENDON: Primary | ICD-10-CM

## 2021-08-16 DIAGNOSIS — S46.102D INJURY OF TENDON OF LONG HEAD OF BICEPS, LEFT, SUBSEQUENT ENCOUNTER: ICD-10-CM

## 2021-08-16 PROCEDURE — 97112 NEUROMUSCULAR REEDUCATION: CPT | Performed by: PHYSICAL THERAPIST

## 2021-08-16 PROCEDURE — 97140 MANUAL THERAPY 1/> REGIONS: CPT | Performed by: PHYSICAL THERAPIST

## 2021-08-16 PROCEDURE — 97110 THERAPEUTIC EXERCISES: CPT | Performed by: PHYSICAL THERAPIST

## 2021-08-16 NOTE — PROGRESS NOTES
Daily Note     Today's date: 2021  Patient name: Eliana Abraham  : 1965  MRN: [id number]  Referring provider: Caroline Walker*  Dx:   Encounter Diagnosis     ICD-10-CM    1  Tear of left supraspinatus tendon  M75 102    2  Injury of tendon of long head of biceps, left, subsequent encounter  S46 102D                   Subjective:  Pt is progressing very well  No reports of pain prior to treatment  Objective: See treatment diary below      Assessment: Tolerated treatment well  Patient demonstrated fatigue post treatment, exhibited good technique with therapeutic exercises and would benefit from continued PT  Plan to initiate strengthening later this week per protocol  Plan: Continue per plan of care        Precautions: protocol    Manuals 7/15 7/19 7/22 7/29 8/4 8/6 8/9 8/12 8/16    PROM L shoulder within protocol 10 min 10 min 10 min LH 10 min 10 min 10 min 10 min 10 min                                           Neuro Re-Ed         Supine active flex     x20 x20 x20 x20 x20    sidelying active abd     x20 x20 x20 x20 x20    Shoulder isometric 4 way     5"x10 ea 5"x10 ea 5"x10 ea 5"x10 ea 5"x10 ea                                                        Ther Ex 7/15 7/19 7/22 7/29 8/4 8/6 8/9 8/12 8/16    pulleys 5 min 5 min 5 min 5' 5 min 5 min 5 min 5 min 5 min    scap squeezes 5"x20 np np np         Table slides flex, scap, ER 10" x10 ea 10" x10 hep ---         Supine wand flex, scap, ER 10" x10 ea 10" x10 10" x10 10x10" ea 10" x10 ea 10" x10 10" x10 10" x10 10" x10    Standing wand IR and ext 10' x10 10" x10 10" x10 10x10" ea 10" x10 10" x10 np 10" x10 10" x10    Wall slides   10" x10 10x10" 10" x10 10" x10 10" x10 10" x10 10" x10                              Ther Activity                                       Gait Training                                       Modalities             CP post np

## 2021-08-19 ENCOUNTER — OFFICE VISIT (OUTPATIENT)
Dept: PHYSICAL THERAPY | Facility: MEDICAL CENTER | Age: 56
End: 2021-08-19
Payer: OTHER MISCELLANEOUS

## 2021-08-19 DIAGNOSIS — S46.102D INJURY OF TENDON OF LONG HEAD OF BICEPS, LEFT, SUBSEQUENT ENCOUNTER: ICD-10-CM

## 2021-08-19 DIAGNOSIS — M75.102 TEAR OF LEFT SUPRASPINATUS TENDON: Primary | ICD-10-CM

## 2021-08-19 PROCEDURE — 97140 MANUAL THERAPY 1/> REGIONS: CPT | Performed by: PHYSICAL THERAPIST

## 2021-08-19 PROCEDURE — 97110 THERAPEUTIC EXERCISES: CPT | Performed by: PHYSICAL THERAPIST

## 2021-08-19 PROCEDURE — 97112 NEUROMUSCULAR REEDUCATION: CPT | Performed by: PHYSICAL THERAPIST

## 2021-08-19 NOTE — PROGRESS NOTES
Daily Note     Today's date: 2021  Patient name: Jennifer Roman  : 1965  MRN: [id number]  Referring provider: James Urrutia*  Dx:   Encounter Diagnosis     ICD-10-CM    1  Tear of left supraspinatus tendon  M75 102    2  Injury of tendon of long head of biceps, left, subsequent encounter  S46 102D                   Subjective:  Pt offers no new complaints today  Inquired about cutting grass  Reviewed protocol w/ patient; entering next phase which states 10# lifting limit w/ avoidance of sudden pushing or lifting  Advised to hold until week 16  Objective: See treatment diary below      Assessment: Tolerated treatment well  Patient demonstrated fatigue post treatment, exhibited good technique with therapeutic exercises and would benefit from continued PT  Progressed w/ Tband rows and extensions w/ good tolerance  Plan: Continue per plan of care        Precautions: protocol    Manuals 7/15 7/19 7/22 7/29 8/4 8/6 8/9 8/12 8/16 8/19   PROM L shoulder within protocol 10 min 10 min 10 min LH 10 min 10 min 10 min 10 min 10 min 10 min                                          Neuro Re-Ed        Supine active flex     x20 x20 x20 x20 x20 x20   sidelying active abd     x20 x20 x20 x20 x20 x20   Shoulder isometric 4 way     5"x10 ea 5"x10 ea 5"x10 ea 5"x10 ea 5"x10 ea 5"x10 ea   TB rows          RTB5" x20   TB ext          RTB 5"x20                             Ther Ex 7/15 7/19 7/22 7/29 8 8   pulleys 5 min 5 min 5 min 5' 5 min 5 min 5 min 5 min 5 min 5 min   scap squeezes 5"x20 np np np         Table slides flex, scap, ER 10" x10 ea 10" x10 hep ---         Supine wand flex, scap, ER 10" x10 ea 10" x10 10" x10 10x10" ea 10" x10 ea 10" x10 10" x10 10" x10 10" x10 10" x10   Standing wand IR and ext 10' x10 10" x10 10" x10 10x10" ea 10" x10 10" x10 np 10" x10 10" x10 10" x10   Wall slides   10" x10 10x10" 10" x10 10" x10 10" x10 10" x10 10" x10 10" x10                             Ther Activity                                       Gait Training                                       Modalities             CP post np

## 2021-08-23 ENCOUNTER — OFFICE VISIT (OUTPATIENT)
Dept: PHYSICAL THERAPY | Facility: MEDICAL CENTER | Age: 56
End: 2021-08-23
Payer: OTHER MISCELLANEOUS

## 2021-08-23 DIAGNOSIS — S46.102D INJURY OF TENDON OF LONG HEAD OF BICEPS, LEFT, SUBSEQUENT ENCOUNTER: ICD-10-CM

## 2021-08-23 DIAGNOSIS — M75.102 TEAR OF LEFT SUPRASPINATUS TENDON: Primary | ICD-10-CM

## 2021-08-23 PROCEDURE — 97140 MANUAL THERAPY 1/> REGIONS: CPT | Performed by: PHYSICAL THERAPIST

## 2021-08-23 PROCEDURE — 97110 THERAPEUTIC EXERCISES: CPT | Performed by: PHYSICAL THERAPIST

## 2021-08-23 PROCEDURE — 97112 NEUROMUSCULAR REEDUCATION: CPT | Performed by: PHYSICAL THERAPIST

## 2021-08-23 NOTE — PROGRESS NOTES
Daily Note     Today's date: 2021  Patient name: Eliana Abraham  : 1965  MRN: [id number]  Referring provider: Caroline Walker*  Dx:   Encounter Diagnosis     ICD-10-CM    1  Tear of left supraspinatus tendon  M75 102    2  Injury of tendon of long head of biceps, left, subsequent encounter  S46 102D                   Subjective:  Pt reports feeling really good  Objective: See treatment diary below      Assessment: Tolerated treatment well  Patient demonstrated fatigue post treatment, exhibited good technique with therapeutic exercises and would benefit from continued PT  Tolerated strength progressions very well  Plan: Continue per plan of care        Precautions: protocol    Manuals    PROM L shoulder within protocol 10 min         10 min                                          Neuro Re-Ed    Supine active flex x20         x20   sidelying active abd x20         x20   Shoulder isometric 4 way 5"x10         5"x10 ea   TB rows GTB 5"x20         RTB5" x20   TB ext GTB 5"x20         RTB 5"x20   sidelying ER x20            Prone rows and ext x20 ea                                                                TherEx    pulleys 5 min            Wall slides 10" x10         10" x10                             Ther Activity                                       Gait Training                                       Modalities             CP post np

## 2021-08-26 ENCOUNTER — EVALUATION (OUTPATIENT)
Dept: PHYSICAL THERAPY | Facility: MEDICAL CENTER | Age: 56
End: 2021-08-26
Payer: OTHER MISCELLANEOUS

## 2021-08-26 DIAGNOSIS — M75.102 TEAR OF LEFT SUPRASPINATUS TENDON: Primary | ICD-10-CM

## 2021-08-26 DIAGNOSIS — S46.102D INJURY OF TENDON OF LONG HEAD OF BICEPS, LEFT, SUBSEQUENT ENCOUNTER: ICD-10-CM

## 2021-08-26 PROCEDURE — 97112 NEUROMUSCULAR REEDUCATION: CPT | Performed by: PHYSICAL THERAPIST

## 2021-08-26 PROCEDURE — 97140 MANUAL THERAPY 1/> REGIONS: CPT | Performed by: PHYSICAL THERAPIST

## 2021-08-26 NOTE — PROGRESS NOTES
PT Re-Evaluation     Today's date: 2021  Patient name: Arabella Padron  : 1965  MRN: [id number]  Referring provider: Silvia Loja*  Dx:   Encounter Diagnosis     ICD-10-CM    1  Tear of left supraspinatus tendon  M75 102    2  Injury of tendon of long head of biceps, left, subsequent encounter  S46 102D                   Assessment  Assessment details: Arabella Padron has attended 25 visits since initiating PT and has demonstrated overall improvement  Mobility and strength has improved with decreased reports of pain  Arabella Padron has demonstrated an improvement in function as well  Currently, he has made steady progress towards his goals, but continue to remain limited with extreme reaching into end ranges and heavy lifting per protocol  At this time, continued physical therapy is recommended in order to address their remaining impairments in effort to further improve function  Impairments: abnormal or restricted ROM, abnormal movement, activity intolerance, impaired physical strength, lacks appropriate home exercise program and pain with function  Understanding of Dx/Px/POC: excellent  Goals  Short Term Goals:  1  Pain decreased 3 subjective ratings in 6 weeks MET  2  PROM within normal limits in 6 weeks PARTIALLY MET  3  Full pain free cervical, wrist, and elbow ROM in 6 weeks MET  4  Strength increased 1 grade in 4 weeks (new goal)    Long Term Goals:  1  AROM within normal limits in 12 weeks PARTIALLY MET  2  Patient will be independent with IADLS/ADLS  MET  3  Independent with HEP PARTIALLY MET  4   Patient will lift maximal weight within protocol limits (new goal(    Plan  Patient would benefit from: skilled physical therapy  Planned modality interventions: cryotherapy  Planned therapy interventions: therapeutic exercise, stretching, strengthening, patient education, neuromuscular re-education, manual therapy, home exercise program, functional ROM exercises and flexibility  Frequency: 2x week  Duration in weeks: 8  Treatment plan discussed with: patient        Subjective Evaluation    History of Present Illness  Mechanism of injury: Pt reports continued improvement  Has progressed very well w/ protocol and initiation of strengthening  Very mild intermittent pain w/ reaching into extreme end ranges or lifting overhead  He does report compliance w/ maintaining 10# lifting limit  No reports of sleep disturbance due to pain    Pain  At best pain ratin  At worst pain ratin    Patient Goals  Patient goals for therapy: decreased pain, increased motion, increased strength, independence with ADLs/IADLs and return to work          Objective     Active Range of Motion   Cervical/Thoracic Spine     Normal active range of motion  Left Shoulder   Flexion: 150 degrees   Abduction: 158 degrees     Passive Range of Motion   Left Shoulder   Flexion: 170 degrees   Abduction: 75 degrees   External rotation 45°: 70 degrees   Internal rotation 45°: 55 degrees     Right Shoulder   Normal passive range of motion    Left Elbow   Normal passive range of motion    Left Wrist   Normal passive range of motion    Strength/Myotome Testing     Left Shoulder     Planes of Motion   Flexion: 4-   Abduction: 4-   External rotation at 90°: 4-   Internal rotation at 90°: 4-              Precautions: protocol      Manuals             PROM L shoulder  10 min                                                   Neuro Re-Ed             Supine flex x20            sidelying abd x20            sidelying ER x20            Prone shoulder ext x20            Prone rows x20            Prone horizontal abd x20            TB rows GTB 5"x20            TB ext GTB 5"x20            TB ER/IR GTB x20 ea                                                                                                       Ther Activity                                       Gait Training Modalities             CP post np

## 2021-08-30 ENCOUNTER — OFFICE VISIT (OUTPATIENT)
Dept: PHYSICAL THERAPY | Facility: MEDICAL CENTER | Age: 56
End: 2021-08-30
Payer: OTHER MISCELLANEOUS

## 2021-08-30 DIAGNOSIS — S46.102D INJURY OF TENDON OF LONG HEAD OF BICEPS, LEFT, SUBSEQUENT ENCOUNTER: ICD-10-CM

## 2021-08-30 DIAGNOSIS — M75.102 TEAR OF LEFT SUPRASPINATUS TENDON: Primary | ICD-10-CM

## 2021-08-30 PROCEDURE — 97112 NEUROMUSCULAR REEDUCATION: CPT | Performed by: PHYSICAL THERAPIST

## 2021-08-30 PROCEDURE — 97140 MANUAL THERAPY 1/> REGIONS: CPT | Performed by: PHYSICAL THERAPIST

## 2021-08-30 NOTE — PROGRESS NOTES
Daily Note     Today's date: 2021  Patient name: Rehan German  : 1965  MRN: [id number]  Referring provider: Brianda Mendez*  Dx:   Encounter Diagnosis     ICD-10-CM    1  Tear of left supraspinatus tendon  M75 102    2  Injury of tendon of long head of biceps, left, subsequent encounter  S46 102D                   Subjective: Pt continues to progress very well  Objective: See treatment diary below      Assessment: Tolerated treatment well  Patient demonstrated fatigue post treatment, exhibited good technique with therapeutic exercises and would benefit from continued PT  Progressing very well w/ strengthening  Plan: Continue per plan of care        Precautions: protocol      Manuals            PROM L shoulder  10 min 10 min                                                  Neuro Re-Ed            Supine flex x20 x20           sidelying abd x20 x20           sidelying ER x20 x20           Prone shoulder ext x20 x20           Prone rows x20 x20           Prone horizontal abd x20 x20           TB rows GTB 5"x20 GTB 5"x20           TB ext GTB 5"x20 GTB 5"x20           TB ER/IR GTB x20 ea GTB x20           Serratus punches  x20           Seated active flex   x20                                                                            Ther Ex            pulleys 5 min 5 min                        Gait Training                                       Modalities             CP post np

## 2021-09-02 ENCOUNTER — OFFICE VISIT (OUTPATIENT)
Dept: PHYSICAL THERAPY | Facility: MEDICAL CENTER | Age: 56
End: 2021-09-02
Payer: OTHER MISCELLANEOUS

## 2021-09-02 DIAGNOSIS — M75.102 TEAR OF LEFT SUPRASPINATUS TENDON: Primary | ICD-10-CM

## 2021-09-02 DIAGNOSIS — S46.102D INJURY OF TENDON OF LONG HEAD OF BICEPS, LEFT, SUBSEQUENT ENCOUNTER: ICD-10-CM

## 2021-09-02 PROCEDURE — 97140 MANUAL THERAPY 1/> REGIONS: CPT | Performed by: PHYSICAL THERAPIST

## 2021-09-02 PROCEDURE — 97112 NEUROMUSCULAR REEDUCATION: CPT | Performed by: PHYSICAL THERAPIST

## 2021-09-02 NOTE — PROGRESS NOTES
Daily Note     Today's date: 2021  Patient name: Roque Almodovar  : 1965  MRN: [id number]  Referring provider: Jayda Cortez*  Dx:   Encounter Diagnosis     ICD-10-CM    1  Tear of left supraspinatus tendon  M75 102    2  Injury of tendon of long head of biceps, left, subsequent encounter  S46 102D                   Subjective: Pt offers no c/o pain prior to treatment today  Objective: See treatment diary below      Assessment: Tolerated treatment well  Patient demonstrated fatigue post treatment, exhibited good technique with therapeutic exercises and would benefit from continued PT  Added weight as charted w/ good tolerance  Mild tightness persisting w/ ER; added doorway stretch  Plan: Continue per plan of care        Precautions: protocol      Manuals  9          PROM L shoulder  10 min 10 min 10 min                                                 Neuro Re-Ed  9/2          Supine flex x20 x20 2# x20          sidelying abd x20 x20 2# x20          sidelying ER x20 x20 2# x20          Prone shoulder ext x20 x20 2# x20          Prone rows x20 x20 2# x20          Prone horizontal abd x20 x20 2# x20          TB rows GTB 5"x20 GTB 5"x20 GTB 5"x20          TB ext GTB 5"x20 GTB 5"x20 GTB 5"x20          TB ER/IR GTB x20 ea GTB x20 GTB x20          Serratus punches  x20 2# x20          Seated active flex   x20 2# x20                                                                           Ther Ex  9          pulleys 5 min 5 min 5 min                       Gait Training                                       Modalities             CP post np

## 2021-09-07 ENCOUNTER — OFFICE VISIT (OUTPATIENT)
Dept: PHYSICAL THERAPY | Facility: MEDICAL CENTER | Age: 56
End: 2021-09-07
Payer: OTHER MISCELLANEOUS

## 2021-09-07 DIAGNOSIS — M75.102 TEAR OF LEFT SUPRASPINATUS TENDON: Primary | ICD-10-CM

## 2021-09-07 DIAGNOSIS — S46.102D INJURY OF TENDON OF LONG HEAD OF BICEPS, LEFT, SUBSEQUENT ENCOUNTER: ICD-10-CM

## 2021-09-07 PROCEDURE — 97140 MANUAL THERAPY 1/> REGIONS: CPT

## 2021-09-07 PROCEDURE — 97112 NEUROMUSCULAR REEDUCATION: CPT

## 2021-09-07 NOTE — PROGRESS NOTES
Daily Note     Today's date: 2021  Patient name: Monserrat Tan  : 1965  MRN: [id number]  Referring provider: Eh Alejo*  Dx:   Encounter Diagnosis     ICD-10-CM    1  Tear of left supraspinatus tendon  M75 102    2  Injury of tendon of long head of biceps, left, subsequent encounter  S46 102D                   Subjective: Pt reports that he is feeling a little stiff and notes that he has no pain in the shoulder  Objective: See treatment diary below      Assessment: Tolerated treatment well  Patient with good mobility in L shoulder  Pt does well with there ex and is making good progress to date  Pt would benefit from continued therapy for strengthening for improving functional mobility  Plan: Continue per plan of care  Precautions: protocol      Manuals          PROM L shoulder  10 min 10 min 10 min 10 min                                                   Neuro Re-Ed          Supine flex x20 x20 2# x20 2# 20x         sidelying abd x20 x20 2# x20 2# 20x         sidelying ER x20 x20 2# x20 2# 20x         Prone shoulder ext x20 x20 2# x20 2# 20x         Prone rows x20 x20 2# x20 2# 20x         Prone horizontal abd x20 x20 2# x20 2# 20x         TB rows GTB 5"x20 GTB 5"x20 GTB 5"x20 GTB 5"x20         TB ext GTB 5"x20 GTB 5"x20 GTB 5"x20 GTB 5"x20         TB ER/IR GTB x20 ea GTB x20 GTB x20 GTB x20         Serratus punches  x20 2# x20 2# 20x         Seated active flex   x20 2# x20 2# 20x                                                                          Ther Ex          pulleys 5 min 5 min 5 min 5 min                      Gait Training                                       Modalities             CP post np

## 2021-09-08 ENCOUNTER — ANESTHESIA EVENT (OUTPATIENT)
Dept: GASTROENTEROLOGY | Facility: AMBULATORY SURGERY CENTER | Age: 56
End: 2021-09-08

## 2021-09-08 ENCOUNTER — TELEPHONE (OUTPATIENT)
Dept: FAMILY MEDICINE CLINIC | Facility: MEDICAL CENTER | Age: 56
End: 2021-09-08

## 2021-09-08 ENCOUNTER — ANESTHESIA (OUTPATIENT)
Dept: GASTROENTEROLOGY | Facility: AMBULATORY SURGERY CENTER | Age: 56
End: 2021-09-08

## 2021-09-08 ENCOUNTER — HOSPITAL ENCOUNTER (OUTPATIENT)
Dept: GASTROENTEROLOGY | Facility: AMBULATORY SURGERY CENTER | Age: 56
Discharge: HOME | End: 2021-09-08
Payer: COMMERCIAL

## 2021-09-08 VITALS
DIASTOLIC BLOOD PRESSURE: 85 MMHG | TEMPERATURE: 96 F | HEART RATE: 80 BPM | WEIGHT: 160 LBS | RESPIRATION RATE: 18 BRPM | OXYGEN SATURATION: 98 % | HEIGHT: 68 IN | BODY MASS INDEX: 24.25 KG/M2 | SYSTOLIC BLOOD PRESSURE: 127 MMHG

## 2021-09-08 DIAGNOSIS — K51.919 ULCERATIVE COLITIS WITH COMPLICATION, UNSPECIFIED LOCATION (HCC): ICD-10-CM

## 2021-09-08 DIAGNOSIS — R09.81 CONGESTION OF NASAL SINUS: Primary | ICD-10-CM

## 2021-09-08 DIAGNOSIS — H57.89 EYE IRRITATION: ICD-10-CM

## 2021-09-08 DIAGNOSIS — Z86.010 HX OF COLONIC POLYPS: ICD-10-CM

## 2021-09-08 PROCEDURE — 45385 COLONOSCOPY W/LESION REMOVAL: CPT | Performed by: INTERNAL MEDICINE

## 2021-09-08 PROCEDURE — 45380 COLONOSCOPY AND BIOPSY: CPT | Performed by: INTERNAL MEDICINE

## 2021-09-08 PROCEDURE — 00811 ANES LWR INTST NDSC NOS: CPT | Performed by: NURSE ANESTHETIST, CERTIFIED REGISTERED

## 2021-09-08 RX ORDER — LIDOCAINE HYDROCHLORIDE 10 MG/ML
INJECTION, SOLUTION EPIDURAL; INFILTRATION; INTRACAUDAL; PERINEURAL AS NEEDED
Status: DISCONTINUED | OUTPATIENT
Start: 2021-09-08 | End: 2021-09-08

## 2021-09-08 RX ORDER — PROPOFOL 10 MG/ML
INJECTION, EMULSION INTRAVENOUS AS NEEDED
Status: DISCONTINUED | OUTPATIENT
Start: 2021-09-08 | End: 2021-09-08

## 2021-09-08 RX ORDER — SODIUM CHLORIDE 9 MG/ML
20 INJECTION, SOLUTION INTRAVENOUS CONTINUOUS
Status: DISCONTINUED | OUTPATIENT
Start: 2021-09-08 | End: 2021-09-12 | Stop reason: HOSPADM

## 2021-09-08 RX ORDER — SODIUM CHLORIDE 9 MG/ML
INJECTION, SOLUTION INTRAVENOUS CONTINUOUS PRN
Status: DISCONTINUED | OUTPATIENT
Start: 2021-09-08 | End: 2021-09-08

## 2021-09-08 RX ORDER — SODIUM CHLORIDE 9 MG/ML
30 INJECTION, SOLUTION INTRAVENOUS CONTINUOUS
Status: DISCONTINUED | OUTPATIENT
Start: 2021-09-08 | End: 2021-09-12 | Stop reason: HOSPADM

## 2021-09-08 RX ADMIN — PROPOFOL 50 MG: 10 INJECTION, EMULSION INTRAVENOUS at 08:55

## 2021-09-08 RX ADMIN — PROPOFOL 50 MG: 10 INJECTION, EMULSION INTRAVENOUS at 08:59

## 2021-09-08 RX ADMIN — PROPOFOL 50 MG: 10 INJECTION, EMULSION INTRAVENOUS at 09:03

## 2021-09-08 RX ADMIN — LIDOCAINE HYDROCHLORIDE 50 MG: 10 INJECTION, SOLUTION EPIDURAL; INFILTRATION; INTRACAUDAL; PERINEURAL at 08:55

## 2021-09-08 RX ADMIN — PROPOFOL 50 MG: 10 INJECTION, EMULSION INTRAVENOUS at 09:09

## 2021-09-08 RX ADMIN — SODIUM CHLORIDE: 9 INJECTION, SOLUTION INTRAVENOUS at 08:53

## 2021-09-08 RX ADMIN — PROPOFOL 50 MG: 10 INJECTION, EMULSION INTRAVENOUS at 08:56

## 2021-09-08 NOTE — TELEPHONE ENCOUNTER
Wife called, pt is currently having a colonoscopy, and could not sleep last night due to he has sinus congestion, and his eyes are red and irritated, he is having trouble closing them  Wife said he does not have covid due to he had covid in the past and had the shot  She also said the hospital did the screening questions and they were negative, and proceeded to take him for his colonoscopy  I explained office protocol and put in an order for a covid test   Wife aware of instructions and will speak with the pt once he is out from his colonoscopy

## 2021-09-08 NOTE — INTERVAL H&P NOTE
H&P reviewed  After examining the patient I find no changes in the patients condition since the H&P had been written      Vitals:    09/08/21 0800   BP: 135/91   Pulse: 71   Resp: 18   Temp: (!) 96 °F (35 6 °C)   SpO2: 98%

## 2021-09-08 NOTE — ANESTHESIA POSTPROCEDURE EVALUATION
Post-Op Assessment Note    CV Status:  Stable  Pain Score: 0    Pain management: adequate     Mental Status:  Sleepy   Hydration Status:  Stable   PONV Controlled:  None   Airway Patency:  Patent      Post Op Vitals Reviewed: Yes      Staff: CRNA         No complications documented      /86 (09/08/21 0919)    Temp     Pulse 72 (09/08/21 0919)   Resp 16 (09/08/21 0919)    SpO2 100 % (09/08/21 0919)

## 2021-09-08 NOTE — DISCHARGE INSTRUCTIONS
Colonoscopy   WHAT YOU NEED TO KNOW:   A colonoscopy is a procedure to examine the inside of your colon (intestine) with a scope  Polyps or tissue growths may have been removed during your colonoscopy  It is normal to feel bloated and to have some abdominal discomfort  You should be passing gas  If you have hemorrhoids or you had polyps removed, you may have a small amount of bleeding  DISCHARGE INSTRUCTIONS:   Seek care immediately if:    You have sudden, severe abdominal pain   You have problems swallowing   You have a large amount of black, sticky bowel movements or blood in your bowel movements   You have sudden trouble breathing   You feel weak, lightheaded, or faint or your heart beats faster than normal for you  Contact your healthcare provider if:    You have a fever and chills   You have nausea or are vomiting   Your abdomen is bloated or feels full and hard   You have abdominal pain   You have black, sticky bowel movements or blood in your bowel movements   You have not had a bowel movement for 3 days after your procedure   You have rash or hives   You have questions or concerns about your procedure  Activity:    Do not lift, strain, or run for 24 hours after your procedure   Rest after your procedure  You have been given medicine to relax you  Do not drive or make important decisions until the day after your procedure  Return to your normal activity as directed   Relieve gas and discomfort from bloating by lying on your right side with a heating pad on your abdomen  You may need to take short walks to help the gas move out  Eat small meals until bloating is relieved  Follow up with your healthcare provider as directed: Write down your questions so you remember to ask them during your visits  If you take a blood thinner, please review the specific instructions from your endoscopist about when you should resume it   These can be found in the Recommendation and Your Medication list sections of this After Visit Summary  Colorectal Polyps   WHAT YOU NEED TO KNOW:   What are colorectal polyps? Colorectal polyps are small growths of tissue in the lining of the colon and rectum  Most polyps are usually benign (not cancer)  Certain types of polyps, called adenomatous polyps, may turn into cancer  What increases my risk for colorectal polyps? The exact cause of colorectal polyps is unknown  The following may increase your risk:  · Older age    · Foods high in fat and low in fiber    · Family history of polyps    · Intestinal diseases, such as Crohn disease or ulcerative colitis    · Smoking cigarettes or drinking alcohol    · Lack of physical activity, such as exercise    · Obesity    What are the signs and symptoms of colorectal polyps? · Blood in your bowel movement or bleeding from the rectum    · Change in bowel movement habits, such as diarrhea or constipation    · Abdominal pain    How are colorectal polyps diagnosed? You should have fecal blood screening 1 time each year for colorectal disease if you are older than 50 years  You should be screened earlier if you have an intestinal disease or a family history of polyps or colorectal cancer  During this screening, a sample of your bowel movement is checked for blood, which may be an early sign of colorectal polyps or cancer  You may also need any of the following tests:  · A digital rectal exam  means your healthcare provider will use a finger to check for polyps  · A barium enema  is an x-ray of the colon  A tube is put into your anus, and a liquid called barium is put through the tube  Barium is used so that healthcare providers can see your colon better on the x-ray film  · A virtual colonoscopy  is a CT scan that takes pictures of the inside of your colon and rectum   A small, flexible tube is put into your rectum and air or carbon dioxide (gas) is used to expand your colon  This lets healthcare providers clearly see your colon and any polyps on a monitor  · Colonoscopy or sigmoidoscopy  are procedures to help your healthcare provider see the inside of your colon  He or she will use a flexible tube with a small light and camera on the end  During a sigmoidoscopy, your healthcare provider will only look at rectum and lower colon  During a colonoscopy, he or she will look at the full length of your colon  A small amount of tissue may be removed from your colon for tests  How are colorectal polyps treated? Small, benign polyps may not need treatment  Your healthcare provider will check the polyp over time to make sure it is not changing  Polyps that are cancer may be removed with one of the following:  · A polypectomy  is a minimally invasive procedure to remove a polyp during a colonoscopy or sigmoidoscopy  Your healthcare provider may need to remove the polyp with a laparoscope  Laparoscopy is done by inserting a small, flexible scope into incisions made on your abdomen  · Surgery  may be needed to remove large or deep polyps that cannot be removed in a polypectomy  Tissues or lymph nodes near a polyp may also be removed  This helps stop cancer from spreading  What can I do to lower my risk for colorectal polyps? · Eat a variety of healthy foods  Healthy foods include fruit, vegetables, whole-grain breads, low-fat dairy products, beans, lean meat, and fish  Ask if you need to be on a special diet  · Maintain a healthy weight  Ask your healthcare provider what a healthy weight is for you  Ask him or her to help with a weight loss plan if you are overweight  · Exercise as directed  Begin to exercise slowly and do more as you get stronger  Talk with your healthcare provider before you start an exercise program          · Limit alcohol  Your risk for polyps increases the more you drink  · Do not smoke    Nicotine and other chemicals in cigarettes and cigars increase your risk for polyps  Ask your healthcare provider for information if you currently smoke and need help to quit  E-cigarettes or smokeless tobacco still contain nicotine  Talk to your healthcare provider before you use these products  Where can I find more information? · [address] ([facility])  [address] [place], [place] [address]  Phone: [phone]  Web Address: Laurence Mccraye  [facility] gov    Call your local emergency number (911 in the [address]) if:   · You have sudden shortness of breath  · You have a fast heart rate, fast breathing, or are too dizzy to stand up  When should I seek immediate care? · You have severe abdominal pain  · You see blood in your bowel movement  When should I call my doctor? · You have a fever  · You have chills, a cough, or feel weak and achy  · You have abdominal pain that does not go away or gets worse after you take medicine  · Your abdomen is swollen  · You are losing weight without trying  · You have questions or concerns about your condition or care  CARE AGREEMENT:   You have the right to help plan your care  Learn about your health condition and how it may be treated  Discuss treatment options with your healthcare providers to decide what care you want to receive  You always have the right to refuse treatment  The above information is an  only  It is not intended as medical advice for individual conditions or treatments  Talk to your doctor, nurse or pharmacist before following any medical regimen to see if it is safe and effective for you  © Copyright [employer] Number 2021 Information is for End User's use only and may not be sold, redistributed or otherwise used for commercial purposes   All illustrations and images included in CareNotes® are the copyrighted property of A D A M , Inc  or [employer] [facility] Fiber Diet   AMBULATORY CARE:   CRISTY high-fiber diet  includes foods that have a high amount of fiber  Fiber is the part of fruits, vegetables, and grains that is not broken down by your body  Fiber keeps your bowel movements regular  Fiber can also help lower your cholesterol level, control blood sugar in people with diabetes, and relieve constipation  Fiber can also help you control your weight because it helps you feel full faster  Most adults should eat 25 to 35 grams of fiber each day  Talk to your dietitian or healthcare provider about the amount of fiber you need  Good sources of fiber:       · Foods with at least 4 grams of fiber per serving:      ? ? to ½ cup of high-fiber cereal (check the nutrition label on the box)    ? ½ cup of blackberries or raspberries    ? 4 dried prunes    ? 1 cooked artichoke    ? ½ cup of cooked legumes, such as lentils, or red, kidney, and solis beans    · Foods with 1 to 3 grams of fiber per serving:      ? 1 slice of whole-wheat, pumpernickel, or rye bread    ? ½ cup of cooked brown rice    ? 4 whole-wheat crackers    ? 1 cup of oatmeal    ? ½ cup of cereal with 1 to 3 grams of fiber per serving (check the nutrition label on the box)    ? 1 small piece of fruit, such as an apple, banana, pear, kiwi, or orange    ? 3 dates    ? ½ cup of canned apricots, fruit cocktail, peaches, or pears    ? ½ cup of raw or cooked vegetables, such as carrots, cauliflower, cabbage, spinach, squash, or corn  Ways that you can increase fiber in your diet:   · Choose brown or wild rice instead of white rice  · Use whole wheat flour in recipes instead of white or all-purpose flour  · Add beans and peas to casseroles or soups  · Choose fresh fruit and vegetables with peels or skins on instead of juices  Other diet guidelines to follow:   · Add fiber to your diet slowly  You may have abdominal discomfort, bloating, and gas if you add fiber to your diet too quickly       · Drink plenty of liquids as you add fiber to your diet   You may have nausea or develop constipation if you do not drink enough water  Ask how much liquid to drink each day and which liquids are best for you  © Copyright [employer] 2021 Information is for End User's use only and may not be sold, redistributed or otherwise used for commercial purposes  All illustrations and images included in CareNotes® are the copyrighted property of A D A M , Inc  or [facility] Kylee Love   The above information is an  only  It is not intended as medical advice for individual conditions or treatments  Talk to your doctor, nurse or pharmacist before following any medical regimen to see if it is safe and effective for you

## 2021-09-08 NOTE — DISCHARGE SUMMARY
Discharge Summary - Kriss Pineda 60 y o  male MRN: [id number]    Unit/Bed#:  Encounter: [id number]    Admission Date:  09/08/2021    Admitting Diagnosis: Hx of colonic polyps [Z86 010]  Ulcerative colitis with complication, unspecified location [facility]) [K51 919]    HPI:  History of ulcerative colitis  Multiple colon polyps  Mostly pseudopolyps  Procedures Performed: No orders of the defined types were placed in this encounter  Summary of Hospital Course: Tolerated procedure well  Significant Findings, Care, Treatment and Services Provided:  Multiple pseudopolyps noted in the colon as explained  Biopsies taken  Complications:  None    Discharge Diagnosis:  Burnt out ulcerative colitis  Medical Problems     Resolved Problems  Date Reviewed: 8/26/2021    None                Condition at Discharge: good         Discharge instructions/Information to patient and family:   See after visit summary for information provided to patient and family  Provisions for Follow-Up Care:  See after visit summary for information related to follow-up care and any pertinent home health orders        PCP: Jose Harley MD    Disposition: Home

## 2021-09-08 NOTE — ANESTHESIA PREPROCEDURE EVALUATION
Procedure:  COLONOSCOPY    Relevant Problems   ANESTHESIA (within normal limits)      CARDIO (within normal limits)      ENDO (within normal limits)      GI/HEPATIC (within normal limits)      /RENAL (within normal limits)      GYN (within normal limits)      HEMATOLOGY (within normal limits)      MUSCULOSKELETAL (within normal limits)      NEURO/PSYCH   (+) Chronic left shoulder pain      PULMONARY (within normal limits)        Physical Exam    Airway    Mallampati score: I  TM Distance: >3 FB  Neck ROM: full     Dental   No notable dental hx     Cardiovascular  Rhythm: regular, Rate: normal, Cardiovascular exam normal    Pulmonary  Pulmonary exam normal Breath sounds clear to auscultation,     Other Findings        Anesthesia Plan  ASA Score- 1     Anesthesia Type- IV sedation with anesthesia with ASA Monitors  Additional Monitors:   Airway Plan:           Plan Factors-Exercise tolerance (METS): >4 METS  Chart reviewed  Patient is not a current smoker  Induction- intravenous  Postoperative Plan-     Informed Consent- Anesthetic plan and risks discussed with patient  Patient reports melo eye pressure started yesterday  Noticed redness in both eyes  Pt denies any allergies, runny nose, cough    Pt states his eye condition has improved since yesterday as he is able to close his eyes

## 2021-09-08 NOTE — H&P
History and Physical - [facility]  [facility] 60 y o  male MRN: [id number]                  HPI: [facility] is a 60y o  year old male who presents for screening colonoscopy  History of polyps  Patient history of ulcerative colitis for a long duration  Currently in remission      REVIEW OF SYSTEMS: Per the HPI, and otherwise unremarkable  Historical Information   Past Medical History:   Diagnosis Date    Colon polyp     History of ulcerative colitis      Past Surgical History:   Procedure Laterality Date    COLONOSCOPY      Fiberoptic    NO PAST SURGERIES      ND SHLDR ARTHROSCOP,SURG,W/ROTAT CUFF REPR Left 5/26/2021    Procedure: SHOULDER ARTHROSCOPIC ROTATOR CUFF REPAIR, BICEPS TENODESIS, EXTENSIVE DEBRIDEMENT;  Surgeon: Pasha Ovalle MD;  Location: [facility];  Service: Orthopedics     Social History   Social History     Substance and Sexual Activity   Alcohol Use Yes    Comment: Social     Social History     Substance and Sexual Activity   Drug Use Not Currently     Social History     Tobacco Use   Smoking Status Former Smoker   Smokeless Tobacco Never Used   Tobacco Comment    Quit at 26     Family History   Problem Relation Age of Onset    Lung cancer Mother     Arthritis Father     Heart disease Family         Cardiac Disorder       Meds/Allergies       Current Outpatient Medications:     Multiple Vitamin (MULTI-VITAMIN PO)    Current Facility-Administered Medications:     sodium chloride 0 9 % infusion, 30 mL/hr, Intravenous, Continuous    No Known Allergies    Objective     /91   Pulse 71   Temp (!) 96 °F (35 6 °C) (Temporal)   Resp 18   Ht 5' 8" (1 727 m)   Wt 72 6 kg (160 lb)   SpO2 98%   BMI 24 33 kg/m²       PHYSICAL EXAM    Gen: NAD  Head: NCAT  CV: RRR  CHEST: Clear  ABD: soft, NT/ND  EXT: no edema      ASSESSMENT/PLAN:  This is a 60y o  year old male here for colonoscopy, and he is stable and optimized for his procedure

## 2021-09-09 ENCOUNTER — OFFICE VISIT (OUTPATIENT)
Dept: PHYSICAL THERAPY | Facility: MEDICAL CENTER | Age: 56
End: 2021-09-09
Payer: OTHER MISCELLANEOUS

## 2021-09-09 DIAGNOSIS — M75.102 TEAR OF LEFT SUPRASPINATUS TENDON: Primary | ICD-10-CM

## 2021-09-09 DIAGNOSIS — S46.102D INJURY OF TENDON OF LONG HEAD OF BICEPS, LEFT, SUBSEQUENT ENCOUNTER: ICD-10-CM

## 2021-09-09 LAB
ALBUMIN SERPL-MCNC: 4.7 G/DL (ref 3.6–5.1)
ALBUMIN/GLOB SERPL: 1.7 (CALC) (ref 1–2.5)
ALP SERPL-CCNC: 73 U/L (ref 35–144)
ALT SERPL-CCNC: 12 U/L (ref 9–46)
AST SERPL-CCNC: 21 U/L (ref 10–35)
BASOPHILS # BLD AUTO: 10 CELLS/UL (ref 0–200)
BASOPHILS NFR BLD AUTO: 0.1 %
BILIRUB SERPL-MCNC: 1.4 MG/DL (ref 0.2–1.2)
BUN SERPL-MCNC: 17 MG/DL (ref 7–25)
BUN/CREAT SERPL: ABNORMAL (CALC) (ref 6–22)
CALCIUM SERPL-MCNC: 10.3 MG/DL (ref 8.6–10.3)
CHLORIDE SERPL-SCNC: 100 MMOL/L (ref 98–110)
CHOLEST SERPL-MCNC: 201 MG/DL
CHOLEST/HDLC SERPL: 2.5 (CALC)
CO2 SERPL-SCNC: 28 MMOL/L (ref 20–32)
CREAT SERPL-MCNC: 0.79 MG/DL (ref 0.7–1.33)
EOSINOPHIL # BLD AUTO: 0 CELLS/UL (ref 15–500)
EOSINOPHIL NFR BLD AUTO: 0 %
ERYTHROCYTE [DISTWIDTH] IN BLOOD BY AUTOMATED COUNT: 13.1 % (ref 11–15)
GLOBULIN SER CALC-MCNC: 2.8 G/DL (CALC) (ref 1.9–3.7)
GLUCOSE SERPL-MCNC: 99 MG/DL (ref 65–99)
HCT VFR BLD AUTO: 46.8 % (ref 38.5–50)
HDLC SERPL-MCNC: 79 MG/DL
HGB BLD-MCNC: 15.4 G/DL (ref 13.2–17.1)
LDLC SERPL CALC-MCNC: 107 MG/DL (CALC)
LYMPHOCYTES # BLD AUTO: 554 CELLS/UL (ref 850–3900)
LYMPHOCYTES NFR BLD AUTO: 5.6 %
MCH RBC QN AUTO: 30 PG (ref 27–33)
MCHC RBC AUTO-ENTMCNC: 32.9 G/DL (ref 32–36)
MCV RBC AUTO: 91.2 FL (ref 80–100)
MONOCYTES # BLD AUTO: 158 CELLS/UL (ref 200–950)
MONOCYTES NFR BLD AUTO: 1.6 %
NEUTROPHILS # BLD AUTO: 9177 CELLS/UL (ref 1500–7800)
NEUTROPHILS NFR BLD AUTO: 92.7 %
NONHDLC SERPL-MCNC: 122 MG/DL (CALC)
PLATELET # BLD AUTO: 284 THOUSAND/UL (ref 140–400)
PMV BLD REES-ECKER: 11.3 FL (ref 7.5–12.5)
POTASSIUM SERPL-SCNC: 4.3 MMOL/L (ref 3.5–5.3)
PROT SERPL-MCNC: 7.5 G/DL (ref 6.1–8.1)
PSA SERPL-MCNC: 0.6 NG/ML
RBC # BLD AUTO: 5.13 MILLION/UL (ref 4.2–5.8)
SL AMB EGFR AFRICAN AMERICAN: 115 ML/MIN/1.73M2
SL AMB EGFR NON AFRICAN AMERICAN: 99 ML/MIN/1.73M2
SODIUM SERPL-SCNC: 136 MMOL/L (ref 135–146)
TRIGL SERPL-MCNC: 66 MG/DL
TSH SERPL-ACNC: 0.36 MIU/L (ref 0.4–4.5)
WBC # BLD AUTO: 9.9 THOUSAND/UL (ref 3.8–10.8)

## 2021-09-09 PROCEDURE — 97112 NEUROMUSCULAR REEDUCATION: CPT | Performed by: PHYSICAL MEDICINE & REHABILITATION

## 2021-09-09 PROCEDURE — 97110 THERAPEUTIC EXERCISES: CPT | Performed by: PHYSICAL MEDICINE & REHABILITATION

## 2021-09-09 PROCEDURE — 97530 THERAPEUTIC ACTIVITIES: CPT | Performed by: PHYSICAL MEDICINE & REHABILITATION

## 2021-09-09 NOTE — PROGRESS NOTES
Daily Note     Today's date: 2021  Patient name: Mckenna Toledo  : 1965  MRN: [id number]  Referring provider: William Britton*  Dx:   Encounter Diagnosis     ICD-10-CM    1  Tear of left supraspinatus tendon  M75 102    2  Injury of tendon of long head of biceps, left, subsequent encounter  S46 102D                   Subjective: Pt reports that he is feeling a little stiff and notes that he has no pain in the shoulder  Objective: See treatment diary below      Assessment: Tolerated treatment well  Challenged with progressions/new activity without pain provocation  Continue to progress as able with functional strengthening  Plan: Continue per plan of care  Precautions: protocol      Manuals         PROM L shoulder  10 min 10 min 10 min 10 min    [facility]                                               Neuro Re-Ed         Supine flex x20 x20 2# x20 2# 20x 3# 2x10        sidelying abd x20 x20 2# x20 2# 20x 3# 2x10        sidelying ER x20 x20 2# x20 2# 20x         Prone shoulder ext x20 x20 2# x20 2# 20x 3# 2x10        Prone rows x20 x20 2# x20 2# 20x         Prone horizontal abd x20 x20 2# x20 2# 20x 3# 2x10        TB rows GTB 5"x20 GTB 5"x20 GTB 5"x20 GTB 5"x20 BTB 2x10        TB ext GTB 5"x20 GTB 5"x20 GTB 5"x20 GTB 5"x20 BTB 2x10        TB ER/IR GTB x20 ea GTB x20 GTB x20 GTB x20         Serratus punches  x20 2# x20 2# 20x         Seated active flex   x20 2# x20 2# 20x              Shelf tap 3#, 15x             Rhythmic stab 3x                                               Ther Ex         pulleys 5 min 5 min 5 min 5 min 5'                     Gait Training                                       Modalities             CP post np

## 2021-09-13 ENCOUNTER — OFFICE VISIT (OUTPATIENT)
Dept: PHYSICAL THERAPY | Facility: MEDICAL CENTER | Age: 56
End: 2021-09-13
Payer: OTHER MISCELLANEOUS

## 2021-09-13 DIAGNOSIS — M75.102 TEAR OF LEFT SUPRASPINATUS TENDON: Primary | ICD-10-CM

## 2021-09-13 DIAGNOSIS — S46.102D INJURY OF TENDON OF LONG HEAD OF BICEPS, LEFT, SUBSEQUENT ENCOUNTER: ICD-10-CM

## 2021-09-13 PROCEDURE — 97112 NEUROMUSCULAR REEDUCATION: CPT | Performed by: PHYSICAL THERAPIST

## 2021-09-13 PROCEDURE — 97140 MANUAL THERAPY 1/> REGIONS: CPT | Performed by: PHYSICAL THERAPIST

## 2021-09-13 NOTE — PROGRESS NOTES
Daily Note     Today's date: 2021  Patient name: Margrette Osgood  : 1965  MRN: [id number]  Referring provider: Santiago James*  Dx:   Encounter Diagnosis     ICD-10-CM    1  Tear of left supraspinatus tendon  M75 102    2  Injury of tendon of long head of biceps, left, subsequent encounter  S46 102D                   Subjective: Pt reports some increased discomfort and soreness in superior aspect of shoulder after Tuesday visit last week  No significant pain  Objective: See treatment diary below      Assessment: Tolerated treatment well  Continue to progress as able with functional strengthening  He Is challenged w/ strength progressions  Plan: Continue per plan of care  Precautions: protocol      Manuals        PROM L shoulder  10 min 10 min 10 min 10 min    LH 10 min                                              Neuro Re-Ed        Supine flex x20 x20 2# x20 2# 20x 3# 2x10 3# x20       sidelying abd x20 x20 2# x20 2# 20x 3# 2x10 3# x20       sidelying ER x20 x20 2# x20 2# 20x  3# x20       Prone shoulder ext x20 x20 2# x20 2# 20x 3# 2x10 3# x20       Prone rows x20 x20 2# x20 2# 20x         Prone horizontal abd x20 x20 2# x20 2# 20x 3# 2x10 3# x20       TB rows GTB 5"x20 GTB 5"x20 GTB 5"x20 GTB 5"x20 BTB 2x10 bluTB 5"x20       TB ext GTB 5"x20 GTB 5"x20 GTB 5"x20 GTB 5"x20 BTB 2x10 BluTB 5"x20       TB ER/IR GTB x20 ea GTB x20 GTB x20 GTB x20  BluTB x20       Serratus punches  x20 2# x20 2# 20x  3# x20       Seated active flex   x20 2# x20 2# 20x  3# x20       Shelf tap     Shelf tap 3#, 15x 3# x15       TB D1/D2 PNF flex      BluTB x20 ea                                              Ther Ex        pulleys 5 min 5 min 5 min 5 min 5' 5 min                    Gait Training                                       Modalities             CP post np

## 2021-09-16 ENCOUNTER — OFFICE VISIT (OUTPATIENT)
Dept: PHYSICAL THERAPY | Facility: MEDICAL CENTER | Age: 56
End: 2021-09-16
Payer: OTHER MISCELLANEOUS

## 2021-09-16 DIAGNOSIS — S46.102D INJURY OF TENDON OF LONG HEAD OF BICEPS, LEFT, SUBSEQUENT ENCOUNTER: ICD-10-CM

## 2021-09-16 DIAGNOSIS — M75.102 TEAR OF LEFT SUPRASPINATUS TENDON: Primary | ICD-10-CM

## 2021-09-16 PROCEDURE — 97140 MANUAL THERAPY 1/> REGIONS: CPT | Performed by: PHYSICAL THERAPIST

## 2021-09-16 PROCEDURE — 97112 NEUROMUSCULAR REEDUCATION: CPT | Performed by: PHYSICAL THERAPIST

## 2021-09-16 NOTE — PROGRESS NOTES
Daily Note     Today's date: 2021  Patient name: Shaan Mitchell  : 1965  MRN: [id number]  Referring provider: Yovana Byrne*  Dx:   Encounter Diagnosis     ICD-10-CM    1  Tear of left supraspinatus tendon  M75 102    2  Injury of tendon of long head of biceps, left, subsequent encounter  S46 102D                   Subjective: Pt c/o some ongoing intermittent soreness in shoulder since last week  Objective: See treatment diary below      Assessment: Tolerated treatment well  Continue to progress as able with functional strengthening  Remains most challenged w/ PNF exercises  Plan: Continue per plan of care  Precautions: protocol      Manuals       PROM L shoulder  10 min 10 min 10 min 10 min    LH 10 min 10 min                                             Neuro Re-Ed       Supine flex x20 x20 2# x20 2# 20x 3# 2x10 3# x20 3# x20      sidelying abd x20 x20 2# x20 2# 20x 3# 2x10 3# x20 3# x20      sidelying ER x20 x20 2# x20 2# 20x  3# x20 3# x20      Prone shoulder ext x20 x20 2# x20 2# 20x 3# 2x10 3# x20 3# x20      Prone rows x20 x20 2# x20 2# 20x   3# x20      Prone horizontal abd x20 x20 2# x20 2# 20x 3# 2x10 3# x20 3# x20      TB rows GTB 5"x20 GTB 5"x20 GTB 5"x20 GTB 5"x20 BTB 2x10 bluTB 5"x20 BluTB 5" x20      TB ext GTB 5"x20 GTB 5"x20 GTB 5"x20 GTB 5"x20 BTB 2x10 BluTB 5"x20 BluTB 5" x20      TB ER/IR GTB x20 ea GTB x20 GTB x20 GTB x20  BluTB x20 BluTB x20      Serratus punches  x20 2# x20 2# 20x  3# x20 3# x20      Seated active flex   x20 2# x20 2# 20x  3# x20 3# x20      Shelf tap     Shelf tap 3#, 15x 3# x15       TB D1/D2 PNF flex      BluTB x20 ea BluTB x20 ea                                             Ther Ex       pulleys 5 min 5 min 5 min 5 min 5' 5 min 5 min                   Gait Training                                       Modalities             CP post np

## 2021-09-20 ENCOUNTER — OFFICE VISIT (OUTPATIENT)
Dept: PHYSICAL THERAPY | Facility: MEDICAL CENTER | Age: 56
End: 2021-09-20
Payer: OTHER MISCELLANEOUS

## 2021-09-20 DIAGNOSIS — M75.102 TEAR OF LEFT SUPRASPINATUS TENDON: Primary | ICD-10-CM

## 2021-09-20 DIAGNOSIS — S46.102D INJURY OF TENDON OF LONG HEAD OF BICEPS, LEFT, SUBSEQUENT ENCOUNTER: ICD-10-CM

## 2021-09-20 PROCEDURE — 97140 MANUAL THERAPY 1/> REGIONS: CPT | Performed by: PHYSICAL THERAPIST

## 2021-09-20 PROCEDURE — 97112 NEUROMUSCULAR REEDUCATION: CPT | Performed by: PHYSICAL THERAPIST

## 2021-09-20 NOTE — PROGRESS NOTES
Daily Note     Today's date: 2021  Patient name: Delma Hayden  : 1965  MRN: [id number]  Referring provider: Len Mojica*  Dx:   Encounter Diagnosis     ICD-10-CM    1  Tear of left supraspinatus tendon  M75 102    2  Injury of tendon of long head of biceps, left, subsequent encounter  S46 102D                   Subjective: Pt reports decrease in achiness over the weekend  He continues to increase activity around the house and yard  Objective: See treatment diary below      Assessment: Tolerated treatment well  Continue to progress as able with functional strengthening  Less compensatory techniques noted today t/o strengthening  Plan: Continue per plan of care  Precautions: protocol      Manuals      PROM L shoulder  10 min 10 min 10 min 10 min    LH 10 min 10 min 10 min                                            Neuro Re-Ed      Supine flex x20 x20 2# x20 2# 20x 3# 2x10 3# x20 3# x20 4# x20     sidelying abd x20 x20 2# x20 2# 20x 3# 2x10 3# x20 3# x20 4# x20     sidelying ER x20 x20 2# x20 2# 20x  3# x20 3# x20 4# x20     Prone shoulder ext x20 x20 2# x20 2# 20x 3# 2x10 3# x20 3# x20 4# x20     Prone rows x20 x20 2# x20 2# 20x   3# x20 4# x20     Prone horizontal abd x20 x20 2# x20 2# 20x 3# 2x10 3# x20 3# x20 4# x20     TB rows GTB 5"x20 GTB 5"x20 GTB 5"x20 GTB 5"x20 BTB 2x10 bluTB 5"x20 BluTB 5" x20 BlaTB 5"x 20     TB ext GTB 5"x20 GTB 5"x20 GTB 5"x20 GTB 5"x20 BTB 2x10 BluTB 5"x20 BluTB 5" x20 BlaTB 5" x20     TB ER/IR GTB x20 ea GTB x20 GTB x20 GTB x20  BluTB x20 BluTB x20 BlaTB x20     Serratus punches  x20 2# x20 2# 20x  3# x20 3# x20 4# x20     Seated active flex   x20 2# x20 2# 20x  3# x20 3# x20 4# x20     Shelf tap     Shelf tap 3#, 15x 3# x15  np     TB D1/D2 PNF flex      BluTB x20 ea BluTB x20 ea BluTB x20 ea     Seated active scaption        4# x20                               Ther Ex 8/26 8/30 9/2 9/7 9/9 9/13 9/16 9/20     pulleys 5 min 5 min 5 min 5 min 5' 5 min 5 min 5 min                  Gait Training                                       Modalities             CP post np

## 2021-09-23 ENCOUNTER — OFFICE VISIT (OUTPATIENT)
Dept: PHYSICAL THERAPY | Facility: MEDICAL CENTER | Age: 56
End: 2021-09-23
Payer: OTHER MISCELLANEOUS

## 2021-09-23 DIAGNOSIS — M75.102 TEAR OF LEFT SUPRASPINATUS TENDON: Primary | ICD-10-CM

## 2021-09-23 DIAGNOSIS — S46.102D INJURY OF TENDON OF LONG HEAD OF BICEPS, LEFT, SUBSEQUENT ENCOUNTER: ICD-10-CM

## 2021-09-23 PROCEDURE — 97140 MANUAL THERAPY 1/> REGIONS: CPT | Performed by: PHYSICAL THERAPIST

## 2021-09-23 PROCEDURE — 97112 NEUROMUSCULAR REEDUCATION: CPT | Performed by: PHYSICAL THERAPIST

## 2021-09-23 NOTE — PROGRESS NOTES
Daily Note     Today's date: 2021  Patient name: Larry Rodgers  : 1965  MRN: [id number]  Referring provider: Yang Ashby*  Dx:   Encounter Diagnosis     ICD-10-CM    1  Tear of left supraspinatus tendon  M75 102    2  Injury of tendon of long head of biceps, left, subsequent encounter  S46 102D                   Subjective: Pt reports continued improvement  Objective: See treatment diary below      Assessment: Tolerated treatment well  Continue to progress as able with functional strengthening  Increased to [facility] w/ good tolerance  Plan: Continue per plan of care  Precautions: protocol      Manuals     PROM L shoulder  10 min 10 min 10 min 10 min    LH 10 min 10 min 10 min 10 min                                           Neuro Re-Ed     Supine flex x20 x20 2# x20 2# 20x 3# 2x10 3# x20 3# x20 4# x20 4# x20    sidelying abd x20 x20 2# x20 2# 20x 3# 2x10 3# x20 3# x20 4# x20 4# x20    sidelying ER x20 x20 2# x20 2# 20x  3# x20 3# x20 4# x20 4# x20    Prone shoulder ext x20 x20 2# x20 2# 20x 3# 2x10 3# x20 3# x20 4# x20 4# x20    Prone rows x20 x20 2# x20 2# 20x   3# x20 4# x20 4# x20    Prone horizontal abd x20 x20 2# x20 2# 20x 3# 2x10 3# x20 3# x20 4# x20 4# x20    TB rows GTB 5"x20 GTB 5"x20 GTB 5"x20 GTB 5"x20 BTB 2x10 bluTB 5"x20 BluTB 5" x20 BlaTB 5"x 20 BlaTB 5"x 20    TB ext GTB 5"x20 GTB 5"x20 GTB 5"x20 GTB 5"x20 BTB 2x10 BluTB 5"x20 BluTB 5" x20 BlaTB 5" x20 BlaTB 5" x20    TB ER/IR GTB x20 ea GTB x20 GTB x20 GTB x20  BluTB x20 BluTB x20 BlaTB x20 BlaTB x20    Serratus punches  x20 2# x20 2# 20x  3# x20 3# x20 4# x20 4# x20    Seated active flex   x20 2# x20 2# 20x  3# x20 3# x20 4# x20 4# x20    Shelf tap     Shelf tap 3#, 15x 3# x15  np     TB D1/D2 PNF flex      BluTB x20 ea BluTB x20 ea BluTB x20 ea BlaTB x20    Seated active scaption        4# x20 4# x20 Ther Ex 8/26 8/30 9/2 9/7 9/9 9/13 9/16 9/20 9/23    pulleys 5 min 5 min 5 min 5 min 5' 5 min 5 min 5 min 5 min                 Gait Training                                       Modalities             CP post np

## 2021-09-27 ENCOUNTER — OFFICE VISIT (OUTPATIENT)
Dept: PHYSICAL THERAPY | Facility: MEDICAL CENTER | Age: 56
End: 2021-09-27
Payer: OTHER MISCELLANEOUS

## 2021-09-27 DIAGNOSIS — S46.102D INJURY OF TENDON OF LONG HEAD OF BICEPS, LEFT, SUBSEQUENT ENCOUNTER: ICD-10-CM

## 2021-09-27 DIAGNOSIS — M75.102 TEAR OF LEFT SUPRASPINATUS TENDON: Primary | ICD-10-CM

## 2021-09-27 PROCEDURE — 97112 NEUROMUSCULAR REEDUCATION: CPT | Performed by: PHYSICAL THERAPIST

## 2021-09-27 PROCEDURE — 97140 MANUAL THERAPY 1/> REGIONS: CPT | Performed by: PHYSICAL THERAPIST

## 2021-09-27 NOTE — PROGRESS NOTES
Daily Note     Today's date: 2021  Patient name: Conner Elizabeth  : 1965  MRN: [id number]  Referring provider: Trudi Kirk*  Dx:   Encounter Diagnosis     ICD-10-CM    1  Tear of left supraspinatus tendon  M75 102    2  Injury of tendon of long head of biceps, left, subsequent encounter  S46 102D                   Subjective:  Pt reports improvement; no longer having pain  Objective: See treatment diary below      Assessment: Tolerated treatment well  Continue to progress as able with functional strengthening  Added UBE and wall push ups today  Plan: Continue per plan of care  Precautions: protocol      Manuals    PROM L shoulder  10 min 10 min 10 min 10 min    LH 10 min 10 min 10 min 10 min 10'                                          Neuro Re-Ed    Supine flex x20 x20 2# x20 2# 20x 3# 2x10 3# x20 3# x20 4# x20 4# x20 4# x 20   sidelying abd x20 x20 2# x20 2# 20x 3# 2x10 3# x20 3# x20 4# x20 4# x20 4# x 20   sidelying ER x20 x20 2# x20 2# 20x  3# x20 3# x20 4# x20 4# x20 4# x 20   Prone shoulder ext x20 x20 2# x20 2# 20x 3# 2x10 3# x20 3# x20 4# x20 4# x20 4# x 20   Prone rows x20 x20 2# x20 2# 20x   3# x20 4# x20 4# x20 4# x 20   Prone horizontal abd x20 x20 2# x20 2# 20x 3# 2x10 3# x20 3# x20 4# x20 4# x20 4# x 20   TB rows GTB 5"x20 GTB 5"x20 GTB 5"x20 GTB 5"x20 BTB 2x10 bluTB 5"x20 BluTB 5" x20 BlaTB 5"x 20 BlaTB 5"x 20 BlaTB 5" x 20   TB ext GTB 5"x20 GTB 5"x20 GTB 5"x20 GTB 5"x20 BTB 2x10 BluTB 5"x20 BluTB 5" x20 BlaTB 5" x20 BlaTB 5" x20 BlaTB 5" x20   TB ER/IR GTB x20 ea GTB x20 GTB x20 GTB x20  BluTB x20 BluTB x20 BlaTB x20 BlaTB x20 BlaTB x 20   Serratus punches  x20 2# x20 2# 20x  3# x20 3# x20 4# x20 4# x20 4# x 20   Seated active flex   x20 2# x20 2# 20x  3# x20 3# x20 4# x20 4# x20 4# x 20   Shelf tap     Shelf tap 3#, 15x 3# x15  np  np   TB D1/D2 PNF flex      BluTB x20 ea BluTB x20 ea BluTB x20 ea BlaTB x20 BlaTB x 20   Seated active scaption        4# x20 4# x20 4# x 20   Wall push ups          X 20                Ther Ex 8/26 8/30 9/2 9/7 9/9 9/13 9/16 9/20 9/23 9/27   pulleys 5 min 5 min 5 min 5 min 5' 5 min 5 min 5 min 5 min np   UBE          2' ea way   Gait Training                                       Modalities             CP post np

## 2021-09-30 ENCOUNTER — EVALUATION (OUTPATIENT)
Dept: PHYSICAL THERAPY | Facility: MEDICAL CENTER | Age: 56
End: 2021-09-30
Payer: OTHER MISCELLANEOUS

## 2021-09-30 DIAGNOSIS — M75.102 TEAR OF LEFT SUPRASPINATUS TENDON: Primary | ICD-10-CM

## 2021-09-30 DIAGNOSIS — S46.102D INJURY OF TENDON OF LONG HEAD OF BICEPS, LEFT, SUBSEQUENT ENCOUNTER: ICD-10-CM

## 2021-09-30 PROCEDURE — 97140 MANUAL THERAPY 1/> REGIONS: CPT | Performed by: PHYSICAL THERAPIST

## 2021-09-30 PROCEDURE — 97112 NEUROMUSCULAR REEDUCATION: CPT | Performed by: PHYSICAL THERAPIST

## 2021-09-30 NOTE — PROGRESS NOTES
PT Re-Evaluation  and PT Discharge    Today's date: 2021  Patient name: Delma Hayden  : 1965  MRN: [id number]  Referring provider: Len Mojica  Dx:   Encounter Diagnosis     ICD-10-CM    1  Tear of left supraspinatus tendon  M75 102    2  Injury of tendon of long head of biceps, left, subsequent encounter  S46 102D                   Assessment  Assessment details: Delma Hayden has attended 35 visits since initiating PT and has demonstrated overall improvement  Mobility and strength has improved with decreased reports of pain  Delma Hayden has demonstrated an improvement in function as well  Currently, he has made steady progress towards his goals, but continue to remain limited and apprehensive w/ heavy lifting  Has resumed all activity around the house and yard  Plan to d/c from PT  Has been independent w/ HEP  Understanding of Dx/Px/POC: excellent  Goals  Short Term Goals:  1  Pain decreased 3 subjective ratings in 6 weeks MET  2  PROM within normal limits in 6 weeks  MET  3  Full pain free cervical, wrist, and elbow ROM in 6 weeks MET  4  Strength increased 1 grade in 4 weeks MET    Long Term Goals:  1  AROM within normal limits in 12 weeks  MET  2  Patient will be independent with IADLS/ADLS  MET  3  Independent with HEP  MET  4  Patient will lift maximal weight within protocol limits MET    Plan  Patient would benefit from: skilled physical therapy  Planned modality interventions: cryotherapy  Planned therapy interventions: therapeutic exercise, stretching, strengthening, patient education, neuromuscular re-education, manual therapy, home exercise program, functional ROM exercises and flexibility  Frequency: 2x week  Duration in weeks: 1  Treatment plan discussed with: patient        Subjective Evaluation    History of Present Illness  Mechanism of injury: Pt reports significant improvement  Has progressed very well w/ strengthening    No issues w/ reaching into end ranges  Has avoided lifting anything heavier than 40#  He is employed as  and job duties do require heavy lifting, carrying    F/u w/ surgeon next week;     Pain  At best pain ratin  At worst pain ratin    Patient Goals  Patient goals for therapy: decreased pain, increased motion, increased strength, independence with ADLs/IADLs and return to work          Objective     Active Range of Motion   Cervical/Thoracic Spine     Normal active range of motion  Left Shoulder   Flexion: 160 degrees   Abduction: 172 degrees     Passive Range of Motion   Left Shoulder   Flexion: 170 degrees   Abduction: 170 degrees   External rotation 45°: 80 degrees   Internal rotation 45°: 70 degrees     Right Shoulder   Normal passive range of motion    Left Elbow   Normal passive range of motion    Left Wrist   Normal passive range of motion    Strength/Myotome Testing     Left Shoulder     Planes of Motion   Flexion: 4+   Abduction: 4+   External rotation at 90°: 4+   Internal rotation at 90°: 4+       Flowsheet Rows      Most Recent Value   PT/OT G-Codes   Current Score  69   Projected Score  65   FOTO information reviewed  Yes   Assessment Type  Discharge   G code set  Other PT/OT Primary   Other PT Primary Goal Status ()  CJ   Other PT Primary Discharge Status ()  CJ             Precautions: protocol      Manuals             PROM L shoulder  10 min            TPR L rhomboid 5 min                                      Neuro Re-Ed 9 30            Supine flex 4# x20            sidelying abd 4# x20            sidelying ER 4# x20            Prone shoulder ext 4# x20            Prone rows 4# x20            Prone horizontal abd 4# x20            TB rows BlaTB 5"x20            TB ext BlaTB 5"x20            TB ER/IR BlaTB x20 ea            TB PNF flex D1/D2 BlaTB x20            Wall push ups x20            UBE 2 ' ea way                                                                Ther Activity Gait Training                                       Modalities             CP post np

## 2021-10-04 ENCOUNTER — OFFICE VISIT (OUTPATIENT)
Dept: OBGYN CLINIC | Facility: OTHER | Age: 56
End: 2021-10-04
Payer: OTHER MISCELLANEOUS

## 2021-10-04 VITALS
HEART RATE: 72 BPM | WEIGHT: 165 LBS | HEIGHT: 68 IN | SYSTOLIC BLOOD PRESSURE: 133 MMHG | BODY MASS INDEX: 25.01 KG/M2 | DIASTOLIC BLOOD PRESSURE: 82 MMHG

## 2021-10-04 DIAGNOSIS — M75.102 TEAR OF LEFT SUPRASPINATUS TENDON: Primary | ICD-10-CM

## 2021-10-04 PROCEDURE — 99213 OFFICE O/P EST LOW 20 MIN: CPT | Performed by: PHYSICIAN ASSISTANT

## 2021-10-05 ENCOUNTER — APPOINTMENT (OUTPATIENT)
Dept: PHYSICAL THERAPY | Facility: MEDICAL CENTER | Age: 56
End: 2021-10-05
Payer: OTHER MISCELLANEOUS

## 2021-10-06 ENCOUNTER — OFFICE VISIT (OUTPATIENT)
Dept: PHYSICAL THERAPY | Facility: MEDICAL CENTER | Age: 56
End: 2021-10-06
Payer: OTHER MISCELLANEOUS

## 2021-10-06 DIAGNOSIS — M75.102 TEAR OF LEFT SUPRASPINATUS TENDON: ICD-10-CM

## 2021-10-06 PROCEDURE — 97112 NEUROMUSCULAR REEDUCATION: CPT | Performed by: PHYSICAL THERAPIST

## 2021-10-11 ENCOUNTER — TELEPHONE (OUTPATIENT)
Dept: FAMILY MEDICINE CLINIC | Facility: MEDICAL CENTER | Age: 56
End: 2021-10-11

## 2021-10-11 DIAGNOSIS — R79.89 LOW TSH LEVEL: Primary | ICD-10-CM

## 2021-10-13 ENCOUNTER — OFFICE VISIT (OUTPATIENT)
Dept: PHYSICAL THERAPY | Facility: MEDICAL CENTER | Age: 56
End: 2021-10-13
Payer: OTHER MISCELLANEOUS

## 2021-10-13 DIAGNOSIS — M75.102 TEAR OF LEFT SUPRASPINATUS TENDON: Primary | ICD-10-CM

## 2021-10-13 DIAGNOSIS — S46.102D INJURY OF TENDON OF LONG HEAD OF BICEPS, LEFT, SUBSEQUENT ENCOUNTER: ICD-10-CM

## 2021-10-13 PROCEDURE — 97112 NEUROMUSCULAR REEDUCATION: CPT | Performed by: PHYSICAL THERAPIST

## 2021-10-20 LAB
T3 SERPL-MCNC: 125 NG/DL (ref 76–181)
T4 FREE SERPL-MCNC: 1.2 NG/DL (ref 0.8–1.8)
THYROGLOB AB SERPL-ACNC: <1 IU/ML
THYROPEROXIDASE AB SERPL-ACNC: 1 IU/ML
TSH SERPL-ACNC: 0.81 MIU/L (ref 0.4–4.5)

## 2021-10-21 ENCOUNTER — OFFICE VISIT (OUTPATIENT)
Dept: PHYSICAL THERAPY | Facility: MEDICAL CENTER | Age: 56
End: 2021-10-21
Payer: OTHER MISCELLANEOUS

## 2021-10-21 DIAGNOSIS — M75.102 TEAR OF LEFT SUPRASPINATUS TENDON: Primary | ICD-10-CM

## 2021-10-21 DIAGNOSIS — S46.102D INJURY OF TENDON OF LONG HEAD OF BICEPS, LEFT, SUBSEQUENT ENCOUNTER: ICD-10-CM

## 2021-10-21 PROCEDURE — 97112 NEUROMUSCULAR REEDUCATION: CPT | Performed by: PHYSICAL THERAPIST

## 2021-10-27 ENCOUNTER — OFFICE VISIT (OUTPATIENT)
Dept: PHYSICAL THERAPY | Facility: MEDICAL CENTER | Age: 56
End: 2021-10-27
Payer: OTHER MISCELLANEOUS

## 2021-10-27 DIAGNOSIS — S46.102D INJURY OF TENDON OF LONG HEAD OF BICEPS, LEFT, SUBSEQUENT ENCOUNTER: ICD-10-CM

## 2021-10-27 DIAGNOSIS — M75.102 TEAR OF LEFT SUPRASPINATUS TENDON: Primary | ICD-10-CM

## 2021-10-27 PROCEDURE — 97112 NEUROMUSCULAR REEDUCATION: CPT | Performed by: PHYSICAL THERAPIST

## 2021-11-02 ENCOUNTER — APPOINTMENT (OUTPATIENT)
Dept: PHYSICAL THERAPY | Facility: MEDICAL CENTER | Age: 56
End: 2021-11-02
Payer: OTHER MISCELLANEOUS

## 2021-11-04 ENCOUNTER — OFFICE VISIT (OUTPATIENT)
Dept: PHYSICAL THERAPY | Facility: MEDICAL CENTER | Age: 56
End: 2021-11-04
Payer: OTHER MISCELLANEOUS

## 2021-11-04 DIAGNOSIS — S46.102D INJURY OF TENDON OF LONG HEAD OF BICEPS, LEFT, SUBSEQUENT ENCOUNTER: ICD-10-CM

## 2021-11-04 DIAGNOSIS — M75.102 TEAR OF LEFT SUPRASPINATUS TENDON: Primary | ICD-10-CM

## 2021-11-04 PROCEDURE — 97112 NEUROMUSCULAR REEDUCATION: CPT | Performed by: PHYSICAL THERAPIST

## 2021-11-11 ENCOUNTER — OFFICE VISIT (OUTPATIENT)
Dept: PHYSICAL THERAPY | Facility: MEDICAL CENTER | Age: 56
End: 2021-11-11
Payer: OTHER MISCELLANEOUS

## 2021-11-11 DIAGNOSIS — M75.102 TEAR OF LEFT SUPRASPINATUS TENDON: Primary | ICD-10-CM

## 2021-11-11 DIAGNOSIS — S46.102D INJURY OF TENDON OF LONG HEAD OF BICEPS, LEFT, SUBSEQUENT ENCOUNTER: ICD-10-CM

## 2021-11-11 PROCEDURE — 97112 NEUROMUSCULAR REEDUCATION: CPT

## 2021-11-15 ENCOUNTER — OFFICE VISIT (OUTPATIENT)
Dept: OBGYN CLINIC | Facility: OTHER | Age: 56
End: 2021-11-15
Payer: OTHER MISCELLANEOUS

## 2021-11-15 VITALS
HEART RATE: 75 BPM | SYSTOLIC BLOOD PRESSURE: 114 MMHG | HEIGHT: 68 IN | DIASTOLIC BLOOD PRESSURE: 83 MMHG | WEIGHT: 170 LBS | BODY MASS INDEX: 25.76 KG/M2

## 2021-11-15 DIAGNOSIS — M75.102 TEAR OF LEFT SUPRASPINATUS TENDON: Primary | ICD-10-CM

## 2021-11-15 PROCEDURE — 99213 OFFICE O/P EST LOW 20 MIN: CPT | Performed by: PHYSICIAN ASSISTANT

## 2021-11-15 RX ORDER — AMOXICILLIN 500 MG/1
500 CAPSULE ORAL 2 TIMES DAILY
COMMUNITY
Start: 2021-09-21 | End: 2021-11-15 | Stop reason: ALTCHOICE

## 2021-11-15 RX ORDER — DEXAMETHASONE 6 MG/1
6 TABLET ORAL DAILY
COMMUNITY
Start: 2021-09-08 | End: 2022-03-24

## 2021-11-15 RX ORDER — TOBRAMYCIN AND DEXAMETHASONE 3; 1 MG/ML; MG/ML
SUSPENSION/ DROPS OPHTHALMIC
COMMUNITY
Start: 2021-09-08 | End: 2022-03-24

## 2021-11-15 RX ORDER — AZITHROMYCIN 500 MG/1
500 TABLET, FILM COATED ORAL DAILY
COMMUNITY
Start: 2021-09-08 | End: 2021-11-15 | Stop reason: ALTCHOICE

## 2021-11-16 ENCOUNTER — APPOINTMENT (OUTPATIENT)
Dept: PHYSICAL THERAPY | Facility: MEDICAL CENTER | Age: 56
End: 2021-11-16
Payer: OTHER MISCELLANEOUS

## 2021-11-18 ENCOUNTER — IMMUNIZATIONS (OUTPATIENT)
Dept: FAMILY MEDICINE CLINIC | Facility: MEDICAL CENTER | Age: 56
End: 2021-11-18
Payer: COMMERCIAL

## 2021-11-18 DIAGNOSIS — Z23 ENCOUNTER FOR IMMUNIZATION: Primary | ICD-10-CM

## 2021-11-18 PROCEDURE — 90471 IMMUNIZATION ADMIN: CPT

## 2021-11-18 PROCEDURE — 90682 RIV4 VACC RECOMBINANT DNA IM: CPT

## 2021-12-22 ENCOUNTER — TELEPHONE (OUTPATIENT)
Dept: FAMILY MEDICINE CLINIC | Facility: MEDICAL CENTER | Age: 56
End: 2021-12-22

## 2021-12-22 DIAGNOSIS — Z20.822 EXPOSURE TO COVID-19 VIRUS: Primary | ICD-10-CM

## 2021-12-24 PROCEDURE — U0005 INFEC AGEN DETEC AMPLI PROBE: HCPCS | Performed by: FAMILY MEDICINE

## 2021-12-24 PROCEDURE — U0003 INFECTIOUS AGENT DETECTION BY NUCLEIC ACID (DNA OR RNA); SEVERE ACUTE RESPIRATORY SYNDROME CORONAVIRUS 2 (SARS-COV-2) (CORONAVIRUS DISEASE [COVID-19]), AMPLIFIED PROBE TECHNIQUE, MAKING USE OF HIGH THROUGHPUT TECHNOLOGIES AS DESCRIBED BY CMS-2020-01-R: HCPCS | Performed by: FAMILY MEDICINE

## 2022-03-21 ENCOUNTER — TELEPHONE (OUTPATIENT)
Dept: FAMILY MEDICINE CLINIC | Facility: MEDICAL CENTER | Age: 57
End: 2022-03-21

## 2022-03-21 NOTE — TELEPHONE ENCOUNTER
Pt's wife called to say that the pt has been having episodes of afib after working out  She put her smart watch on him to confirm it  She said the last time, it was accompanied by heartburn  Pt does not have a cardiologist  It happened last on the weekend for two days  Please, triage

## 2022-03-21 NOTE — TELEPHONE ENCOUNTER
Patient needs an appt for this  If he is actively having chest pain and SOB then he needs to go to the ER  But it sounds like it is just after working out, still needs to be see so we can do an EKG and/or refer to cardiology if needed

## 2022-03-23 NOTE — PROGRESS NOTES
[address] Note  [place], [place], 20     Claudio Cummings MRN: [id number] : 1965 Age: 60 y o  Assessment/Plan     1  Racing heart beat    - regular rate and rhythm today in office, follow-up EKG  Cardiology referral in case evaluation with Holter monitor needed  - no chest pain, dyspnea, lightheadedness, dizziness  - ECG 12 lead; Future  - Ambulatory Referral to Cardiology; Future    2  Heart burn    - antacids p r n , follow-up if symptoms not improving and consider escalation and management with PPI/H2 blocker trial   - educational material provided      Claudio Cummings acknowledged understanding of treatment plan, all questions answered  Subjective      Claudio Cummings is a 60 y o  male who presents with concern of atrial fibrillation noted on fitness watch after exercise  Patient exercises at home on elliptical, resistance bands, "total gym"  Patient reports that after an intense workout wife checked heart rate on fitness watch and it did read that he was in atrial fibrillation, after he rested he reports that it did report sinus rhythm  Patient reports the day prior he did have 2 beers and experienced some heartburn thereafter  Otherwise he does not report episodes of chest pain  [place] No palpitations  No syncope  Denies dizziness, denies lightheadedness  No SOB       The following portions of the patient's history were reviewed and updated as appropriate: allergies, current medications, past family history, past medical history, past social history, past surgical history and problem list      Past Medical History:   Diagnosis Date    Colon polyp     History of ulcerative colitis        No Known Allergies    Past Surgical History:   Procedure Laterality Date    COLONOSCOPY      Fiberoptic    NO PAST SURGERIES      MS SHLDR ARTHROSCOP,SURG,W/ROTAT CUFF REPR Left 2021    Procedure: SHOULDER ARTHROSCOPIC ROTATOR CUFF REPAIR, BICEPS TENODESIS, EXTENSIVE DEBRIDEMENT;  Surgeon: Henry Justice MD;  Location: [facility];  Service: Orthopedics       Family History   Problem Relation Age of Onset    Lung cancer Mother     Arthritis Father     Heart disease Family         Cardiac Disorder       Social History     Socioeconomic History    Marital status: /Civil Union     Spouse name: None    Number of children: None    Years of education: None    Highest education level: None   Occupational History    Occupation:    Tobacco Use    Smoking status: Former Smoker    Smokeless tobacco: Never Used    Tobacco comment: Quit at [employer]    Vaping Use: Never used   Substance and Sexual Activity    Alcohol use: Yes     Comment: Social    Drug use: Not Currently    Sexual activity: Yes     Partners: Female   Other Topics Concern    None   Social History Narrative    Drinks coffee     Social Determinants of Health     Financial Resource Strain: Not on file   Food Insecurity: Not on file   Transportation Needs: Not on file   Physical Activity: Not on file   Stress: Not on file   Social Connections: Not on file   Intimate Partner Violence: Not on file   Housing Stability: Not on file       Current Outpatient Medications   Medication Sig Dispense Refill    Multiple Vitamin (MULTI-VITAMIN PO) Take by mouth daily       No current facility-administered medications for this visit  Review of Systems   Respiratory: Negative for shortness of breath  Cardiovascular: Negative for chest pain and palpitations  Neurological: Negative for dizziness and light-headedness  And as noted in HPI    Objective      /72 (BP Location: Left arm, Patient Position: Sitting, Cuff Size: Adult)   Pulse 78   Temp 97 6 °F (36 4 °C)   Ht 5' 8" (1 727 m)   Wt 77 1 kg (170 lb)   SpO2 98%   BMI 25 85 kg/m²     Physical Exam  Vitals reviewed  Constitutional:       General: He is not in acute distress  Appearance: Normal appearance   He is normal weight  He is not ill-appearing, toxic-appearing or diaphoretic  HENT:      Head: Normocephalic and atraumatic  Mouth/Throat:      Mouth: Mucous membranes are moist       Pharynx: Oropharynx is clear  Eyes:      Extraocular Movements: Extraocular movements intact  Conjunctiva/sclera: Conjunctivae normal       Pupils: Pupils are equal, round, and reactive to light  Cardiovascular:      Rate and Rhythm: Normal rate and regular rhythm  Pulses: Normal pulses  Heart sounds: Normal heart sounds  No murmur heard  Pulmonary:      Effort: Pulmonary effort is normal       Breath sounds: Normal breath sounds  Musculoskeletal:      Right lower leg: No edema  Left lower leg: No edema  Skin:     General: Skin is warm and dry  Capillary Refill: Capillary refill takes less than 2 seconds  Neurological:      Mental Status: He is alert and oriented to person, place, and time  Psychiatric:         Mood and Affect: Mood normal          Behavior: Behavior normal          Thought Content: Thought content normal          Judgment: Judgment normal              Some portions of this record may have been generated with voice recognition software  There may be translation, syntax, or grammatical errors  Occasional wrong word or "sound-a-like" substitutions may have occurred due to the inherent limitations of the voice recognition software  Read the chart carefully and recognize, using context, where substations may have occurred  If you have any questions, please contact the dictating provider for clarification or correction, as needed

## 2022-03-24 ENCOUNTER — OFFICE VISIT (OUTPATIENT)
Dept: FAMILY MEDICINE CLINIC | Facility: MEDICAL CENTER | Age: 57
End: 2022-03-24
Payer: COMMERCIAL

## 2022-03-24 VITALS
WEIGHT: 170 LBS | TEMPERATURE: 97.6 F | HEART RATE: 78 BPM | SYSTOLIC BLOOD PRESSURE: 122 MMHG | HEIGHT: 68 IN | DIASTOLIC BLOOD PRESSURE: 72 MMHG | OXYGEN SATURATION: 98 % | BODY MASS INDEX: 25.76 KG/M2

## 2022-03-24 DIAGNOSIS — R00.0 RACING HEART BEAT: Primary | ICD-10-CM

## 2022-03-24 DIAGNOSIS — R12 HEART BURN: ICD-10-CM

## 2022-03-24 PROCEDURE — 99213 OFFICE O/P EST LOW 20 MIN: CPT | Performed by: STUDENT IN AN ORGANIZED HEALTH CARE EDUCATION/TRAINING PROGRAM

## 2022-03-24 PROCEDURE — 3725F SCREEN DEPRESSION PERFORMED: CPT | Performed by: STUDENT IN AN ORGANIZED HEALTH CARE EDUCATION/TRAINING PROGRAM

## 2022-03-24 PROCEDURE — 3008F BODY MASS INDEX DOCD: CPT | Performed by: STUDENT IN AN ORGANIZED HEALTH CARE EDUCATION/TRAINING PROGRAM

## 2022-03-24 NOTE — PATIENT INSTRUCTIONS
Gastroesophageal Reflux Disease   WHAT YOU NEED TO KNOW:   Gastroesophageal reflux disease (GERD) occurs when stomach acid and food in the stomach reflux (back up) into the esophagus  WHILE YOU ARE HERE:   Informed consent  is a legal document that explains the tests, treatments, or procedures that you may need  Informed consent means you understand what will be done and can make decisions about what you want  You give your permission when you sign the consent form  You can have someone sign this form for you if you are not able to sign it  You have the right to understand your medical care in words you know  Before you sign the consent form, understand the risks and benefits of what will be done  Make sure all your questions are answered  Soft food diet: You may be allowed to eat soft foods  Some examples are applesauce, baby food, bananas, cooked cereal, cottage cheese, eggs, gelatin, pudding, and yogurt  Medicines:   · Antacids  decrease the stomach acid that can irritate your esophagus and stomach  · Histamine type-2 receptor blockers,  also called H2 blockers, block acid production in the stomach  · Proton pump inhibitors,  also called PPIs, block acid production in the stomach  · Promotility agents  help the lower esophageal sphincter and stomach contract (tighten) more  Tests:   · Esophageal pH monitoring  is used to place a small probe inside your esophagus and stomach to check the amount of acid  · An endoscopy  is a procedure used to look at the inside of your esophagus and stomach  An endoscope is a bendable tube with a light and camera on the end  Your healthcare provider may remove a small sample of tissue and send it to a lab for tests  · Upper GI x-rays  are done to take pictures of your stomach and intestines (bowel)  You may be given a chalky liquid to drink before the pictures are taken  This liquid helps your stomach and intestines show up better on the x-rays  · Esophageal manometry  is a test that shows how your esophagus pushes food and fluid to your stomach  It also shows the pressures in your esophagus and stomach  It may show a hiatal hernia  Treatment:  Surgery is done to wrap the upper part of the stomach around the esophageal sphincter  This will strengthen the sphincter and prevent reflux  RISKS:   You may bleed too much or develop an infection after surgery  Surgery to treat GERD can also make you feel bloated after meals  GERD may increase your risk for ulcers and bleeding in your stomach  GERD may also increase your risk for cancer or other health conditions  CARE AGREEMENT:   You have the right to help plan your care  Learn about your health condition and how it may be treated  Discuss treatment options with your caregivers to decide what care you want to receive  You always have the right to refuse treatment  © 2017 [employer] is for End User's use only and may not be sold, redistributed or otherwise used for commercial purposes  All illustrations and images included in CareNotes® are the copyrighted property of [employer]  or [employer]  The above information is an  only  It is not intended as medical advice for individual conditions or treatments  Talk to your doctor, nurse or pharmacist before following any medical regimen to see if it is safe and effective for you

## 2022-05-12 ENCOUNTER — OFFICE VISIT (OUTPATIENT)
Dept: CARDIOLOGY CLINIC | Facility: MEDICAL CENTER | Age: 57
End: 2022-05-12
Payer: COMMERCIAL

## 2022-05-12 VITALS
OXYGEN SATURATION: 97 % | BODY MASS INDEX: 25.31 KG/M2 | HEIGHT: 68 IN | DIASTOLIC BLOOD PRESSURE: 72 MMHG | HEART RATE: 81 BPM | SYSTOLIC BLOOD PRESSURE: 124 MMHG | WEIGHT: 167 LBS

## 2022-05-12 DIAGNOSIS — R07.2 PRECORDIAL PAIN: Primary | ICD-10-CM

## 2022-05-12 DIAGNOSIS — I47.9 PAROXYSMAL TACHYCARDIA (HCC): ICD-10-CM

## 2022-05-12 PROCEDURE — 3008F BODY MASS INDEX DOCD: CPT | Performed by: INTERNAL MEDICINE

## 2022-05-12 PROCEDURE — 93000 ELECTROCARDIOGRAM COMPLETE: CPT | Performed by: INTERNAL MEDICINE

## 2022-05-12 PROCEDURE — 99204 OFFICE O/P NEW MOD 45 MIN: CPT | Performed by: INTERNAL MEDICINE

## 2022-05-12 NOTE — ASSESSMENT & PLAN NOTE
Prior episode of upper back pain and neck pain related to strenuous upper body exercise  No recurrence on treadmill  Episode of heartburn related to beer drinking  No exertional symptoms  ASCVD risk is about 5%  Former smoker  Lipids are good  Stress testing planned for further cardiac risk assessment

## 2022-05-12 NOTE — PROGRESS NOTES
[facility] SURINDER Anne [address] [place] [id number]  Phone#  [phone]  Fax#  [phone]  [facility] Cardiology Office Consultation             NAME: Jolie Cervantes  AGE: 60 y o  SEX: male   : 1965   MRN: [id number]    DATE: 2022  TIME: 3:16 PM    Assessment and plan:    Paroxysmal tachycardia ([address] )  Concerns for atrial fibrillation on exercise on home monitoring with smart watch  No significant underlying risk factors for atrial fibrillation except alcohol use  No symptoms reported  Stress testing planned for further cardiac risk assessment to assess for stress induced AF  Continue home rhythm monitoring and if further concerns for AFib, bring recordings for review  Precordial pain  Prior episode of upper back pain and neck pain related to strenuous upper body exercise  No recurrence on treadmill  Episode of heartburn related to beer drinking  No exertional symptoms  ASCVD risk is about 5%  Former smoker  Lipids are good  Stress testing planned for further cardiac risk assessment  Chief Complaint   Patient presents with   [address] [place] [address] Patient Visit     Referred to by PCP for irregular heart beat       HPI:    Jolie Cervantes is a 60y o -year-old male who presents to the cardiology clinic for evaluation  He saw his primary physician in 3/22 with concerns of atrial fibrillation on fitness watch after exercise  After intense exercise his watch reported probable atrial fibrillation and after rest it went back to reporting sinus rhythm  He did not feel any differently during this recording of dysrhythmia  He reports some heartburn at times but no definite exertional chest pain, dizziness or syncope  Prior episode of upper back pain and neck pain related to strenuous upper body exercise  No recurrence on treadmill  Episode of heartburn related to beer drinking  No exertional symptoms      Had done extreme shoulder exercise and push up and had some back pain  No family history of CAD  Quit smoking at 33 years age  Smoker for 15 years  HDL is excellent  Denies recent hospitalizations  No side effects reported  Denies chest pain on exertion  Denies worsening shortness of breath  Denies sustained palpitations  Drinks 1-2 beer/wine daily  The 10-year ASCVD risk score (Jenny Patino et al , 2013) is: 4 9%    Values used to calculate the score:      Age: 60 years      Sex: Male      Is Non- : No      Diabetic: No      Tobacco smoker: No      Systolic Blood Pressure: 535 mmHg      Is BP treated: No      HDL Cholesterol: 79 mg/dL      Total Cholesterol: 201 mg/dL      Current symptoms:  No further chest pain or shortness of breath  Continues to be very active  Exercises on the treadmill on a regular basis  Does still report some heartburn  Cardiology Problem list:  Palpitations and tachycardia:  ?Atrial fib on home monitoring  Stress testing planned:  Musculoskeletal back pain and GERD  Covid 19 in March 20    Past history, family history, social history, current medications, vital signs, recent lab and imaging studies and  prior cardiology studies reviewed independently on this visit  BP Readings from Last 4 Encounters:   05/12/22 124/72   03/24/22 122/72   11/15/21 114/83   10/04/21 133/82      Wt Readings from Last 3 Encounters:   05/12/22 75 8 kg (167 lb)   03/24/22 77 1 kg (170 lb)   11/15/21 77 1 kg (170 lb)       Lab Results   Component Value Date    LDLCALC 107 (H) 09/09/2021     Lab Results   Component Value Date    CREATININE 0 79 09/09/2021          ALLERGIES:  No Known Allergies      Current Outpatient Medications:     Multiple Vitamin (MULTI-VITAMIN PO), Take by mouth daily, Disp: , Rfl:       Review of Systems   Constitutional: Negative  HENT: Negative  Eyes: Negative  Respiratory: Negative for cough and shortness of breath  Cardiovascular: Positive for chest pain   Negative for palpitations  Gastrointestinal: Positive for abdominal pain  Endocrine: Negative  Genitourinary: Negative  Musculoskeletal: Positive for back pain  Skin: Negative  Allergic/Immunologic: Negative  Neurological: Negative  Hematological: Does not bruise/bleed easily  Psychiatric/Behavioral: Negative  Past Medical History:   Diagnosis Date    Colon polyp     History of ulcerative colitis        Past Surgical History:   Procedure Laterality Date    COLONOSCOPY      Fiberoptic    NO PAST SURGERIES      ND SHLDR ARTHROSCOP,SURG,W/ROTAT CUFF REPR Left 5/26/2021    Procedure: SHOULDER ARTHROSCOPIC ROTATOR CUFF REPAIR, BICEPS TENODESIS, EXTENSIVE DEBRIDEMENT;  Surgeon: Kwame Easton MD;  Location: [facility];  Service: Orthopedics       Family History   Problem Relation Age of Onset    Lung cancer Mother     Arthritis Father     Heart disease Family         Cardiac Disorder       Social History   reports that he has quit smoking  He has never used smokeless tobacco  He reports current alcohol use  He reports previous drug use  Objective:     Vitals:    05/12/22 1442   BP: 124/72   Pulse: 81   SpO2: 97%     Wt Readings from Last 3 Encounters:   05/12/22 75 8 kg (167 lb)   03/24/22 77 1 kg (170 lb)   11/15/21 77 1 kg (170 lb)     Pulse Readings from Last 3 Encounters:   05/12/22 81   03/24/22 78   11/15/21 75     BP Readings from Last 3 Encounters:   05/12/22 124/72   03/24/22 122/72   11/15/21 114/83         Physical Exam  Vitals reviewed  Constitutional:       General: He is not in acute distress  HENT:      Head: Normocephalic  Right Ear: External ear normal       Left Ear: External ear normal    Eyes:      General: No scleral icterus  Neck:      Vascular: No carotid bruit  Cardiovascular:      Rate and Rhythm: Normal rate and regular rhythm  Heart sounds: S1 normal and S2 normal  No murmur heard  Pulmonary:      Breath sounds: Normal breath sounds   No wheezing or rhonchi  Abdominal:      General: There is no distension  Musculoskeletal:      Right lower leg: No edema  Left lower leg: No edema  Skin:     General: Skin is warm  Findings: No lesion  Neurological:      General: No focal deficit present  Mental Status: He is alert  Psychiatric:         Mood and Affect: Mood normal          Pertinent Laboratory/Diagnostic Studies:    Laboratory studies reviewed personally by Lesly Malcolm MD    BMP:   Lab Results   Component Value Date    SODIUM 136 09/09/2021    K 4 3 09/09/2021     09/09/2021    CO2 28 09/09/2021    BUN 17 09/09/2021    CREATININE 0 79 09/09/2021    GLUC 99 09/09/2021    CALCIUM 10 3 09/09/2021     CBC:  Lab Results   Component Value Date    WBC 9 9 09/09/2021    WBC 5 6 02/25/2017    RBC 5 13 09/09/2021    HGB 15 4 09/09/2021    HGB 16 5 02/25/2017    HCT 46 8 09/09/2021    HCT 49 2 02/25/2017    MCV 91 2 09/09/2021    MCV 92 02/25/2017    MCH 30 0 09/09/2021    MCH 30 7 02/25/2017    RDW 13 1 09/09/2021    RDW 13 4 02/25/2017     09/09/2021     02/25/2017     Coags:    Lipid Profile:   Lab Results   Component Value Date    CHOL 217 (H) 02/25/2017     Lab Results   Component Value Date    HDL 79 09/09/2021     Lab Results   Component Value Date    LDLCALC 107 (H) 09/09/2021     Lab Results   Component Value Date    TRIG 66 09/09/2021      Lab Results   Component Value Date    OBU7JMNPDMPE 0 798 02/25/2017    TSH 0 81 10/19/2021     Lab Results   Component Value Date    ALT 12 09/09/2021    AST 21 09/09/2021         Imaging Studies:   No results found  Cardiac testing:   EKG reviewed personally: normal EKG, normal sinus rhythm  Orders Placed This Encounter   Procedures    Stress test only, exercise    POCT ECG           Shelli Faye MD, [facility] [place]    Portions of the record may have been created with voice recognition software    Occasional wrong word or "sound a like" substitutions may have occurred due to the inherent limitations of voice recognition software  Read the chart carefully and recognize, using context, where substitutions have occurred  If you have any questions or concerns about the context, text or information contained within the body of this dictation, please contact myself, the provider, for further clarification

## 2022-05-12 NOTE — PATIENT INSTRUCTIONS
Stress testing planned for further cardiac risk assessment  Continue exercise  Report any worsening cardiac symptoms (chest pain,shortness of breath with exertion or fainting)  Keep follow-up as planned with primary care  [address] [facility] diet: A heart-healthy eating plan    What is the Mediterranean diet? The Mediterranean diet is a way of eating based on the traditional cuisine of countries bordering the [facility]  While there is no single definition of the Mediterranean diet, it is typically high in vegetables, fruits, whole grains, beans, nut and seeds, and olive oil  The main components of Mediterranean diet include:    Daily consumption of vegetables, fruits, whole grains and healthy fats  Weekly intake of fish, poultry, beans and eggs  Moderate portions of dairy products  Limited intake of red meat  Other important elements of the Mediterranean diet are sharing meals with family and friends    Plant based, not meat based  The foundation of the Mediterranean diet is vegetables, fruits, herbs, nuts, beans and whole grains  Meals are built around these plant-based foods  Small amounts of dairy, poultry and eggs are also central to the Mediterranean Diet, as is seafood  In contrast, red meat is eaten only occasionally  Healthy fats  Healthy fats are a mainstay of the Mediterranean diet  They're eaten instead of less healthy fats, such as saturated and trans fats, which contribute to heart disease  Olive oil is the primary source of added fat in the Mediterranean diet  Olive oil provides monounsaturated fat, which has been found to lower total cholesterol and low-density lipoprotein (LDL or "bad") cholesterol levels  Nuts and seeds also contain monounsaturated fat  Fish are also important in the [address] [facility]   Fatty fish -- such as mackerel, herring, sardines, albacore tuna, salmon and lake trout -- are rich in omega-3 fatty acids, a type of polyunsaturated fat that may reduce inflammation in the body  Omega-3 fatty acids also help decrease triglycerides, reduce blood clotting, and decrease the risk of stroke and heart failure  Eating the [address]  Interested in trying the [address]? These tips will help you get started:    Eat more fruits and vegetables  Aim for 7 to 10 servings a day of fruit and vegetables  Opt for whole grains  Switch to whole-grain bread, cereal and pasta  [place] with other whole grains  Use healthy fats  Try olive oil as a replacement for butter when cooking  Instead of putting butter or margarine on bread, try dipping it in flavored olive oil  Eat more seafood  Eat fish twice a week  Fresh or water-packed tuna, salmon, trout, mackerel and herring are healthy choices  Grilled fish tastes good and requires little cleanup  Avoid deep-fried fish  Reduce red meat  Substitute fish, poultry or beans for meat  If you eat meat, make sure it's lean and keep portions small  Spice it up  Herbs and spices boost flavor and lessen the need for salt  The Mediterranean diet is a delicious and healthy way to eat  Many people who switch to this style of eating say they'll never eat any other way      Source: http://www ace-ann org/

## 2022-05-12 NOTE — LETTER
May 12, 2022     Lauro Rizo, DO  [address]    Patient: Barb Layne   YOB: 1965   Date of Visit: 2022       Dear Dr Prakash Vogt:    Thank you for referring Barb Layne to me for evaluation  Below are my notes for this consultation  If you have questions, please do not hesitate to call me  I look forward to following your patient along with you  Sincerely,        Yosvany Nicole MD        CC: No Recipients  oYsvany Nicole MD  2022  3:14 PM  Incomplete  [facility] [address] [address]  Phone#  [phone]  Fax#  [phone]  [facility] Cardiology Office Consultation             NAME: Barb Layne  AGE: 60 y o  SEX: male   : 1965   MRN: [id number]    DATE: 2022  TIME: 3:12 PM    Assessment and plan:    Paroxysmal tachycardia ([address] )  Concerns for atrial fibrillation on exercise on home monitoring with smart watch  No significant underlying risk factors for atrial fibrillation except alcohol use  No symptoms reported  Stress testing planned for further cardiac risk assessment to assess for stress induced AF  Continue home rhythm monitoring and if further concerns for AFib, bring recordings for review  Precordial pain  Prior episode of upper back pain and neck pain related to strenuous upper body exercise  No recurrence on treadmill  Episode of heartburn related to beer drinking  No exertional symptoms  ASCVD risk is about 5%  Former smoker  Lipids are good  Stress testing planned for further cardiac risk assessment  Chief Complaint   Patient presents with   [address] [facility] Patient Visit     Referred to by PCP for irregular heart beat       HPI:    Barb Layne is a 60y o -year-old male who presents to the cardiology clinic for evaluation    He saw his primary physician in 3/22 with concerns of atrial fibrillation on fitness watch after exercise  After intense exercise his watch reported probable atrial fibrillation and after rest it went back to reporting sinus rhythm  He did not feel any differently during this recording of dysrhythmia  He reports some heartburn at times but no definite exertional chest pain, dizziness or syncope  Prior episode of upper back pain and neck pain related to strenuous upper body exercise  No recurrence on treadmill  Episode of heartburn related to beer drinking  No exertional symptoms  Had done extreme shoulder exercise and push up and had some back pain  No family history of CAD  Quit smoking at 33 years age  Smoker for 15 years  HDL is excellent  Denies recent hospitalizations  No side effects reported  Denies chest pain on exertion  Denies worsening shortness of breath  Denies sustained palpitations  Drinks 1-2 beer/wine daily  The 10-year ASCVD risk score (Aaron Boudreaux et al , 2013) is: 4 9%    Values used to calculate the score:      Age: 60 years      Sex: Male      Is Non- : No      Diabetic: No      Tobacco smoker: No      Systolic Blood Pressure: 799 mmHg      Is BP treated: No      HDL Cholesterol: 79 mg/dL      Total Cholesterol: 201 mg/dL      Current symptoms:  No further chest pain or shortness of breath  Continues to be very active  Exercises on the treadmill on a regular basis  Does still report some heartburn  Cardiology Problem list:  Palpitations and tachycardia:  ?Atrial fib on home monitoring  Covid 19 in March 20    Past history, family history, social history, current medications, vital signs, recent lab and imaging studies and  prior cardiology studies reviewed independently on this visit      BP Readings from Last 4 Encounters:   05/12/22 124/72   03/24/22 122/72   11/15/21 114/83   10/04/21 133/82      Wt Readings from Last 3 Encounters:   05/12/22 75 8 kg (167 lb)   03/24/22 77 1 kg (170 lb)   11/15/21 77 1 kg (170 lb)       Lab Results   Component Value Date    LDLCALC 107 (H) 09/09/2021     Lab Results   Component Value Date    CREATININE 0 79 09/09/2021          ALLERGIES:  No Known Allergies      Current Outpatient Medications:     Multiple Vitamin (MULTI-VITAMIN PO), Take by mouth daily, Disp: , Rfl:       Review of Systems   Constitutional: Negative  HENT: Negative  Eyes: Negative  Respiratory: Negative for cough and shortness of breath  Cardiovascular: Positive for chest pain  Negative for palpitations  Gastrointestinal: Positive for abdominal pain  Endocrine: Negative  Genitourinary: Negative  Musculoskeletal: Positive for back pain  Skin: Negative  Allergic/Immunologic: Negative  Neurological: Negative  Hematological: Does not bruise/bleed easily  Psychiatric/Behavioral: Negative  Past Medical History:   Diagnosis Date    Colon polyp     History of ulcerative colitis        Past Surgical History:   Procedure Laterality Date    COLONOSCOPY      Fiberoptic    NO PAST SURGERIES      MD SHLDR ARTHROSCOP,SURG,W/ROTAT CUFF REPR Left 5/26/2021    Procedure: SHOULDER ARTHROSCOPIC ROTATOR CUFF REPAIR, BICEPS TENODESIS, EXTENSIVE DEBRIDEMENT;  Surgeon: Savanna Reyez MD;  [facility];  Service: Orthopedics       Family History   Problem Relation Age of Onset    Lung cancer Mother     Arthritis Father     Heart disease Family         Cardiac Disorder       Social History   reports that he has quit smoking  He has never used smokeless tobacco  He reports current alcohol use  He reports previous drug use         Objective:     Vitals:    05/12/22 1442   BP: 124/72   Pulse: 81   SpO2: 97%     Wt Readings from Last 3 Encounters:   05/12/22 75 8 kg (167 lb)   03/24/22 77 1 kg (170 lb)   11/15/21 77 1 kg (170 lb)     Pulse Readings from Last 3 Encounters:   05/12/22 81   03/24/22 78   11/15/21 75     BP Readings from Last 3 Encounters:   05/12/22 124/72   03/24/22 122/72   11/15/21 114/83         Physical Exam  Vitals reviewed  Constitutional:       General: He is not in acute distress  HENT:      Head: Normocephalic  Right Ear: External ear normal       Left Ear: External ear normal    Eyes:      General: No scleral icterus  Neck:      Vascular: No carotid bruit  Cardiovascular:      Rate and Rhythm: Normal rate and regular rhythm  Heart sounds: S1 normal and S2 normal  No murmur heard  Pulmonary:      Breath sounds: Normal breath sounds  No wheezing or rhonchi  Abdominal:      General: There is no distension  Musculoskeletal:      Right lower leg: No edema  Left lower leg: No edema  Skin:     General: Skin is warm  Findings: No lesion  Neurological:      General: No focal deficit present  Mental Status: He is alert     Psychiatric:         Mood and Affect: Mood normal          Pertinent Laboratory/Diagnostic Studies:    Laboratory studies reviewed personally by Missy Sheridan MD    BMP:   Lab Results   Component Value Date    SODIUM 136 09/09/2021    K 4 3 09/09/2021     09/09/2021    CO2 28 09/09/2021    BUN 17 09/09/2021    CREATININE 0 79 09/09/2021    GLUC 99 09/09/2021    CALCIUM 10 3 09/09/2021     CBC:  Lab Results   Component Value Date    WBC 9 9 09/09/2021    WBC 5 6 02/25/2017    RBC 5 13 09/09/2021    HGB 15 4 09/09/2021    HGB 16 5 02/25/2017    HCT 46 8 09/09/2021    HCT 49 2 02/25/2017    MCV 91 2 09/09/2021    MCV 92 02/25/2017    MCH 30 0 09/09/2021    MCH 30 7 02/25/2017    RDW 13 1 09/09/2021    RDW 13 4 02/25/2017     09/09/2021     02/25/2017     Coags:    Lipid Profile:   Lab Results   Component Value Date    CHOL 217 (H) 02/25/2017     Lab Results   Component Value Date    HDL 79 09/09/2021     Lab Results   Component Value Date    LDLCALC 107 (H) 09/09/2021     Lab Results   Component Value Date    TRIG 66 09/09/2021      Lab Results   Component Value Date    [id number] 0 798 02/25/2017    TSH 0 81 10/19/2021 Lab Results   Component Value Date    ALT 12 2021    AST 21 2021         Imaging Studies:   No results found  Cardiac testing:   EKG reviewed personally: normal EKG, normal sinus rhythm  Orders Placed This Encounter   Procedures    Stress test only, exercise    POCT ECG           Gato Arnett MD, [facility]    Portions of the record may have been created with voice recognition software  Occasional wrong word or "sound a like" substitutions may have occurred due to the inherent limitations of voice recognition software  Read the chart carefully and recognize, using context, where substitutions have occurred  If you have any questions or concerns about the context, text or information contained within the body of this dictation, please contact myself, the provider, for further clarification  Bailey Calderon MD  2022  3:09 PM  Sign when Signing Visit   [facility]  [address] [address]  Phone#  [phone]  Fax#  [phone]  [address] Cardiology Office Consultation             NAME: Freida Ortiz  AGE: 60 y o  SEX: male   : 1965   MRN: [id number]    DATE: 2022  TIME: 3:09 PM    Assessment and plan:    Paroxysmal tachycardia ([address] )  Stress testing planned for further cardiac risk assessment to assess for stress induced AF  Precordial pain  ASCVD risk is about 5%  Former smoker  Lipids are good  Stress testing planned for further cardiac risk assessment  Chief Complaint   Patient presents with   [facility] Patient Visit     Referred to by PCP for irregular heart beat       HPI:    Freida Ortiz is a 60y o -year-old male who presents to the cardiology clinic for evaluation  He saw his primary physician in 3/22 with concerns of atrial fibrillation on fitness watch after exercise    After intense exercise his watch reported probable atrial fibrillation and after rest it went back to reporting sinus rhythm  He did not feel any differently during this recording of dysrhythmia  He reports some heartburn at times but no definite exertional chest pain, dizziness or syncope  Had done extreme shoulder exercise and push up and had back pain  No family history of CAD  Quit smoking at 33 years age  Smoker for 15 years  HDL is excellent  Denies recent hospitalizations  No side effects reported  Denies chest pain on exertion  Denies worsening shortness of breath  Denies sustained palpitations  Drinks 1-2 beer/wine daily  The 10-year ASCVD risk score (Maranda Bro et al , 2013) is: 4 9%    Values used to calculate the score:      Age: 60 years      Sex: Male      Is Non- : No      Diabetic: No      Tobacco smoker: No      Systolic Blood Pressure: 054 mmHg      Is BP treated: No      HDL Cholesterol: 79 mg/dL      Total Cholesterol: 201 mg/dL      Current symptoms:     Cardiology Problem list:  Palpitations and tachycardia:  ?Atrial fib on home monitoring  Covid 19 in March 20    Past history, family history, social history, current medications, vital signs, recent lab and imaging studies and  prior cardiology studies reviewed independently on this visit  BP Readings from Last 4 Encounters:   05/12/22 124/72   03/24/22 122/72   11/15/21 114/83   10/04/21 133/82      Wt Readings from Last 3 Encounters:   05/12/22 75 8 kg (167 lb)   03/24/22 77 1 kg (170 lb)   11/15/21 77 1 kg (170 lb)       Lab Results   Component Value Date    LDLCALC 107 (H) 09/09/2021     Lab Results   Component Value Date    CREATININE 0 79 09/09/2021          ALLERGIES:  No Known Allergies      Current Outpatient Medications:     Multiple Vitamin (MULTI-VITAMIN PO), Take by mouth daily, Disp: , Rfl:       Review of Systems   Constitutional: Negative  HENT: Negative  Eyes: Negative  Respiratory: Negative for cough and shortness of breath  Cardiovascular: Positive for chest pain   Negative for palpitations  Gastrointestinal: Negative  Endocrine: Negative  Genitourinary: Negative  Musculoskeletal: Positive for arthralgias and back pain  Skin: Negative  Allergic/Immunologic: Negative  Neurological: Negative  Hematological: Does not bruise/bleed easily  Psychiatric/Behavioral: Negative  Past Medical History:   Diagnosis Date    Colon polyp     History of ulcerative colitis        Past Surgical History:   Procedure Laterality Date    COLONOSCOPY      Fiberoptic    NO PAST SURGERIES      PA SHLDR ARTHROSCOP,SURG,W/ROTAT CUFF REPR Left 5/26/2021    Procedure: SHOULDER ARTHROSCOPIC ROTATOR CUFF REPAIR, BICEPS TENODESIS, EXTENSIVE DEBRIDEMENT;  Surgeon: Minog To MD;  Location: [facility];  Service: Orthopedics       Family History   Problem Relation Age of Onset    Lung cancer Mother     Arthritis Father     Heart disease Family         Cardiac Disorder       Social History   reports that he has quit smoking  He has never used smokeless tobacco  He reports current alcohol use  He reports previous drug use  Objective:     Vitals:    05/12/22 1442   BP: 124/72   Pulse: 81   SpO2: 97%     Wt Readings from Last 3 Encounters:   05/12/22 75 8 kg (167 lb)   03/24/22 77 1 kg (170 lb)   11/15/21 77 1 kg (170 lb)     Pulse Readings from Last 3 Encounters:   05/12/22 81   03/24/22 78   11/15/21 75     BP Readings from Last 3 Encounters:   05/12/22 124/72   03/24/22 122/72   11/15/21 114/83         Physical Exam  Vitals reviewed  Constitutional:       General: He is not in acute distress  HENT:      Head: Normocephalic  Right Ear: External ear normal       Left Ear: External ear normal    Eyes:      General: No scleral icterus  Neck:      Vascular: No carotid bruit  Cardiovascular:      Rate and Rhythm: Normal rate and regular rhythm  Heart sounds: S1 normal and S2 normal  No murmur heard  Pulmonary:      Breath sounds: Normal breath sounds   No wheezing or rhonchi  Abdominal:      General: There is no distension  Musculoskeletal:      Right lower leg: No edema  Left lower leg: No edema  Skin:     General: Skin is warm  Findings: No lesion  Neurological:      General: No focal deficit present  Mental Status: He is alert  Psychiatric:         Mood and Affect: Mood normal          Pertinent Laboratory/Diagnostic Studies:    Laboratory studies reviewed personally by Stephanie Culp MD    BMP:   Lab Results   Component Value Date    SODIUM 136 09/09/2021    K 4 3 09/09/2021     09/09/2021    CO2 28 09/09/2021    BUN 17 09/09/2021    CREATININE 0 79 09/09/2021    GLUC 99 09/09/2021    CALCIUM 10 3 09/09/2021     CBC:  Lab Results   Component Value Date    WBC 9 9 09/09/2021    WBC 5 6 02/25/2017    RBC 5 13 09/09/2021    HGB 15 4 09/09/2021    HGB 16 5 02/25/2017    HCT 46 8 09/09/2021    HCT 49 2 02/25/2017    MCV 91 2 09/09/2021    MCV 92 02/25/2017    MCH 30 0 09/09/2021    MCH 30 7 02/25/2017    RDW 13 1 09/09/2021    RDW 13 4 02/25/2017     09/09/2021     02/25/2017     Coags:    Lipid Profile:   Lab Results   Component Value Date    CHOL 217 (H) 02/25/2017     Lab Results   Component Value Date    HDL 79 09/09/2021     Lab Results   Component Value Date    LDLCALC 107 (H) 09/09/2021     Lab Results   Component Value Date    TRIG 66 09/09/2021      Lab Results   Component Value Date    [id number] 0 798 02/25/2017    TSH 0 81 10/19/2021     Lab Results   Component Value Date    ALT 12 09/09/2021    AST 21 09/09/2021         Imaging Studies:   No results found  Cardiac testing:   EKG reviewed personally: normal EKG, normal sinus rhythm  Orders Placed This Encounter   Procedures    Stress test only, exercise    POCT ECG           Kendal Heck MD, [facility]    Portions of the record may have been created with voice recognition software    Occasional wrong word or "sound a like" substitutions may have occurred due to the inherent limitations of voice recognition software  Read the chart carefully and recognize, using context, where substitutions have occurred  If you have any questions or concerns about the context, text or information contained within the body of this dictation, please contact myself, the provider, for further clarification

## 2022-05-12 NOTE — ASSESSMENT & PLAN NOTE
Concerns for atrial fibrillation on exercise on home monitoring with smart watch  No significant underlying risk factors for atrial fibrillation except alcohol use  No symptoms reported  Stress testing planned for further cardiac risk assessment to assess for stress induced AF  Continue home rhythm monitoring and if further concerns for AFib, bring recordings for review

## 2022-06-02 ENCOUNTER — RA CDI HCC (OUTPATIENT)
Dept: OTHER | Facility: HOSPITAL | Age: 57
End: 2022-06-02

## 2022-06-02 NOTE — PROGRESS NOTES
[facility]  coding opportunities       Chart reviewed, no opportunity found: CHART REVIEWED, NO OPPORTUNITY FOUND        Patients Insurance        Commercial Insurance: Dionte Mc

## 2022-06-09 ENCOUNTER — OFFICE VISIT (OUTPATIENT)
Dept: FAMILY MEDICINE CLINIC | Facility: MEDICAL CENTER | Age: 57
End: 2022-06-09
Payer: COMMERCIAL

## 2022-06-09 VITALS
DIASTOLIC BLOOD PRESSURE: 78 MMHG | SYSTOLIC BLOOD PRESSURE: 128 MMHG | HEART RATE: 72 BPM | BODY MASS INDEX: 25.01 KG/M2 | OXYGEN SATURATION: 98 % | WEIGHT: 165 LBS | HEIGHT: 68 IN

## 2022-06-09 DIAGNOSIS — Z00.00 ANNUAL PHYSICAL EXAM: Primary | ICD-10-CM

## 2022-06-09 DIAGNOSIS — Z11.59 NEED FOR HEPATITIS C SCREENING TEST: ICD-10-CM

## 2022-06-09 DIAGNOSIS — Z12.5 SCREENING FOR PROSTATE CANCER: ICD-10-CM

## 2022-06-09 DIAGNOSIS — Z13.1 SCREENING FOR DIABETES MELLITUS (DM): ICD-10-CM

## 2022-06-09 DIAGNOSIS — Z13.220 SCREENING CHOLESTEROL LEVEL: ICD-10-CM

## 2022-06-09 DIAGNOSIS — Z11.4 SCREENING FOR HIV (HUMAN IMMUNODEFICIENCY VIRUS): ICD-10-CM

## 2022-06-09 PROCEDURE — 3725F SCREEN DEPRESSION PERFORMED: CPT | Performed by: STUDENT IN AN ORGANIZED HEALTH CARE EDUCATION/TRAINING PROGRAM

## 2022-06-09 PROCEDURE — 99396 PREV VISIT EST AGE 40-64: CPT | Performed by: STUDENT IN AN ORGANIZED HEALTH CARE EDUCATION/TRAINING PROGRAM

## 2022-06-09 PROCEDURE — 3008F BODY MASS INDEX DOCD: CPT | Performed by: STUDENT IN AN ORGANIZED HEALTH CARE EDUCATION/TRAINING PROGRAM

## 2022-06-09 NOTE — PATIENT INSTRUCTIONS

## 2023-01-10 ENCOUNTER — TELEPHONE (OUTPATIENT)
Dept: FAMILY MEDICINE CLINIC | Facility: MEDICAL CENTER | Age: 58
End: 2023-01-10

## 2023-01-10 NOTE — TELEPHONE ENCOUNTER
Pt has had on and off pain in either of his big toes for months  He thinks it might be gout and they are wondering if he can get a test to see if he has gout  He does not have the pain currently

## 2023-01-18 ENCOUNTER — RA CDI HCC (OUTPATIENT)
Dept: OTHER | Facility: HOSPITAL | Age: 58
End: 2023-01-18

## 2023-01-18 NOTE — PROGRESS NOTES
[facility]  coding opportunities       Chart reviewed, no opportunity found: CHART REVIEWED, NO OPPORTUNITY FOUND        Patients Insurance        Commercial Insurance: Dionte Mc

## 2023-02-23 NOTE — PROGRESS NOTES
PT Re-Evaluation     Today's date: 7/15/2021  Patient name: Jonathan Burden  : 1965  MRN: [id number]  Referring provider: Nick Warren MD  Dx:   Encounter Diagnosis     ICD-10-CM    1  Tear of left supraspinatus tendon  M75 102    2  Injury of tendon of long head of biceps, left, subsequent encounter  S46 102D                   Assessment  Assessment details: Jonathan Burden has attended 14 visits since initiating PT and has demonstrated overall improvement  Mobility and strength has improved with decreased reports of pain  Jonathan Burden has demonstrated an improvement in function as well  Currently, he has made steady progress towards his goals, but continue to remain limited with pulling up pants, drying off, typical LUE usage,  sleeping  At this time, continued physical therapy is recommended in order to address their remaining impairments in effort to further improve function  Impairments: abnormal or restricted ROM, abnormal movement, activity intolerance, impaired physical strength, lacks appropriate home exercise program and pain with function  Understanding of Dx/Px/POC: excellent  Goals  Short Term Goals:  1  Pain decreased 3 subjective ratings in 6 weeks MET  2  PROM within normal limits in 6 weeks PARTIALLY MET  3  Full pain free cervical, wrist, and elbow ROM in 6 weeks MET    Long Term Goals:  1  AROM within normal limits in 12 weeks PARTIALLY MET  2  Patient will be independent with IADLS/ADLS PARTIALLY MET  2    Independent with HEP PARTIALLY MET    Plan  Patient would benefit from: skilled physical therapy  Planned modality interventions: cryotherapy  Planned therapy interventions: therapeutic exercise, stretching, strengthening, patient education, neuromuscular re-education, manual therapy, home exercise program, functional ROM exercises and flexibility  Frequency: 2x week  Duration in weeks: 12  Treatment plan discussed with: patient        Subjective Evaluation    History of Present Illness  Mechanism of injury: Pt is progressing well w/ protocol and has maintained compliance w/ restrictions and precautions  He has done well since discontinuing use of sling, but intermittently requires cues to allow arm to rest at his side  He does remain cautious w/ reaching  Able to wash hair w/ less difficulty  Remains challenged w/ pulling up paints, drying off w/ towel  C/o ongoing sleep disturbance  Intermittent c/o elbow pain    Pain  At best pain ratin  At worst pain ratin    Patient Goals  Patient goals for therapy: decreased pain, increased motion, increased strength, independence with ADLs/IADLs and return to work          Objective     Active Range of Motion   Cervical/Thoracic Spine     Normal active range of motion  Left Shoulder   Flexion: 82 degrees   Abduction: 75 degrees     Passive Range of Motion   Left Shoulder   Flexion: 155 degrees   Abduction: 155 degrees   External rotation 45°: 45 degrees   Internal rotation 45°: 30 degrees     Right Shoulder   Normal passive range of motion    Left Elbow   Normal passive range of motion    Left Wrist   Normal passive range of motion             Precautions: protocol      Manuals 7/15            PROM L shoulder within protocol 10 min                                                   Neuro Re-Ed                                                                                                        Ther Ex 7/15            pulleys 5 min            scap squeezes 5"x20            Table slides flex, scap, ER 10" x10 ea            Supine wand flex, scap, ER 10" x10 ea            Standing wand IR and ext 10' x10                                                   Ther Activity                                       Gait Training                                       Modalities             CP post np Hide Additional Notes?: No Detail Level: Simple

## 2023-05-18 ENCOUNTER — RA CDI HCC (OUTPATIENT)
Dept: OTHER | Facility: HOSPITAL | Age: 58
End: 2023-05-18

## 2023-05-18 NOTE — PROGRESS NOTES
[facility]  coding opportunities       Chart reviewed, no opportunity found: CHART REVIEWED, NO OPPORTUNITY FOUND        Patients Insurance        Commercial Insurance: Dinote Mc

## 2023-05-25 LAB
CHOLEST SERPL-MCNC: 195 MG/DL
CHOLEST/HDLC SERPL: 3.3 (CALC)
EST. AVERAGE GLUCOSE BLD GHB EST-MCNC: 105 MG/DL
EST. AVERAGE GLUCOSE BLD GHB EST-SCNC: 5.8 MMOL/L
HBA1C MFR BLD: 5.3 % OF TOTAL HGB
HCV AB S/CO SERPL IA: 0.05
HCV AB SERPL QL IA: NORMAL
HDLC SERPL-MCNC: 59 MG/DL
HIV 1+2 AB+HIV1 P24 AG SERPL QL IA: NORMAL
LDLC SERPL CALC-MCNC: 110 MG/DL (CALC)
NONHDLC SERPL-MCNC: 136 MG/DL (CALC)
PSA SERPL-MCNC: 0.53 NG/ML
TRIGL SERPL-MCNC: 149 MG/DL

## 2023-05-26 ENCOUNTER — OFFICE VISIT (OUTPATIENT)
Dept: FAMILY MEDICINE CLINIC | Facility: MEDICAL CENTER | Age: 58
End: 2023-05-26

## 2023-05-26 VITALS
WEIGHT: 158 LBS | HEART RATE: 84 BPM | BODY MASS INDEX: 23.95 KG/M2 | SYSTOLIC BLOOD PRESSURE: 126 MMHG | HEIGHT: 68 IN | RESPIRATION RATE: 16 BRPM | DIASTOLIC BLOOD PRESSURE: 78 MMHG | TEMPERATURE: 98 F | OXYGEN SATURATION: 99 %

## 2023-05-26 DIAGNOSIS — M79.674 TOE PAIN, BILATERAL: Primary | ICD-10-CM

## 2023-05-26 DIAGNOSIS — Z71.2 ENCOUNTER TO DISCUSS TEST RESULTS: ICD-10-CM

## 2023-05-26 DIAGNOSIS — M79.675 TOE PAIN, BILATERAL: Primary | ICD-10-CM

## 2023-05-26 NOTE — PROGRESS NOTES
[address]  [place], [place], [address]     dAeola Rdz MRN: [id number] : 1965 Age: 59 y o  Assessment/Plan     1  Toe pain, bilateral    -Patient is an  who wears steel toe boots, bilateral first MTP joint pain  -Discussed possible bunion formation  -Check labs per orders  - Uric acid; Future  - Ambulatory Referral to Podiatry; Future  - CBC and differential; Future  - RF Screen w/ Reflex to Titer; Future  - C-reactive protein; Future  - Antinuclear Antibodies, IFA; Future    2  Encounter to discuss test results    I have reviewed pertinent labs:  Lipid Profile:   Lab Results   Component Value Date    CHOLESTEROL 195 2023    HDL 59 2023    LDLCALC 110 (H) 2023    TRIG 149 2023     The 10-year ASCVD risk score (Chase ALEGRIA, et al , 2019) is: 6 7%    Values used to calculate the score:      Age: 59 years      Sex: Male      Is Non- : No      Diabetic: No      Tobacco smoker: No      Systolic Blood Pressure: 971 mmHg      Is BP treated: No      HDL Cholesterol: 59 mg/dL      Total Cholesterol: 195 mg/dL    Hemoglobin A1C/EST AVG Glucose   Lab Results   Component Value Date     2023    EAG 5 8 2023    HGBA1C 5 3 2023       Component      Latest Ref Rng & Units 2023   PSA, Total      < OR = 4 00 ng/mL 0 53     Component      Latest Ref Rng & Units 2023   HEPATITIS C ANTIBODY      NON-REACTIVE NON-REACTIVE   SIGNAL TO CUT-OFF      <1 00 0 05     Component      Latest Ref Rng & Units 2023   HIV AG/AB, 4th Gen      NON-REACTIVE NON-REACTIVE       Adeola Rdz acknowledged understanding of treatment plan, all questions answered  Subjective     Adeola Rdz is a 59 y o  male who presents with bilateral toe pain   Onset of the symptoms was about a year ago however, patient goes on to mention he works as an  and wears steel toe boots this sort of seem to happen "gradually over time  Precipitating event: none known  Started right toenail left toe as well  Current symptoms include: ability to bear weight, but with some pain and stiffness  Patient mentions the pain is \"not all the time every day\"  Symptoms have gradually worsened  Patient has had no prior foot problems  Evaluation to date: none  Treatment to date: Tart Cherry tablets  Patient offers he does have a family history of unspecified arthritis  The following portions of the patient's history were reviewed and updated as appropriate: allergies, current medications, past family history, past medical history, past social history, past surgical history and problem list      Past Medical History:   Diagnosis Date   • Colon polyp    • History of ulcerative colitis        Allergies   Allergen Reactions   • Other Photosensitivity and Eye Swelling     Patient does not remember which, thinks magnesium for colonoscopy prep          Past Surgical History:   Procedure Laterality Date   • COLONOSCOPY      Fiberoptic   • NO PAST SURGERIES     • [place] SURGICAL ARTHROSCOPY SHOULDER W/ROTATOR CUFF RPR Left 5/26/2021    Procedure: SHOULDER ARTHROSCOPIC ROTATOR CUFF REPAIR, BICEPS TENODESIS, EXTENSIVE DEBRIDEMENT;  Surgeon: Meme Humphery MD;  Location: [facility];  Service: Orthopedics       Family History   Problem Relation Age of Onset   • Lung cancer Mother    • Cancer Mother    • Arthritis Father    • COPD Father    • Heart disease Family         Cardiac Disorder       Social History     Socioeconomic History   • Marital status: /Civil Union     Spouse name: None   • Number of children: None   • Years of education: None   • Highest education level: None   Occupational History   • Occupation:    Tobacco Use   • Smoking status: Former     Years: 10 00     Types: Cigarettes   • Smokeless tobacco: Never   • Tobacco comments:     Quit at [place] Use   • Vaping Use: Never used   Substance and Sexual Activity " "  • Alcohol use: Yes     Alcohol/week: 7 0 standard drinks of alcohol     Types: 2 Glasses of wine, 5 Cans of beer per week     Comment: Social   • Drug use: Not Currently   • Sexual activity: Yes     Partners: Female   Other Topics Concern   • None   Social History Narrative    Drinks coffee     Social Determinants of Health     Financial Resource Strain: Not on file   Food Insecurity: Not on file   Transportation Needs: Not on file   Physical Activity: Not on file   Stress: Not on file   Social Connections: Not on file   Intimate Partner Violence: Not on file   Housing Stability: Not on file       Current Outpatient Medications   Medication Sig Dispense Refill   • Multiple Vitamin (MULTI-VITAMIN PO) Take by mouth daily       No current facility-administered medications for this visit  Review of Systems     As noted in HPI    Objective      /78 (BP Location: Left arm, Patient Position: Sitting, Cuff Size: Adult)   Pulse 84   Temp 98 °F (36 7 °C)   Resp 16   Ht 5' 8\" (1 727 m)   Wt 71 7 kg (158 lb)   SpO2 99%   BMI 24 02 kg/m²     Physical Exam  Vitals reviewed  Constitutional:       General: He is not in acute distress  Appearance: Normal appearance  HENT:      Head: Normocephalic and atraumatic  Eyes:      Conjunctiva/sclera: Conjunctivae normal    Pulmonary:      Effort: Pulmonary effort is normal    Musculoskeletal:      Right foot: Normal range of motion  Left foot: Normal range of motion  Feet:    Feet:      Right foot:      Skin integrity: Skin integrity normal       Left foot:      Skin integrity: Skin integrity normal       Comments: Sensation to light touch intact bilateral lower extremities    Monofilament testing normal      Tenderness first MTP joint bilaterally, no related erythema or tactile warmth    Neurological:      Mental Status: He is alert and oriented to person, place, and time     Psychiatric:         Mood and Affect: Mood normal          Behavior: " "Behavior normal          Thought Content: Thought content normal          Judgment: Judgment normal              Some portions of this record may have been generated with voice recognition software  There may be translation, syntax, or grammatical errors  Occasional wrong word or \"sound-a-like\" substitutions may have occurred due to the inherent limitations of the voice recognition software  Read the chart carefully and recognize, using context, where substations may have occurred  If you have any questions, please contact the dictating provider for clarification or correction, as needed    "

## 2023-06-02 LAB
ANA SER QL IF: NEGATIVE
BASOPHILS # BLD AUTO: 31 CELLS/UL (ref 0–200)
BASOPHILS NFR BLD AUTO: 0.7 %
CRP SERPL-MCNC: 1.7 MG/L
EOSINOPHIL # BLD AUTO: 70 CELLS/UL (ref 15–500)
EOSINOPHIL NFR BLD AUTO: 1.6 %
ERYTHROCYTE [DISTWIDTH] IN BLOOD BY AUTOMATED COUNT: 12.5 % (ref 11–15)
HCT VFR BLD AUTO: 45.3 % (ref 38.5–50)
HGB BLD-MCNC: 14.9 G/DL (ref 13.2–17.1)
LYMPHOCYTES # BLD AUTO: 1175 CELLS/UL (ref 850–3900)
LYMPHOCYTES NFR BLD AUTO: 26.7 %
MCH RBC QN AUTO: 30.4 PG (ref 27–33)
MCHC RBC AUTO-ENTMCNC: 32.9 G/DL (ref 32–36)
MCV RBC AUTO: 92.4 FL (ref 80–100)
MONOCYTES # BLD AUTO: 312 CELLS/UL (ref 200–950)
MONOCYTES NFR BLD AUTO: 7.1 %
NEUTROPHILS # BLD AUTO: 2812 CELLS/UL (ref 1500–7800)
NEUTROPHILS NFR BLD AUTO: 63.9 %
PLATELET # BLD AUTO: 239 THOUSAND/UL (ref 140–400)
PMV BLD REES-ECKER: 10.4 FL (ref 7.5–12.5)
RBC # BLD AUTO: 4.9 MILLION/UL (ref 4.2–5.8)
RHEUMATOID FACT SERPL-ACNC: <14 IU/ML
URATE SERPL-MCNC: 5.7 MG/DL (ref 4–8)
WBC # BLD AUTO: 4.4 THOUSAND/UL (ref 3.8–10.8)

## 2023-06-14 ENCOUNTER — OFFICE VISIT (OUTPATIENT)
Dept: FAMILY MEDICINE CLINIC | Facility: MEDICAL CENTER | Age: 58
End: 2023-06-14
Payer: COMMERCIAL

## 2023-06-14 VITALS
BODY MASS INDEX: 26.07 KG/M2 | HEART RATE: 88 BPM | OXYGEN SATURATION: 98 % | TEMPERATURE: 97.9 F | SYSTOLIC BLOOD PRESSURE: 118 MMHG | HEIGHT: 68 IN | RESPIRATION RATE: 16 BRPM | DIASTOLIC BLOOD PRESSURE: 78 MMHG | WEIGHT: 172 LBS

## 2023-06-14 DIAGNOSIS — Z13.1 SCREENING FOR DIABETES MELLITUS (DM): ICD-10-CM

## 2023-06-14 DIAGNOSIS — Z71.2 ENCOUNTER TO DISCUSS TEST RESULTS: ICD-10-CM

## 2023-06-14 DIAGNOSIS — Z00.00 ANNUAL PHYSICAL EXAM: Primary | ICD-10-CM

## 2023-06-14 DIAGNOSIS — Z13.220 SCREENING CHOLESTEROL LEVEL: ICD-10-CM

## 2023-06-14 PROCEDURE — 99396 PREV VISIT EST AGE 40-64: CPT | Performed by: STUDENT IN AN ORGANIZED HEALTH CARE EDUCATION/TRAINING PROGRAM

## 2023-06-14 NOTE — PROGRESS NOTES
[facility] HEALTH MAINTENANCE OFFICE VISIT  [facility] - [facility] [place]    NAME: Namrata Hinton  AGE: 59 y o  SEX: male  : 1965     DATE: 2023    Assessment and Plan     1  Annual physical exam    2  BMI 26 0-26 9,adult  -     Basic metabolic panel; Future; Expected date: 2024  -     Lipid Panel with Direct LDL reflex; Future; Expected date: 2024    3  Screening for diabetes mellitus (DM)  -     Basic metabolic panel; Future; Expected date: 2024    4  Screening cholesterol level  -     Lipid Panel with Direct LDL reflex; Future; Expected date: 2024    5   Encounter to discuss test results    -I have reviewed pertinent labs:  Lipid Profile:   Lab Results   Component Value Date    CHOLESTEROL 195 2023    HDL 59 2023    LDLCALC 110 (H) 2023    TRIG 149 2023     The 10-year ASCVD risk score (Chase ALEGRIA, et al , 2019) is: 6%    Values used to calculate the score:      Age: 59 years      Sex: Male      Is Non- : No      Diabetic: No      Tobacco smoker: No      Systolic Blood Pressure: 981 mmHg      Is BP treated: No      HDL Cholesterol: 59 mg/dL      Total Cholesterol: 195 mg/dL    Hemoglobin A1C/EST AVG Glucose   Lab Results   Component Value Date     2023    EAG 5 8 2023    HGBA1C 5 3 2023     Component      Latest Ref Rng & Units 10/17/2019   PSA, Total      < OR = 4 00 ng/mL 0 6       · Patient Counseling:   · Nutrition: Stressed importance of a well balanced diet, moderation of sodium/saturated fat, caloric balance and sufficient intake of fiber  · Exercise: Stressed the importance of regular exercise with a goal of 150 minutes per week  · Dental Health: Discussed daily flossing and brushing and regular dental visits   · Alcohol Use:  Recommended moderation of alcohol intake  · Injury Prevention: Discussed Safety Belts, Safety Helmets, and Smoke Detectors  · Sees "Dermatology for skin checks, advised about sunscreen   · Immunizations reviewed:  · Discussed Shingrix vaccination series  · Tetanus booster up-to-date  · Discussed COVID-19 bivalent vaccine, patient will upload his COVID-19 vaccine card to [facility] for review  · Discussed benefits of:    · PSA recent   · Hep C and HIV negative recently  · 9/8/2021 colonoscopy  IMPRESSION:  1  Multiple pseudopolyps noted in the left and mid colon  2  Random biopsies obtained  3  There is no evidence of colitis present  4  There is no suspicious lesions noted      RECOMMENDATION:  Await pathology results  Follow up with me in clinic  Repeat colonoscopy in 2 years due to a personal history of colon polyps and inflammatory bowel disease  BMI Counseling: Body mass index is 26 15 kg/m²  Discussed with patient's BMI with him  BMI Counseling: Body mass index is 26 15 kg/m²  The BMI is above normal  Nutrition recommendations include consuming healthier snacks  Exercise recommendations include exercising 3-5 times per week  Rationale for BMI follow-up plan is due to patient being overweight or obese  Depression Screening and Follow-up Plan: Patient was screened for depression during today's encounter  They screened negative with a PHQ-2 score of 0  Follow-up in 1 year and sooner as needed    Chief Complaint     Chief Complaint   Patient presents with   • Annual Exam       History of Present Illness     HPI    Well Adult Physical   Patient here for a comprehensive physical exam       Diet and Physical Activity  Diet: overall well balanced diet but, does enjoy desserts   Exercise: daily      Depression Screen  PHQ-2/9 Depression Screening    Little interest or pleasure in doing things: 0 - not at all  Feeling down, depressed, or hopeless: 0 - not at all  PHQ-2 Score: 0  PHQ-2 Interpretation: Negative depression screen          General Health  Hearing: \"I'd say 60% gone I work in [employer] with tons of loud noise\", defers audiology " screening at this time   Vision: most recent eye exam >1 year and wears glasses and contacts  Dental: regular dental visits, brushes teeth two-four times daily and flosses teeth occasionally    Reproductive Health  No issues       The following portions of the patient's history were reviewed and updated as appropriate: allergies, current medications, past family history, past medical history, past social history, past surgical history and problem list     Review of Systems     Review of Systems     As noted in HPI     Past Medical History     Past Medical History:   Diagnosis Date   • Colon polyp    • History of ulcerative colitis        Past Surgical History     Past Surgical History:   Procedure Laterality Date   • COLONOSCOPY      Fiberoptic   • NO PAST SURGERIES     • [place] SURGICAL ARTHROSCOPY SHOULDER W/ROTATOR CUFF RPR Left 5/26/2021    Procedure: SHOULDER ARTHROSCOPIC ROTATOR CUFF REPAIR, BICEPS TENODESIS, EXTENSIVE DEBRIDEMENT;  Surgeon: Emanuel Castillo MD;  Location: [facility];  Service: Orthopedics       Social History     Social History     Socioeconomic History   • Marital status: /Civil Union     Spouse name: None   • Number of children: None   • Years of education: None   • Highest education level: None   Occupational History   • Occupation:    Tobacco Use   • Smoking status: Former     Years: 10 00     Types: Cigarettes   • Smokeless tobacco: Never   • Tobacco comments:     Quit at [employer] [facility] Use   • Vaping Use: Never used   Substance and Sexual Activity   • Alcohol use:  Yes     Alcohol/week: 7 0 standard drinks of alcohol     Types: 2 Glasses of wine, 5 Cans of beer per week     Comment: Social   • Drug use: Not Currently   • Sexual activity: Yes     Partners: Female   Other Topics Concern   • None   Social History Narrative    Drinks coffee     Social Determinants of Health     Financial Resource Strain: Not on file   Food Insecurity: Not on file   Transportation Needs: Not on "file   Physical Activity: Not on file   Stress: Not on file   Social Connections: Not on file   Intimate Partner Violence: Not on file   Housing Stability: Not on file       Family History     Family History   Problem Relation Age of Onset   • Lung cancer Mother    • Cancer Mother    • Arthritis Father    • COPD Father    • Heart disease Family         Cardiac Disorder       Current Medications       Current Outpatient Medications:   •  Multiple Vitamin (MULTI-VITAMIN PO), Take by mouth daily, Disp: , Rfl:      Allergies     Allergies   Allergen Reactions   • Other Photosensitivity and Eye Swelling     Patient does not remember which, thinks magnesium for colonoscopy prep  Objective     /78 (BP Location: Left arm, Patient Position: Sitting, Cuff Size: Adult)   Pulse 88   Temp 97 9 °F (36 6 °C)   Resp 16   Ht 5' 8\" (1 727 m)   Wt 78 kg (172 lb)   SpO2 98%   BMI 26 15 kg/m²      Physical Exam  Vitals reviewed  Constitutional:       General: He is not in acute distress  Appearance: Normal appearance  HENT:      Head: Normocephalic and atraumatic  Right Ear: External ear normal       Left Ear: External ear normal       Nose: Nose normal       Mouth/Throat:      Mouth: Mucous membranes are moist       Pharynx: Oropharynx is clear  Eyes:      Extraocular Movements: Extraocular movements intact  Conjunctiva/sclera: Conjunctivae normal       Pupils: Pupils are equal, round, and reactive to light  Cardiovascular:      Rate and Rhythm: Normal rate and regular rhythm  Pulses: Normal pulses  Heart sounds: Normal heart sounds  Pulmonary:      Effort: Pulmonary effort is normal       Breath sounds: Normal breath sounds  Abdominal:      General: Abdomen is flat  Bowel sounds are normal       Palpations: Abdomen is soft  Tenderness: There is no abdominal tenderness  Musculoskeletal:      Cervical back: Neck supple  Right lower leg: No edema        Left lower leg: No " edema    Lymphadenopathy:      Cervical: No cervical adenopathy  Skin:     General: Skin is warm and dry  Neurological:      Mental Status: He is alert and oriented to person, place, and time  Psychiatric:         Mood and Affect: Mood normal          Behavior: Behavior normal          Thought Content:  Thought content normal          Judgment: Judgment normal                Enedelia Evangelista DO  [facility]

## 2023-11-01 ENCOUNTER — PREP FOR PROCEDURE (OUTPATIENT)
Dept: GASTROENTEROLOGY | Facility: CLINIC | Age: 58
End: 2023-11-01

## 2023-11-01 ENCOUNTER — TELEPHONE (OUTPATIENT)
Dept: GASTROENTEROLOGY | Facility: CLINIC | Age: 58
End: 2023-11-01

## 2023-11-01 DIAGNOSIS — K63.5 POLYP OF COLON, UNSPECIFIED PART OF COLON, UNSPECIFIED TYPE: Primary | ICD-10-CM

## 2023-11-01 NOTE — TELEPHONE ENCOUNTER
11/01/23  Screened by: Oscar Babin    Referring Provider ameya    Pre- Screening: There is no height or weight on file to calculate BMI. Has patient been referred for a routine screening Colonoscopy? yes  Is the patient between 43-73 years old? yes      Previous Colonoscopy yes   If yes:    Date: 2021    Facility: [facility]    Reason: Screening      SCHEDULING STAFF: If the patient is between 45yrs-49yrs, please advise patient to confirm benefits/coverage with their insurance company for a routine screening colonoscopy, some insurance carriers will only cover at [address] [place] [address] or older. If the patient is over 66years old, please schedule an office visit. Does the patient want to see a Gastroenterologist prior to their procedure OR are they having any GI symptoms? no    Has the patient been hospitalized or had abdominal surgery in the past 6 months? no    Does the patient use supplemental oxygen? no    Does the patient take Coumadin, Lovenox, Plavix, Elliquis, Xarelto, or other blood thinning medication? no    Has the patient had a stroke, cardiac event, or stent placed in the past year? no    SCHEDULING STAFF: If patient answers NO to above questions, then schedule procedure. If patient answers YES to above questions, then schedule office appointment. If patient is between 45yrs - 49yrs, please advise patient that we will have to confirm benefits & coverage with their insurance company for a routine screening colonoscopy.

## 2023-11-21 ENCOUNTER — ANESTHESIA (OUTPATIENT)
Dept: ANESTHESIOLOGY | Facility: AMBULATORY SURGERY CENTER | Age: 58
End: 2023-11-21

## 2023-11-21 ENCOUNTER — ANESTHESIA EVENT (OUTPATIENT)
Dept: ANESTHESIOLOGY | Facility: AMBULATORY SURGERY CENTER | Age: 58
End: 2023-11-21

## 2023-12-04 RX ORDER — SODIUM CHLORIDE, SODIUM LACTATE, POTASSIUM CHLORIDE, CALCIUM CHLORIDE 600; 310; 30; 20 MG/100ML; MG/100ML; MG/100ML; MG/100ML
125 INJECTION, SOLUTION INTRAVENOUS CONTINUOUS
Status: CANCELLED | OUTPATIENT
Start: 2023-12-04

## 2023-12-05 ENCOUNTER — ANESTHESIA EVENT (OUTPATIENT)
Dept: GASTROENTEROLOGY | Facility: AMBULATORY SURGERY CENTER | Age: 58
End: 2023-12-05

## 2023-12-05 ENCOUNTER — ANESTHESIA (OUTPATIENT)
Dept: GASTROENTEROLOGY | Facility: AMBULATORY SURGERY CENTER | Age: 58
End: 2023-12-05

## 2023-12-05 ENCOUNTER — HOSPITAL ENCOUNTER (OUTPATIENT)
Dept: GASTROENTEROLOGY | Facility: AMBULATORY SURGERY CENTER | Age: 58
Discharge: HOME | End: 2023-12-05
Payer: COMMERCIAL

## 2023-12-05 VITALS
OXYGEN SATURATION: 95 % | WEIGHT: 163 LBS | HEART RATE: 69 BPM | HEIGHT: 68 IN | RESPIRATION RATE: 18 BRPM | DIASTOLIC BLOOD PRESSURE: 65 MMHG | SYSTOLIC BLOOD PRESSURE: 130 MMHG | BODY MASS INDEX: 24.71 KG/M2 | TEMPERATURE: 97.5 F

## 2023-12-05 DIAGNOSIS — Z87.19 HISTORY OF ULCERATIVE COLITIS: Primary | ICD-10-CM

## 2023-12-05 DIAGNOSIS — K63.5 POLYP OF COLON, UNSPECIFIED PART OF COLON, UNSPECIFIED TYPE: ICD-10-CM

## 2023-12-05 PROCEDURE — 88305 TISSUE EXAM BY PATHOLOGIST: CPT | Performed by: PATHOLOGY

## 2023-12-05 PROCEDURE — 45380 COLONOSCOPY AND BIOPSY: CPT | Performed by: INTERNAL MEDICINE

## 2023-12-05 PROCEDURE — 45385 COLONOSCOPY W/LESION REMOVAL: CPT | Performed by: INTERNAL MEDICINE

## 2023-12-05 RX ORDER — PROPOFOL 10 MG/ML
INJECTION, EMULSION INTRAVENOUS AS NEEDED
Status: DISCONTINUED | OUTPATIENT
Start: 2023-12-05 | End: 2023-12-05

## 2023-12-05 RX ORDER — SODIUM CHLORIDE, SODIUM LACTATE, POTASSIUM CHLORIDE, CALCIUM CHLORIDE 600; 310; 30; 20 MG/100ML; MG/100ML; MG/100ML; MG/100ML
125 INJECTION, SOLUTION INTRAVENOUS CONTINUOUS
Status: DISCONTINUED | OUTPATIENT
Start: 2023-12-05 | End: 2023-12-09 | Stop reason: HOSPADM

## 2023-12-05 RX ADMIN — PROPOFOL 20 MG: 10 INJECTION, EMULSION INTRAVENOUS at 11:08

## 2023-12-05 RX ADMIN — PROPOFOL 50 MG: 10 INJECTION, EMULSION INTRAVENOUS at 11:05

## 2023-12-05 RX ADMIN — SODIUM CHLORIDE, SODIUM LACTATE, POTASSIUM CHLORIDE, CALCIUM CHLORIDE: 600; 310; 30; 20 INJECTION, SOLUTION INTRAVENOUS at 10:59

## 2023-12-05 RX ADMIN — PROPOFOL 50 MG: 10 INJECTION, EMULSION INTRAVENOUS at 11:26

## 2023-12-05 RX ADMIN — PROPOFOL 50 MG: 10 INJECTION, EMULSION INTRAVENOUS at 11:11

## 2023-12-05 RX ADMIN — PROPOFOL 50 MG: 10 INJECTION, EMULSION INTRAVENOUS at 11:22

## 2023-12-05 RX ADMIN — PROPOFOL 100 MG: 10 INJECTION, EMULSION INTRAVENOUS at 11:03

## 2023-12-05 RX ADMIN — PROPOFOL 30 MG: 10 INJECTION, EMULSION INTRAVENOUS at 11:07

## 2023-12-05 RX ADMIN — PROPOFOL 50 MG: 10 INJECTION, EMULSION INTRAVENOUS at 11:19

## 2023-12-05 RX ADMIN — PROPOFOL 50 MG: 10 INJECTION, EMULSION INTRAVENOUS at 11:13

## 2023-12-05 RX ADMIN — PROPOFOL 50 MG: 10 INJECTION, EMULSION INTRAVENOUS at 11:15

## 2023-12-05 NOTE — ANESTHESIA PREPROCEDURE EVALUATION
Procedure:  COLONOSCOPY    Relevant Problems   ANESTHESIA (within normal limits)      CARDIO   (+) Arthralgia of left acromioclavicular joint   (+) Paroxysmal tachycardia (HCC)      NEURO/PSYCH   (+) Chronic left shoulder pain      Other   (+) History of ulcerative colitis        Physical Exam    Airway    Mallampati score: II  TM Distance: >3 FB  Neck ROM: full     Dental   No notable dental hx     Cardiovascular      Pulmonary      Other Findings        Anesthesia Plan  ASA Score- 2     Anesthesia Type- IV sedation with anesthesia with ASA Monitors. Additional Monitors:     Airway Plan:            Plan Factors-Exercise tolerance (METS): >4 METS. Chart reviewed. EKG reviewed. Existing labs reviewed. Patient summary reviewed. Patient is not a current smoker. Induction- intravenous. Postoperative Plan-     Informed Consent- Anesthetic plan and risks discussed with patient. I personally reviewed this patient with the CRNA. Discussed and agreed on the Anesthesia Plan with the CRNA. Sharron Goodman

## 2023-12-05 NOTE — INTERVAL H&P NOTE
H&P reviewed. After examining the patient I find no changes in the patients condition since the H&P had been written.     Vitals:    12/05/23 1006   BP: 148/77   Pulse: 67   Resp: 18   Temp: 97.5 °F (36.4 °C)   SpO2: 98%

## 2023-12-05 NOTE — H&P
History and Physical - [facility]  Enrico Saavedra 59 y.o. male MRN: [id number]                  HPI: Enrico Saavedra is a 59y.o. year old male who presents for surveillance colonoscopy for history of ulcerative colitis for approximately 50 years. Patient is asymptomatic. REVIEW OF SYSTEMS: Per the HPI, and otherwise unremarkable. Historical Information   Past Medical History:   Diagnosis Date    Colon polyp     History of ulcerative colitis      Past Surgical History:   Procedure Laterality Date    COLONOSCOPY      Fiberoptic    [place] SURGICAL ARTHROSCOPY SHOULDER W/ROTATOR CUFF RPR Left 05/26/2021    Procedure: SHOULDER ARTHROSCOPIC ROTATOR CUFF REPAIR, BICEPS TENODESIS, EXTENSIVE DEBRIDEMENT;  Surgeon: Batool Sheffield MD;  Location: [facility];  Service: Orthopedics     Social History   Social History     Substance and Sexual Activity   Alcohol Use Yes    Alcohol/week: 7.0 standard drinks of alcohol    Types: 2 Glasses of wine, 5 Cans of beer per week    Comment: Social     Social History     Substance and Sexual Activity   Drug Use Yes    Types: Marijuana    Comment: occasionaly     Social History     Tobacco Use   Smoking Status Former    Years: 10.00    Types: Cigarettes, Cigars   Smokeless Tobacco Never   Tobacco Comments    Quit at 26. Cigar on rare occasion     Family History   Problem Relation Age of Onset    Lung cancer Mother     Cancer Mother     Arthritis Father     COPD Father     Heart disease Family         Cardiac Disorder       Meds/Allergies       Current Outpatient Medications:     Multiple Vitamin (MULTI-VITAMIN PO)    Current Facility-Administered Medications:     lactated ringers infusion, 125 mL/hr, Intravenous, Continuous    Allergies   Allergen Reactions    Other Photosensitivity and Eye Swelling     Patient does not remember which, thinks magnesium for colonoscopy prep.         Objective     /77   Pulse 67   Temp 97.5 °F (36.4 °C) (Temporal) Resp 18   Ht 5' 8" (1.727 m)   Wt 73.9 kg (163 lb)   SpO2 98%   BMI 24.78 kg/m²       PHYSICAL EXAM    Gen: NAD  Head: NCAT  CV: RRR  CHEST: Clear  ABD: soft, NT/ND  EXT: no edema      ASSESSMENT/PLAN:  This is a 59y.o. year old male here for screening colonoscopy, and he is stable and optimized for his procedure.

## 2023-12-05 NOTE — ANESTHESIA POSTPROCEDURE EVALUATION
Post-Op Assessment Note    CV Status:  Stable  Pain Score: 0    Pain management: adequate       Mental Status:  Alert and awake   Hydration Status:  Euvolemic   PONV Controlled:  Controlled   Airway Patency:  Patent     Post Op Vitals Reviewed: Yes      Staff: CRNA               BP   113/57   Temp   98.0   Pulse  69   Resp   18   SpO2   98

## 2023-12-05 NOTE — DISCHARGE SUMMARY
Discharge Summary - Jerrell Medina 59 y.o. male MRN: [id number]    Unit/Bed#:  Encounter: [id number]    Admission Date: 12/5/2023    Admitting Diagnosis: Polyp of colon, unspecified part of colon, unspecified type [K63.5]    HPI: Longstanding history of ulcerative colitis. Patient is asymptomatic. Surveillance colonoscopy was done    Procedures Performed: No orders of the defined types were placed in this encounter. Summary of Hospital Course: Tolerated procedure well    Significant Findings, Care, Treatment and Services Provided: 3.4 colitis noted in the colon however proximal sigmoidal, mid sigmoid and distal descending colon showed inflammation and erythema with exudate. Complications: None    Discharge Diagnosis: See above    Medical Problems       Resolved Problems  Date Reviewed: 12/5/2023   None         Condition at Discharge: good         Discharge instructions/Information to patient and family:   See after visit summary for information provided to patient and family. Provisions for Follow-Up Care:  See after visit summary for information related to follow-up care and any pertinent home health orders.       PCP: Meghna Andres DO    Disposition: Home

## 2023-12-08 PROCEDURE — 88305 TISSUE EXAM BY PATHOLOGIST: CPT | Performed by: PATHOLOGY

## 2023-12-13 ENCOUNTER — TELEPHONE (OUTPATIENT)
Dept: GASTROENTEROLOGY | Facility: CLINIC | Age: 58
End: 2023-12-13

## 2023-12-13 ENCOUNTER — NURSE TRIAGE (OUTPATIENT)
Age: 58
End: 2023-12-13

## 2023-12-13 NOTE — TELEPHONE ENCOUNTER
----- Message from Dutch Gaucher, MD sent at 12/12/2023  8:10 PM EST -----  There is inflammation in sigmoid colon only. Everything else is negative. May need medicine. O/V after stool studies ordered.

## 2023-12-13 NOTE — TELEPHONE ENCOUNTER
Reason for Disposition  • Caller has already spoken with another triager and has no further questions    Protocols used: No Contact or Duplicate Contact Call-ADULT-[place]
Regarding: sigmoid results  ----- Message from Freida Jeter sent at 12/13/2023 10:35 AM EST -----  Pt called for his sigmoid results. He said he has a hard time hearing while at work so he stated it is ok to leave a detailed message if he cannot answer. Please reach out to pt.
details

## 2023-12-13 NOTE — TELEPHONE ENCOUNTER
Pt. Wife returned call, reviewed biopsy results and recommendation, pt. To be Made aware of lab work ordered by spouse, pt. Spouse to have pt.  Complete stool study and blood work completed as ordered, no further review needed

## 2023-12-20 ENCOUNTER — TELEPHONE (OUTPATIENT)
Dept: GASTROENTEROLOGY | Facility: CLINIC | Age: 58
End: 2023-12-20

## 2023-12-20 NOTE — TELEPHONE ENCOUNTER
----- Message from Marlene Henry LPN sent at 12/15/2023  7:35 AM EST -----  Nida Palomares RN         12/13/23 12:31 PM  Note     Pt. Wife returned call, reviewed biopsy results and recommendation, pt. To be Made aware of lab work ordered by spouse, pt. Spouse to have pt. Complete stool study and blood work completed as ordered, no further review needed     Please call patient to schedule f/u appt after stool studies. Thank you

## 2024-01-12 ENCOUNTER — NURSE TRIAGE (OUTPATIENT)
Age: 59
End: 2024-01-12

## 2024-01-12 NOTE — TELEPHONE ENCOUNTER
"Pts wife calling in for lab results, please review lab and stool test for pt- they were done at [facility] but are uploaded into chart. Thank you     Reason for Disposition   Nursing judgment    Answer Assessment - Initial Assessment Questions  1. REASON FOR CALL or QUESTION: \"What is your reason for calling today?\" or \"How can I best help you?\" or \"What question do you have that I can help answer?\"      Labs    Protocols used: Information Only Call - No Triage-ADULT-[place]    "

## 2024-01-12 NOTE — TELEPHONE ENCOUNTER
I called patient on both home phone number and cell phone number listed.  No answer.  Left voicemail on both.  His fecal calprotectin was slightly elevated at 395.  Dr. Small recommended office visit based on results.    Clerical -please call patient to schedule office visit with Dr. Small soon as possible.    Dr. Small -fecal calprotectin elevated at 395.  Does not appear he is on any treatment currently for ulcerative colitis.  If you would like to start him on medicine prior to office visit with you please let us know.

## 2024-02-02 ENCOUNTER — TELEPHONE (OUTPATIENT)
Dept: GASTROENTEROLOGY | Facility: CLINIC | Age: 59
End: 2024-02-02

## 2024-02-02 ENCOUNTER — OFFICE VISIT (OUTPATIENT)
Dept: GASTROENTEROLOGY | Facility: CLINIC | Age: 59
End: 2024-02-02
Payer: COMMERCIAL

## 2024-02-02 VITALS
BODY MASS INDEX: 25.31 KG/M2 | WEIGHT: 167 LBS | SYSTOLIC BLOOD PRESSURE: 128 MMHG | HEIGHT: 68 IN | HEART RATE: 73 BPM | DIASTOLIC BLOOD PRESSURE: 89 MMHG

## 2024-02-02 DIAGNOSIS — Z87.19 HISTORY OF ULCERATIVE COLITIS: Primary | ICD-10-CM

## 2024-02-02 PROCEDURE — 99213 OFFICE O/P EST LOW 20 MIN: CPT | Performed by: INTERNAL MEDICINE

## 2024-02-02 RX ORDER — BUDESONIDE 9 MG/1
9 TABLET, FILM COATED, EXTENDED RELEASE ORAL DAILY
Qty: 30 TABLET | Refills: 3 | Status: SHIPPED | OUTPATIENT
Start: 2024-02-02

## 2024-02-02 NOTE — TELEPHONE ENCOUNTER
Pt needs appt with [facility] team. Can you please call them to schedule an appt per Dr Tate ? Thanks Syeda

## 2024-02-02 NOTE — PROGRESS NOTES
[facility] - Outpatient Follow-up Note  Hi Norman 58 y.o. male MRN: [id number]  Encounter: [id number]          ASSESSMENT AND PLAN:      1. History of ulcerative colitis  Longstanding inflammatory bowel disease with active focal colitis on most recent surveillance colonoscopy off all medication.  We had a long discussion.  Is not clear to me if this truly represents ulcerative colitis or Crohn's disease.  In any case though he is symptomatically in remission, ideally we would like to achieve mucosal healing with medication.  He is receptive to this.  I am not sure what he was treated with in the past as records seem to be scant.  I will give him a course of budesonide and evaluate further as below.  Will have him see one of our IBD specialist Dr. Holcomb or Dr. Kaur for their input asked to most appropriate long-term option    - budesonide 9 mg TB24; Take 9 mg by mouth daily  Dispense: 30 tablet; Refill: 3  - C-reactive protein; Future  - Hepatitis B surface antigen; Future  - Hepatitis B core antibody, total; Future  - Hep B Surface Ab, Ql; Future  - Quantiferon TB Gold Plus Assay; Future  - CBC; Future  - Inflammatory Bowel Disease-IBD; Future    ______________________________________________________________________    SUBJECTIVE: Patient diagnosed with ulcerative colitis at age 7.  Was hospitalized at that time for an extended period and treated with steroids.  Has done well for many years off all medication.  Several years ago reportedly was tried on a medication which he did not tolerate and experienced rectal bleeding after starting it so discontinued.  Unclear what this was though may have been mesalamine.  Most recent surveillance colonoscopy in December 2023 revealed active chronic colitis in the sigmoid with a number of pseudopolyps throughout the colon but normal mucosa but the proximally and distally.  He is completely asymptomatic with no abdominal pain, no  "diarrhea, no blood in his stool, no fever or chills, jaundice or rash, nausea or vomiting, arthralgias, aphthous ulcers.      REVIEW OF SYSTEMS:    ROS Answers submitted by the patient for this visit:  Abdominal Pain Questionnaire (Submitted on 2/1/2024)  Chief Complaint: Abdominal pain  Progression since onset: unchanged  Pain - numeric: 0/10  arthralgias: No  belching: No  Aggravated by: eating  Relieved by: bowel movements  Diagnostic workup: lower endoscopy        Historical Information   Past Medical History:   Diagnosis Date    Colon polyp     History of ulcerative colitis      Past Surgical History:   Procedure Laterality Date    COLONOSCOPY      Fiberoptic    [place] SURGICAL ARTHROSCOPY SHOULDER W/ROTATOR CUFF RPR Left 05/26/2021    Procedure: SHOULDER ARTHROSCOPIC ROTATOR CUFF REPAIR, BICEPS TENODESIS, EXTENSIVE DEBRIDEMENT;  Surgeon: Vivek Hurst MD;  Location: [facility];  Service: Orthopedics     Social History   Social History     Substance and Sexual Activity   Alcohol Use Yes    Alcohol/week: 7.0 standard drinks of alcohol    Types: 2 Glasses of wine, 5 Cans of beer per week    Comment: Social     Social History     Substance and Sexual Activity   Drug Use Not Currently    Types: Marijuana    Comment: occasionaly     Social History     Tobacco Use   Smoking Status Former    Types: Cigarettes, Cigars   Smokeless Tobacco Never   Tobacco Comments    Quit at 33. Cigar on rare occasion     Family History   Problem Relation Age of Onset    Lung cancer Mother     Cancer Mother     Arthritis Father     COPD Father     Heart disease Family         Cardiac Disorder       Meds/Allergies       Current Outpatient Medications:     budesonide 9 mg TB24    Multiple Vitamin (MULTI-VITAMIN PO)    Allergies   Allergen Reactions    Other Photosensitivity and Eye Swelling     Patient does not remember which, thinks magnesium for colonoscopy prep.            Objective     Blood pressure 128/89, pulse 73, height 5' 8\" " (1.727 m), weight 75.8 kg (167 lb). Body mass index is 25.39 kg/m².      PHYSICAL EXAM:      Physical Exam  Vitals and nursing note reviewed.   Constitutional:       General: He is not in acute distress.  HENT:      Head: Normocephalic and atraumatic.      Mouth/Throat:      Mouth: Mucous membranes are moist.   Eyes:      General: No scleral icterus.     Pupils: Pupils are equal, round, and reactive to light.   Cardiovascular:      Rate and Rhythm: Normal rate and regular rhythm.   Pulmonary:      Effort: Pulmonary effort is normal. No respiratory distress.   Abdominal:      General: There is no distension.      Palpations: Abdomen is soft.      Tenderness: There is no abdominal tenderness. There is no guarding or rebound.   Musculoskeletal:         General: Normal range of motion.      Cervical back: Normal range of motion and neck supple.      Right lower leg: No edema.      Left lower leg: No edema.   Skin:     General: Skin is warm and dry.   Neurological:      General: No focal deficit present.      Mental Status: He is alert and oriented to person, place, and time.   Psychiatric:         Mood and Affect: Mood normal.         Behavior: Behavior normal.          Lab Results:   No visits with results within 1 Day(s) from this visit.   Latest known visit with results is:   Hospital Outpatient Visit on 12/05/2023   Component Date Value    Case Report 12/05/2023                      Value:Surgical Pathology Report                         Case: [id number]                                   Authorizing Provider:  Teresa Small MD      Collected:           12/05/2023 1110              Ordering Location:     [facility] Received:            12/06/2023 0809                                     Pray                                                                  Pathologist:           Huey Bella MD                                                            Specimens:   A) - Large Intestine,  Right/Ascending Colon, cold bx ascending random hx of UC                      B) - Large Intestine, Transverse Colon, cold bx mid transverse random hx of UC                      C) - Large Intestine, Transverse Colon, cold snare/cold bx mid transverse polyp x2                  D) - Large Intestine, Left/Descending Colon, cold bx desending random hx of UC                      E) - Large Intestine, Sigmoid Colon, cold bx proximal/mid sigmoid random erythema hx                                          of uc                                                                                               F) - Rectum, cold bx mid/distal rectum random hx of UC thickened fold                      Final Diagnosis 12/05/2023                      Value:This result contains rich text formatting which cannot be displayed here.    Note 12/05/2023                      Value:This result contains rich text formatting which cannot be displayed here.    Additional Information 12/05/2023                      Value:This result contains rich text formatting which cannot be displayed here.    Synoptic Checklist 12/05/2023                      Value:                            COLON/RECTUM POLYP FORM - GI - All Specimens                                                                                     :    Other                                                         :    hyperplastic colon mucosa      Gross Description 12/05/2023                      Value:This result contains rich text formatting which cannot be displayed here.    Clinical Information 12/05/2023                      Value:Polyp of colon, unspecified part of colon, unspecified type.  Longstanding history of ulcerative colitis for approximately 15 years.  Patient is high risk for colon cancer.    · Performed multiple forceps biopsies in the mid ascending colon, distal ascending colon, mid transverse colon, mid descending colon and distal descending colon for dysplasia  screening  · Abnormal mucosa with loss of vascular pattern in the mid transverse colon, distal transverse colon, proximal descending colon, mid descending colon, proximal sigmoid colon and mid sigmoid colon. Multiple pseudopolyps noted in these areas.  · Edematous, erythematous and hemorrhagic mucosa in the distal descending colon, proximal sigmoid colon and mid sigmoid colon, consistent with ulcerative colitis; performed cold forceps biopsy. Significant erythema inflammation and exudate noted in the distal descending, proximal and mid sigmoid colon.  Biopsies taken.  · Two benign-appearing pseudopolyps measuring 10-19 mm in the mid                           transverse colon; performed complete en bloc removal by cold forceps biopsy; completely removed in piecemeal fashion by cold snare and retrieved specimen  · Nodular and scarred mucosa in the rectum, consistent with IBD. Patchy areas of flat formation in the distal rectum noted.  These were scattered.  Biopsies taken.  Importance of these lesions are unclear at this time.             Radiology Results:   No results found.

## 2024-02-05 NOTE — TELEPHONE ENCOUNTER
Called patient, reached voicemail, left message to call back to schedule with Dr. Kaur or Dr. Holcomb.

## 2024-02-07 NOTE — TELEPHONE ENCOUNTER
I spoke with the patient. Scheduled 3/7 with Dr. Vincent Ramirez [facility]. Address provided.

## 2024-02-26 ENCOUNTER — TELEPHONE (OUTPATIENT)
Dept: GASTROENTEROLOGY | Facility: CLINIC | Age: 59
End: 2024-02-26

## 2024-03-07 ENCOUNTER — TELEPHONE (OUTPATIENT)
Dept: GASTROENTEROLOGY | Facility: CLINIC | Age: 59
End: 2024-03-07

## 2024-03-07 ENCOUNTER — CONSULT (OUTPATIENT)
Dept: GASTROENTEROLOGY | Facility: CLINIC | Age: 59
End: 2024-03-07
Payer: COMMERCIAL

## 2024-03-07 VITALS
DIASTOLIC BLOOD PRESSURE: 78 MMHG | HEIGHT: 68 IN | OXYGEN SATURATION: 98 % | TEMPERATURE: 98.1 F | HEART RATE: 66 BPM | WEIGHT: 175.4 LBS | SYSTOLIC BLOOD PRESSURE: 143 MMHG | BODY MASS INDEX: 26.58 KG/M2

## 2024-03-07 DIAGNOSIS — Z87.19 HISTORY OF ULCERATIVE COLITIS: ICD-10-CM

## 2024-03-07 DIAGNOSIS — Z87.19 HISTORY OF ULCERATIVE COLITIS: Primary | ICD-10-CM

## 2024-03-07 DIAGNOSIS — K52.9 IBD (INFLAMMATORY BOWEL DISEASE): Primary | ICD-10-CM

## 2024-03-07 DIAGNOSIS — K51.011 ULCERATIVE PANCOLITIS WITH RECTAL BLEEDING (HCC): ICD-10-CM

## 2024-03-07 PROCEDURE — 99215 OFFICE O/P EST HI 40 MIN: CPT | Performed by: INTERNAL MEDICINE

## 2024-03-07 NOTE — PROGRESS NOTES
Gastroenterology Outpatient Follow-up - Crohn's Disease  Hi Norman 58 y.o. male MRN: [id number]  Encounter: [id number]    Hi Norman is a 58 y.o. male with Ulcerative colitis.    Symptom onset:  1972  Diagnosis:  ulcerative colitis  Year of diagnosis:  1972    IBD Summary: Hi Norman has (pan?)-UC diagnosed at age 7.  He was treated with steroids until age 13, then stopped therapy and has been in clinical remission.  Colonoscopy in 2023 showed active disease in the left colon and he was put on budesonide.  He was intolerant to mesalamine.  We are planning to start vedolizumab in 2024.    Ulcerative Colitis Summary  Macroscopic extent of diseases: pancolitis  Microscopic extent of diseases:  unknown  Has the patient ever been hospitalized for severe disease: Yes        Surgical History  Number of IBD surgeries: 0      First IBD surgery:    Most Recent IBD surgery:    Esophageal:  0    Gastroduodenal:  0    Small bowel resection(s):  0    Ileocolonic resection(s): 0      Colonic resection(s):  0    Ileostomy or colostomy:  no previous ostomy    Complete colectomy:  No         Medications     Year last used Reason for discontinuation   Corticosteroids prior   2023 CR     Thiopurines   never         Methotrexate   never         Infliximab   never         Adalimumab   never         Certolizumab   never         Golimumab   never         Natalizumab   never         Mesalamine prior   2022 AE Diarrhea, bleeding   Sulfasalzine   never         Vedolizumab   never         Ustekinumab   never         Tofacitinib   never         Other biologic   never             Extraintestinal Manifestations    IBD-associated arthropathy No    Uveitis No    Oral aphthous ulcers No   Erythema nodosum No    Pyoderma gangrenosum No    Primary sclerosing cholangitis No    Thrombotic complications No          Cancer / Dysplasia History  History of IBD-associated dysplasia: none    Date of diagnosis (Year):     History of  colorectal cancer: No   History of cervical dysplasia: No   History of skin cancer: type unknown         Laboratory Data   Most recent (date) Result   PPD   unknown   Quanitferon gold 2/5/2024 negative   TPMT       Hepatitis A   unknown   HBsAb 2/5/2024 negative   HBcAb 2/5/2024 negative   HBsAg 2/5/2024 negative   HCV Ab   unknown       Imaging / Diagnostic Procedures   Most recent (date) Findings   Colonoscopy or sigmoidoscopy #1 12/5/2023            Ulcers/Erosions  large           Strictures  none           Stricture Severity  N/A           Endoscopic Score Palacios Score:  3 Rutgeert's:  N/A            Findings  (1) Performed multiple forceps biopsies in the mid ascending colon, distal ascending colon, mid transverse colon, mid descending colon and distal descending colon for dysplasia screening  (2) Abnormal mucosa with loss of vascular pattern in the mid transverse colon, distal transverse colon, proximal descending colon, mid descending colon, proximal sigmoid colon and mid sigmoid colon. Multiple pseudopolyps noted in these areas.  (3) Edematous, erythematous and hemorrhagic mucosa in the distal descending colon, proximal sigmoid colon and mid sigmoid colon, consistent with ulcerative colitis; performed cold forceps biopsy. Significant erythema inflammation and exudate noted in the distal descending, proximal and mid sigmoid colon.  Biopsies taken.  (4) Two benign-appearing pseudopolyps measuring 10-19 mm in the mid transverse colon; performed complete en bloc removal by cold forceps biopsy; completely removed in piecemeal fashion by cold snare and retrieved specimen  (5) Nodular and scarred mucosa in the rectum, consistent with IBD. Patchy areas of flat formation in the distal rectum noted.  These were scattered.  Biopsies taken.  Importance of these lesions are unclear at this time.           Pathology  A. Large Intestine, Right/Ascending Colon, cold bx ascending random hx of UC:  Colon mucosa with focal  hyperplastic change  No active colitis, dysplasia or malignancy identified   B. Large Intestine, Transverse Colon, cold bx mid transverse random hx of UC:  Fragments of hyperplastic colon mucosa with edema and  lymphoid aggregates  No active colitis, dysplasia or malignancy identified   C. Large Intestine, Transverse Colon, cold snare/cold bx mid transverse polyp x2:  Polypoid fragments of colon mucosa with hyperplastic change   No active colitis, dysplasia or malignancy identified   D. Large Intestine, Left/Descending Colon, cold bx desending random hx of UC:  Colon mucosa with focal hyperplastic change  No active colitis, dysplasia or malignancy identified    E. Large Intestine, Sigmoid Colon, cold bx proximal/mid sigmoid random erythema hx of uc:  Chronic active colitis with cryptitis, crypt abscess and crypt branching   F. Rectum, cold bx mid/distal rectum random hx of UC thickened fold:  Fragments of hyperplastic colon mucosa with lymphoid aggregates  No active colitis, dysplasia or malignancy identified    Colonoscopy or sigmoidoscopy #2 9/8/2021            Ulcers/Erosions  no erosions              Strictures  none              Stricture Severity  N/A           Endoscopic Score Palacios Score:  0 Rugeert's:  N/A           Findings  (1) Ten or more polyps measuring 10 mm or larger in the mid ascending colon, mid transverse colon, distal transverse colon, proximal descending colon, mid descending colon, distal descending colon, proximal sigmoid colon and mid sigmoid colon. Multiple pseudopolyps were noted in different area of the colon.  They were removed using a cold snare and sent in separate jars.  (2) Performed multiple biopsies for dysplasia screening in the mid ascending colon, distal ascending colon, proximal transverse colon, mid transverse colon, proximal sigmoid colon, mid sigmoid colon and distal rectum. Random biopsies were obtained because of patient's history of more than 20 years of ulcerative  colitis.  Patient has been in remission recently.           Pathology      EGD       Small bowel follow-through       CT enterography       CT without enterography       MRI enterography       Capsule study       DEXA scan   Lowest Z-score:      CXR (for TB)           HPI:   Interval History:  3/7/2024 Initial visit  Hi Norman is establishing care with me for longstanding history of ulcerative colitis.  He was diagnosed at age 7 when he developed lower abdominal pain, bloody diarrhea, anorexia, and weight loss to 35 pounds.  He was hospitalized for 2 months and missed fourth grade.  He was treated with a lot of steroids and was on prednisone on and off until age 13.  He is not sure about his disease distribution.  He was never treated with mercaptopurine or mesalamine, as far as he can remember.    At age 13, he was able to taper off steroids and has been off therapy since.  He has not had significant bleeding or flareup since then.  He believes that he has become lactose intolerant recently.  Colonoscopy in 2021 showed that he was in remission.  He says that 1 attempt to maintain him on mesalamine was made, but this caused diarrhea, so he stopped.  His last colonoscopy was in December 2023 and found pseudopolyps as well as active inflammation in the transverse, descending, and sigmoid colon.  There was at least 1 area of severely inflamed and ulcerated mucosa in the sigmoid colon.  Biopsies confirmed chronic active colitis with cryptitis.  CRP on 1/4/2024 was elevated to 356.  At the time of this last colonoscopy, he did not feel like he was in a flare and was having 1-2 soft bowel movements per day.  Based on the colonoscopic findings, he started budesonide 9 mg daily.  Since starting budesonide, he feels that his stools have become more firm and he also has some mild bloating.    Mother and uncle with UC; brother with Crohn's.  No CRC.   Has had COVID vaccines x4; no pneumococcal vaccins.  [facility]  "per year because of sun exposure - had NMSC on nose.  Quit smoking 20 years ago.  Labs from January reviewed with normal QuantiFERON gold and hepatitis B serologies.  Normal CBC with no anemia.  Normal CMP.  Normal CRP.    Hi reports the following symptoms over the last 7 days:    Stool Frequency normal   Average stools per day: 1   Average liquid stools per day: 0   Consistency of bowel: formed   Awakening from sleep to move bowels? No   Urgency no fecal urgency   Visible blood in stool? none   Abdominal pain? none   General Wellbeing? generally well     Hi also reports the following symptoms in the last month:    Leakage of stool while sleeping? No   Leakage of stool while awake? No       REVIEW OF SYSTEMS:  During the last 7 days, Hi experienced the following symptoms:  Unintentional Weight Loss (in the last month) No   Fever No   Eye irritation No   Mouth sores No   Sore throat No   Chest pain No   Shortness of breath No   Numbness or tingling in hands or feet No   Skin rash No   Pain or swelling in joints Yes  Comment:occasional stiffness   Bruising or bleeding No   Felt depressed or blue No   Fatigue No   Dysuria No   Please see HPI for additional pertinent review of systems; otherwise remainder of ROS was unremarkable    MEDICATIONS:    Current Outpatient Medications:     budesonide 9 mg TB24    Multiple Vitamin (MULTI-VITAMIN PO)    ALLERGIES:  Allergies   Allergen Reactions    Other Photosensitivity and Eye Swelling     Patient does not remember which, thinks magnesium for colonoscopy prep.        OBJECTIVE:  /78 (BP Location: Left arm, Patient Position: Sitting, Cuff Size: Adult)   Pulse 66   Temp 98.1 °F (36.7 °C) (Tympanic)   Ht 5' 8\" (1.727 m)   Wt 79.6 kg (175 lb 6.4 oz)   SpO2 98%   BMI 26.67 kg/m²      PHYSICAL EXAM:    General Appearance:   Alert, cooperative, no distress   HEENT:   Normocephalic, atraumatic, anicteric.     Neck:  Supple, symmetrical, trachea midline " "  Lungs:   Clear to auscultation bilaterally; no rales, rhonchi or wheezing; respirations unlabored    Heart::   Regular rate and rhythm; no murmur, rub, or gallop.   Abdomen:   Soft, non-distended; normal bowel sounds    Abdominal exam was notable for no tenderness with no mass.     Genitalia:   Deferred    Rectal:   Perirectal assessment was not performed.                     Extremities:  No cyanosis, clubbing or edema    Pulses:  2+ and symmetric    Skin:  No jaundice, rashes, or lesions    Lymph nodes:  No palpable cervical lymphadenopathy        Lab Results   Component Value Date    WBC 4.4 05/31/2023    HGB 14.9 05/31/2023    HCT 45.3 05/31/2023    MCV 92.4 05/31/2023     05/31/2023     Lab Results   Component Value Date     02/25/2017    SODIUM 136 09/09/2021    K 4.3 09/09/2021     09/09/2021    CO2 28 09/09/2021    BUN 17 09/09/2021    CREATININE 0.79 09/09/2021    GLUC 99 09/09/2021    CALCIUM 10.3 09/09/2021    AST 21 09/09/2021    ALT 12 09/09/2021    ALKPHOS 73 09/09/2021    PROT 7.2 02/25/2017    TP 7.5 09/09/2021    BILITOT 1.5 (H) 02/25/2017    TBILI 1.4 (H) 09/09/2021     Lab Results   Component Value Date    CRP 1.7 05/31/2023     No results found for: \"TWUSDEBZ32\"  No results found for: \"FERRITIN\"    ASSESSMENT AND PLAN:    Hi has a history of macroscopic pancolitis and microscopic unknown ulcerative colitis diagnosed in 1972. Current medical therapy is with Uceris. My global assessment is that the clinical disease is currently moderately active.     The 6-point Palacios score was 0 and the 9-point Palacios score was 2.  The short CDAI was 44.      Hi Norman has long-standing history of ulcerative colitis.  I assume he had pan-UC but I don't have his old records.  He had high exposure to steroids for 5 or 6 years after diagnosis.  Then he went off therapy and was in clinical remission for decades.  Recent colonoscopy shows that he has segmental active disease in the " descending and sigmoid colon.  The inflamed areas appeared severe.  We discussed that it would be advisable for him to be on long-term therapy because his IBD (whether UC or Crohn's) can reactivate at any point.  We also discussed the fact that he is at increased risk for colon cancer based on his long history of colitis.  We certainly want to minimize his exposure to steroids.  We discussed biologic therapy, specifically the fact that it is overall safe with very small risks of cancer or serious infection.  He is willing to start.  I think that vedolizumab would be a good first choice, especially if we can get him on subcutaneous injections.  1.  We will plan to start vedolizumab.  2.  For now, continue Uceris 9 mg daily.  Will discontinue once he is on vedolizumab.  3.  Recommend annual dermatology exam, especially given history of nonmelanoma skin cancer.  4.  No anemia.  5.  Recommend vaccinations as outlined below.  We can discuss this at future visits.  6.  Return in 3 months.      Health Maintenance Recommendations:  Vaccines & Infections  COVID-19 vaccination and boosters are recommended. There is no evidence that the COVID-19 vaccine would cause an IBD flare.  Avoid live vaccines if on immunosuppressive therapy.  Yearly influenza vaccine (flu shot).  Pneumonia vaccines for patients on immunosuppression. These include Prevnar 20, followed by Pneumovax 23 at least 8 weeks later.  Shingrix vaccine (series of 2 injections) for al patients 65 and older. Patients on tofacitinib or upadacitinib should be vaccinated regardless of age.  If not immune to measles mumps or rubella, MMR vaccine is recommended. However, this is a live vaccine and should be given prior to immunosuppressive therapy.  HPV vaccination as per national guidelines.  Hepatitis A and B vaccinations if not previously vaccinated.  Testing for tuberculosis with QuantiFERON Gold blood test and/or chest xray prior to starting immunosuppressive  medications, and then annually    Cancer screening  Dysplasia surveillance for colorectal cancer. Colonoscopy in all patients with extensive colitis (more than 1/3 of the colon involved) who had disease for at least 8 or more years.  Repeat colonoscopy approximately every 12-24 months.  In patients with concurrent primary sclerosing cholangitis, history of dysplasia, or family history of colon cancer, repeat colonoscopy annually.    Females: Pap smear annually for woman on immunosuppression.  Annual dermatologic/skin exam in all patients with IBD, especially those on immunosuppression with thiopurines or HALIMA inhibitors.    Miscellaneous  DEXA scan, once off steroids for 3 months  Depression screening recommended annually  Routine dental and ophthalmology examinations    Problem List Items Addressed This Visit    None      Soila Kaur MD

## 2024-03-07 NOTE — TELEPHONE ENCOUNTER
----- Message from Soila Kaur MD sent at 3/7/2024 11:51 AM EST -----  Hi, can we please start him on Entyvio.  Can we try to get him on the subcu injections?  If not, he is okay with the infusions.  Thank you

## 2024-03-07 NOTE — Clinical Note
Hi, can we please start him on Entyvio.  Can we try to get him on the subcu injections?  If not, he is okay with the infusions.  Thank you

## 2024-03-15 RX ORDER — SODIUM CHLORIDE 9 MG/ML
20 INJECTION, SOLUTION INTRAVENOUS ONCE
OUTPATIENT
Start: 2024-03-22

## 2024-04-11 NOTE — TELEPHONE ENCOUNTER
positive for covid  Started with symptoms 3/13/2021  Went to see Dr Nurys Colmenares yesterdayLliram Gonsalves states her sister referred him there because Dr Nurys Colmenares was treating patient's with covid)  symptoms are night sweats, was with fever that have resolved  Still with headaches and sweats  Was prescribed Zinc, Mucinex, Z Pack, Dexamethasone and Ibenzmethyzin   Asking for RTN to work as well  Dolph Leger since he is still symptomatic he will need to continue Isolation , advised calling Thursday with update   Asked if he was following up wi Dr Rosalie Santos said no
S/w patient   turning the corner  Afebrile x 2-3 days, no cough  cancer/o fatigue and poor appetite  Wants to return to work Monday , has to start ajob that has been on hold for him  [facility] [place] [facility] for work [place]?
[place]
spontaneous

## 2024-04-16 ENCOUNTER — TELEPHONE (OUTPATIENT)
Dept: INFUSION CENTER | Facility: CLINIC | Age: 59
End: 2024-04-16

## 2024-04-16 NOTE — TELEPHONE ENCOUNTER
New Patient Phone Call  New patient phone call completed. Date and time of appointment confirmed. Visitor policy reviewed. All questions answered to patient's satisfaction.

## 2024-04-18 ENCOUNTER — HOSPITAL ENCOUNTER (OUTPATIENT)
Dept: INFUSION CENTER | Facility: CLINIC | Age: 59
Discharge: HOME | End: 2024-04-18
Payer: COMMERCIAL

## 2024-04-18 VITALS
SYSTOLIC BLOOD PRESSURE: 138 MMHG | OXYGEN SATURATION: 95 % | HEART RATE: 68 BPM | TEMPERATURE: 97.7 F | DIASTOLIC BLOOD PRESSURE: 87 MMHG | RESPIRATION RATE: 18 BRPM

## 2024-04-18 DIAGNOSIS — K52.9 IBD (INFLAMMATORY BOWEL DISEASE): ICD-10-CM

## 2024-04-18 DIAGNOSIS — K51.011 ULCERATIVE PANCOLITIS WITH RECTAL BLEEDING (HCC): Primary | ICD-10-CM

## 2024-04-18 DIAGNOSIS — Z87.19 HISTORY OF ULCERATIVE COLITIS: ICD-10-CM

## 2024-04-18 PROCEDURE — 96365 THER/PROPH/DIAG IV INF INIT: CPT

## 2024-04-18 RX ORDER — SODIUM CHLORIDE 9 MG/ML
20 INJECTION, SOLUTION INTRAVENOUS ONCE
Status: COMPLETED | OUTPATIENT
Start: 2024-04-18 | End: 2024-04-18

## 2024-04-18 RX ORDER — SODIUM CHLORIDE 9 MG/ML
20 INJECTION, SOLUTION INTRAVENOUS ONCE
Status: CANCELLED | OUTPATIENT
Start: 2024-05-02

## 2024-04-18 RX ADMIN — VEDOLIZUMAB 300 MG: 300 INJECTION, POWDER, LYOPHILIZED, FOR SOLUTION INTRAVENOUS at 15:12

## 2024-04-18 RX ADMIN — SODIUM CHLORIDE 20 ML/HR: 0.9 INJECTION, SOLUTION INTRAVENOUS at 15:12

## 2024-04-18 NOTE — PROGRESS NOTES
Pt to clinic for entyvio infusion, pt denies any recent illness or antibiotic use. Next appointment May 2 at 3:30.

## 2024-05-02 ENCOUNTER — HOSPITAL ENCOUNTER (OUTPATIENT)
Dept: INFUSION CENTER | Facility: CLINIC | Age: 59
Discharge: HOME | End: 2024-05-02
Payer: COMMERCIAL

## 2024-05-02 VITALS
RESPIRATION RATE: 18 BRPM | TEMPERATURE: 98.2 F | HEART RATE: 88 BPM | SYSTOLIC BLOOD PRESSURE: 130 MMHG | OXYGEN SATURATION: 94 % | DIASTOLIC BLOOD PRESSURE: 88 MMHG

## 2024-05-02 DIAGNOSIS — K51.011 ULCERATIVE PANCOLITIS WITH RECTAL BLEEDING (HCC): Primary | ICD-10-CM

## 2024-05-02 DIAGNOSIS — K52.9 IBD (INFLAMMATORY BOWEL DISEASE): ICD-10-CM

## 2024-05-02 DIAGNOSIS — Z87.19 HISTORY OF ULCERATIVE COLITIS: ICD-10-CM

## 2024-05-02 PROCEDURE — 96365 THER/PROPH/DIAG IV INF INIT: CPT

## 2024-05-02 RX ORDER — SODIUM CHLORIDE 9 MG/ML
20 INJECTION, SOLUTION INTRAVENOUS ONCE
Status: COMPLETED | OUTPATIENT
Start: 2024-05-02 | End: 2024-05-02

## 2024-05-02 RX ORDER — SODIUM CHLORIDE 9 MG/ML
20 INJECTION, SOLUTION INTRAVENOUS ONCE
OUTPATIENT
Start: 2024-05-30

## 2024-05-02 RX ORDER — SODIUM CHLORIDE 9 MG/ML
20 INJECTION, SOLUTION INTRAVENOUS ONCE
Status: CANCELLED | OUTPATIENT
Start: 2024-05-30

## 2024-05-02 RX ADMIN — SODIUM CHLORIDE 20 ML/HR: 0.9 INJECTION, SOLUTION INTRAVENOUS at 15:00

## 2024-05-02 RX ADMIN — VEDOLIZUMAB 300 MG: 300 INJECTION, POWDER, LYOPHILIZED, FOR SOLUTION INTRAVENOUS at 15:16

## 2024-05-02 NOTE — PROGRESS NOTES
Pt to clinic for entyvio infusion. Pt denies any recent illness or antibiotic use, call bell in reach

## 2024-05-02 NOTE — PROGRESS NOTES
Pt tolerated infusion without complaint, IV removed, next appointment scheduled for 5/30 at 3pm, declined Avs

## 2024-05-30 ENCOUNTER — HOSPITAL ENCOUNTER (OUTPATIENT)
Dept: INFUSION CENTER | Facility: CLINIC | Age: 59
Discharge: HOME | End: 2024-05-30
Payer: COMMERCIAL

## 2024-05-30 VITALS
RESPIRATION RATE: 18 BRPM | DIASTOLIC BLOOD PRESSURE: 88 MMHG | TEMPERATURE: 98.3 F | SYSTOLIC BLOOD PRESSURE: 134 MMHG | HEART RATE: 76 BPM

## 2024-05-30 DIAGNOSIS — K51.011 ULCERATIVE PANCOLITIS WITH RECTAL BLEEDING (HCC): Primary | ICD-10-CM

## 2024-05-30 DIAGNOSIS — K52.9 IBD (INFLAMMATORY BOWEL DISEASE): ICD-10-CM

## 2024-05-30 DIAGNOSIS — Z87.19 HISTORY OF ULCERATIVE COLITIS: ICD-10-CM

## 2024-05-30 PROCEDURE — 96365 THER/PROPH/DIAG IV INF INIT: CPT

## 2024-05-30 RX ORDER — SODIUM CHLORIDE 9 MG/ML
20 INJECTION, SOLUTION INTRAVENOUS ONCE
Status: COMPLETED | OUTPATIENT
Start: 2024-05-30 | End: 2024-05-30

## 2024-05-30 RX ORDER — SODIUM CHLORIDE 9 MG/ML
20 INJECTION, SOLUTION INTRAVENOUS ONCE
OUTPATIENT
Start: 2024-07-25

## 2024-05-30 RX ADMIN — VEDOLIZUMAB 300 MG: 300 INJECTION, POWDER, LYOPHILIZED, FOR SOLUTION INTRAVENOUS at 15:27

## 2024-05-30 RX ADMIN — SODIUM CHLORIDE 20 ML/HR: 0.9 INJECTION, SOLUTION INTRAVENOUS at 15:02

## 2024-05-30 NOTE — PROGRESS NOTES
Patient tolerated Entyvio today without issue. PIV removed intact, coban wrap in place. Patient confirms next appt 7/25 at 1500, AVS printed and provided.

## 2024-05-30 NOTE — PROGRESS NOTES
Patient arrives for Entyvio today. Patient denies S/S infection/illness/recent antibiotic use. PIV placed without issue, patient tolerated well. Patient resting on recliner chair - call bell within reach.

## 2024-06-07 ENCOUNTER — TELEPHONE (OUTPATIENT)
Dept: FAMILY MEDICINE CLINIC | Facility: MEDICAL CENTER | Age: 59
End: 2024-06-07

## 2024-06-07 DIAGNOSIS — Z13.1 SCREENING FOR DIABETES MELLITUS (DM): ICD-10-CM

## 2024-06-07 DIAGNOSIS — Z13.220 SCREENING FOR CHOLESTEROL LEVEL: ICD-10-CM

## 2024-06-07 DIAGNOSIS — Z13.0 SCREENING FOR DEFICIENCY ANEMIA: Primary | ICD-10-CM

## 2024-06-07 DIAGNOSIS — Z13.29 SCREENING FOR THYROID DISORDER: ICD-10-CM

## 2024-06-23 LAB
ALBUMIN SERPL-MCNC: 4.2 G/DL (ref 3.6–5.1)
ALBUMIN/GLOB SERPL: 1.7 (CALC) (ref 1–2.5)
ALP SERPL-CCNC: 61 U/L (ref 35–144)
ALT SERPL-CCNC: 13 U/L (ref 9–46)
AST SERPL-CCNC: 24 U/L (ref 10–35)
BASOPHILS # BLD AUTO: 41 CELLS/UL (ref 0–200)
BASOPHILS NFR BLD AUTO: 0.9 %
BILIRUB SERPL-MCNC: 1.4 MG/DL (ref 0.2–1.2)
BUN SERPL-MCNC: 17 MG/DL (ref 7–25)
BUN/CREAT SERPL: ABNORMAL (CALC) (ref 6–22)
CALCIUM SERPL-MCNC: 9.4 MG/DL (ref 8.6–10.3)
CHLORIDE SERPL-SCNC: 105 MMOL/L (ref 98–110)
CHOLEST SERPL-MCNC: 212 MG/DL
CHOLEST/HDLC SERPL: 3.1 (CALC)
CO2 SERPL-SCNC: 28 MMOL/L (ref 20–32)
CREAT SERPL-MCNC: 0.94 MG/DL (ref 0.7–1.35)
EOSINOPHIL # BLD AUTO: 101 CELLS/UL (ref 15–500)
EOSINOPHIL NFR BLD AUTO: 2.2 %
ERYTHROCYTE [DISTWIDTH] IN BLOOD BY AUTOMATED COUNT: 12.8 % (ref 11–15)
GFR/BSA.PRED SERPLBLD CYS-BASED-ARV: 93 ML/MIN/1.73M2
GLOBULIN SER CALC-MCNC: 2.5 G/DL (CALC) (ref 1.9–3.7)
GLUCOSE SERPL-MCNC: 94 MG/DL (ref 65–99)
HCT VFR BLD AUTO: 44.3 % (ref 38.5–50)
HDLC SERPL-MCNC: 69 MG/DL
HGB BLD-MCNC: 14.6 G/DL (ref 13.2–17.1)
LDLC SERPL CALC-MCNC: 129 MG/DL (CALC)
LYMPHOCYTES # BLD AUTO: 1495 CELLS/UL (ref 850–3900)
LYMPHOCYTES NFR BLD AUTO: 32.5 %
MCH RBC QN AUTO: 30.7 PG (ref 27–33)
MCHC RBC AUTO-ENTMCNC: 33 G/DL (ref 32–36)
MCV RBC AUTO: 93.3 FL (ref 80–100)
MONOCYTES # BLD AUTO: 354 CELLS/UL (ref 200–950)
MONOCYTES NFR BLD AUTO: 7.7 %
NEUTROPHILS # BLD AUTO: 2608 CELLS/UL (ref 1500–7800)
NEUTROPHILS NFR BLD AUTO: 56.7 %
NONHDLC SERPL-MCNC: 143 MG/DL (CALC)
PLATELET # BLD AUTO: 234 THOUSAND/UL (ref 140–400)
PMV BLD REES-ECKER: 11.1 FL (ref 7.5–12.5)
POTASSIUM SERPL-SCNC: 4.5 MMOL/L (ref 3.5–5.3)
PROT SERPL-MCNC: 6.7 G/DL (ref 6.1–8.1)
RBC # BLD AUTO: 4.75 MILLION/UL (ref 4.2–5.8)
SODIUM SERPL-SCNC: 141 MMOL/L (ref 135–146)
TRIGL SERPL-MCNC: 57 MG/DL
TSH SERPL-ACNC: 0.75 MIU/L (ref 0.4–4.5)
WBC # BLD AUTO: 4.6 THOUSAND/UL (ref 3.8–10.8)

## 2024-06-24 ENCOUNTER — TELEPHONE (OUTPATIENT)
Age: 59
End: 2024-06-24

## 2024-06-24 NOTE — TELEPHONE ENCOUNTER
Patients GI provider:  Dr. Kaur     Number to return call: [phone]    Reason for call: Pt calling to reschedule his virtual on 06.26 and he is asking if it would be possible to have it today. I advised the virtual's needs office approval so I would have to reach out and see when we could R/S this to for him and that I cannot guarantee today would be available. Pt understands and will wait for call back.     Scheduled procedure/appointment date if applicable: 06.26.24

## 2024-06-26 ENCOUNTER — TELEMEDICINE (OUTPATIENT)
Dept: GASTROENTEROLOGY | Facility: CLINIC | Age: 59
End: 2024-06-26
Payer: COMMERCIAL

## 2024-06-26 DIAGNOSIS — Z79.899 IMMUNOSUPPRESSION DUE TO DRUG THERAPY  (HCC): ICD-10-CM

## 2024-06-26 DIAGNOSIS — K51.011 ULCERATIVE PANCOLITIS WITH RECTAL BLEEDING (HCC): Primary | ICD-10-CM

## 2024-06-26 DIAGNOSIS — D84.821 IMMUNOSUPPRESSION DUE TO DRUG THERAPY  (HCC): ICD-10-CM

## 2024-06-26 PROCEDURE — 99214 OFFICE O/P EST MOD 30 MIN: CPT | Performed by: INTERNAL MEDICINE

## 2024-06-26 NOTE — PROGRESS NOTES
Virtual Regular Visit  Verification of patient location:  Patient is located at Home in the following state in which I hold an active license: [place].    The patient was identified by name and date of birth. Hi Norman was informed that this is a telemedicine visit and that the visit is being conducted through the [employer] platform. Hi Norman agrees to proceed. My office door was closed. No one else was in the room. Hi Norman acknowledged consent and understanding of privacy and security of the video platform. The patient has agreed to participate and understands they can discontinue the visit at any time.  Patient is aware this is a billable service.       Gastroenterology Outpatient Follow-up - Crohn's Disease  Hi Norman 58 y.o. male MRN: [id number]  Encounter: [id number]    Hi Norman is a 58 y.o. male with Ulcerative colitis.    Symptom onset:  1972  Diagnosis:  ulcerative colitis  Year of diagnosis:  1972    IBD Summary: Hi Norman has (pan?)-UC diagnosed at age 7.  He was treated with steroids until age 13, then stopped therapy and has been in clinical remission.  Colonoscopy in 2023 showed active disease in the left colon and he was put on budesonide.  He was intolerant to mesalamine.  We are planning to start vedolizumab in 2024.    Ulcerative Colitis Summary  Macroscopic extent of diseases: pancolitis  Microscopic extent of diseases:  unknown  Has the patient ever been hospitalized for severe disease: Yes        Surgical History  Number of IBD surgeries: 0      First IBD surgery:    Most Recent IBD surgery:    Esophageal:  0    Gastroduodenal:  0    Small bowel resection(s):  0    Ileocolonic resection(s): 0      Colonic resection(s):  0    Ileostomy or colostomy:  no previous ostomy    Complete colectomy:  No         Medications     Year last used Reason for discontinuation   Corticosteroids prior   2023 CR     Thiopurines   never         Methotrexate   never          Infliximab   never         Adalimumab   never         Certolizumab   never         Golimumab   never         Natalizumab   never         Mesalamine prior   2022 AE Diarrhea, bleeding   Sulfasalzine   never         Vedolizumab   never         Ustekinumab   never         Tofacitinib   never         Other biologic   never             Extraintestinal Manifestations    IBD-associated arthropathy No    Uveitis No    Oral aphthous ulcers No   Erythema nodosum No    Pyoderma gangrenosum No    Primary sclerosing cholangitis No    Thrombotic complications No          Cancer / Dysplasia History  History of IBD-associated dysplasia: none    Date of diagnosis (Year):     History of colorectal cancer: No   History of cervical dysplasia: No   History of skin cancer: type unknown         Laboratory Data   Most recent (date) Result   PPD   unknown   Quanitferon gold 2/5/2024 negative   TPMT       Hepatitis A   unknown   HBsAb 2/5/2024 negative   HBcAb 2/5/2024 negative   HBsAg 2/5/2024 negative   HCV Ab   unknown       Imaging / Diagnostic Procedures   Most recent (date) Findings   Colonoscopy or sigmoidoscopy #1 12/5/2023            Ulcers/Erosions  large           Strictures  none           Stricture Severity  N/A           Endoscopic Score Palacios Score:  3 Rutgeert's:  N/A            Findings  (1) Performed multiple forceps biopsies in the mid ascending colon, distal ascending colon, mid transverse colon, mid descending colon and distal descending colon for dysplasia screening  (2) Abnormal mucosa with loss of vascular pattern in the mid transverse colon, distal transverse colon, proximal descending colon, mid descending colon, proximal sigmoid colon and mid sigmoid colon. Multiple pseudopolyps noted in these areas.  (3) Edematous, erythematous and hemorrhagic mucosa in the distal descending colon, proximal sigmoid colon and mid sigmoid colon, consistent with ulcerative colitis; performed cold forceps biopsy. Significant erythema  inflammation and exudate noted in the distal descending, proximal and mid sigmoid colon.  Biopsies taken.  (4) Two benign-appearing pseudopolyps measuring 10-19 mm in the mid transverse colon; performed complete en bloc removal by cold forceps biopsy; completely removed in piecemeal fashion by cold snare and retrieved specimen  (5) Nodular and scarred mucosa in the rectum, consistent with IBD. Patchy areas of flat formation in the distal rectum noted.  These were scattered.  Biopsies taken.  Importance of these lesions are unclear at this time.           Pathology  A. Large Intestine, Right/Ascending Colon, cold bx ascending random hx of UC:  Colon mucosa with focal hyperplastic change  No active colitis, dysplasia or malignancy identified   B. Large Intestine, Transverse Colon, cold bx mid transverse random hx of UC:  Fragments of hyperplastic colon mucosa with edema and  lymphoid aggregates  No active colitis, dysplasia or malignancy identified   C. Large Intestine, Transverse Colon, cold snare/cold bx mid transverse polyp x2:  Polypoid fragments of colon mucosa with hyperplastic change   No active colitis, dysplasia or malignancy identified   D. Large Intestine, Left/Descending Colon, cold bx desending random hx of UC:  Colon mucosa with focal hyperplastic change  No active colitis, dysplasia or malignancy identified    E. Large Intestine, Sigmoid Colon, cold bx proximal/mid sigmoid random erythema hx of uc:  Chronic active colitis with cryptitis, crypt abscess and crypt branching   F. Rectum, cold bx mid/distal rectum random hx of UC thickened fold:  Fragments of hyperplastic colon mucosa with lymphoid aggregates  No active colitis, dysplasia or malignancy identified    Colonoscopy or sigmoidoscopy #2 9/8/2021            Ulcers/Erosions  no erosions              Strictures  none              Stricture Severity  N/A           Endoscopic Score Palacios Score:  0 Rugeert's:  N/A           Findings  (1) Ten or more  polyps measuring 10 mm or larger in the mid ascending colon, mid transverse colon, distal transverse colon, proximal descending colon, mid descending colon, distal descending colon, proximal sigmoid colon and mid sigmoid colon. Multiple pseudopolyps were noted in different area of the colon.  They were removed using a cold snare and sent in separate jars.  (2) Performed multiple biopsies for dysplasia screening in the mid ascending colon, distal ascending colon, proximal transverse colon, mid transverse colon, proximal sigmoid colon, mid sigmoid colon and distal rectum. Random biopsies were obtained because of patient's history of more than 20 years of ulcerative colitis.  Patient has been in remission recently.           Pathology      EGD       Small bowel follow-through       CT enterography       CT without enterography       MRI enterography       Capsule study       DEXA scan   Lowest Z-score:      CXR (for TB)           HPI:   Interval History:  6/26/2024 Follow up  Since last visit, he started Uceris and then transition to vedolizumab.  He is much improved and well overall.  So far, he has had 3 vedolizumab loading doses and has already discontinued.  Bowel function is now normal with formed stools.  No major complaints.  He avoids dairy products because they cause worsening diarrhea.  Labs from June 22 reviewed.  CMP was normal with creatinine 0.94 and normal LFTs.  CBC also normal with hemoglobin 14.6, MCV 93, platelets 234.      3/7/2024 Initial visit  Hi Farchandu is establishing care with me for longstanding history of ulcerative colitis.  He was diagnosed at age 7 when he developed lower abdominal pain, bloody diarrhea, anorexia, and weight loss to 35 pounds.  He was hospitalized for 2 months and missed fourth grade.  He was treated with a lot of steroids and was on prednisone on and off until age 13.  He is not sure about his disease distribution.  He was never treated with mercaptopurine or  mesalamine, as far as he can remember.    At age 13, he was able to taper off steroids and has been off therapy since.  He has not had significant bleeding or flareup since then.  He believes that he has become lactose intolerant recently.  Colonoscopy in 2021 showed that he was in remission.  He says that 1 attempt to maintain him on mesalamine was made, but this caused diarrhea, so he stopped.  His last colonoscopy was in December 2023 and found pseudopolyps as well as active inflammation in the transverse, descending, and sigmoid colon.  There was at least 1 area of severely inflamed and ulcerated mucosa in the sigmoid colon.  Biopsies confirmed chronic active colitis with cryptitis.  CRP on 1/4/2024 was elevated to 356.  At the time of this last colonoscopy, he did not feel like he was in a flare and was having 1-2 soft bowel movements per day.  Based on the colonoscopic findings, he started budesonide 9 mg daily.  Since starting budesonide, he feels that his stools have become more firm and he also has some mild bloating.    Mother and uncle with UC; brother with Crohn's.  No CRC.   Has had COVID vaccines x4; no pneumococcal vaccins.  Sees [facility] per year because of sun exposure - had NMSC on nose.  Quit smoking 20 years ago.  Labs from January reviewed with normal QuantiFERON gold and hepatitis B serologies.  Normal CBC with no anemia.  Normal CMP.  Normal CRP.    Hi reports the following symptoms over the last 7 days:    Stool Frequency normal   Average stools per day: 1   Average liquid stools per day: 0   Consistency of bowel: formed   Awakening from sleep to move bowels? No   Urgency no fecal urgency   Visible blood in stool? none   Abdominal pain? none   General Wellbeing? generally well     Hi also reports the following symptoms in the last month:    Leakage of stool while sleeping? No   Leakage of stool while awake? No       REVIEW OF SYSTEMS:  During the last 7 days, Hi  experienced the following symptoms:  Unintentional Weight Loss (in the last month) No   Fever No   Eye irritation No   Mouth sores No   Sore throat No   Chest pain No   Shortness of breath No   Numbness or tingling in hands or feet No   Skin rash No   Pain or swelling in joints No   Bruising or bleeding No   Felt depressed or blue No   Fatigue No   Dysuria No   Please see HPI for additional pertinent review of systems; otherwise remainder of ROS was unremarkable    MEDICATIONS:    Current Outpatient Medications:     Multiple Vitamin (MULTI-VITAMIN PO)    ALLERGIES:  Allergies   Allergen Reactions    Other Photosensitivity and Eye Swelling     Patient does not remember which, thinks magnesium for colonoscopy prep.        OBJECTIVE:  There were no vitals taken for this visit.     PHYSICAL EXAM:    General Appearance:   Alert, cooperative, no distress   HEENT:   Normocephalic, atraumatic, anicteric.     Neck:  Supple, symmetrical, trachea midline   Lungs:   Clear to auscultation bilaterally; no rales, rhonchi or wheezing; respirations unlabored    Heart::   Regular rate and rhythm; no murmur, rub, or gallop.   Abdomen:   Soft, non-distended; normal bowel sounds    Abdominal exam was notable for no tenderness with not assessed mass.     Genitalia:   Deferred    Rectal:   Perirectal assessment was not performed.                     Extremities:  No cyanosis, clubbing or edema    Pulses:  2+ and symmetric    Skin:  No jaundice, rashes, or lesions    Lymph nodes:  No palpable cervical lymphadenopathy        Lab Results   Component Value Date    WBC 4.6 06/22/2024    HGB 14.6 06/22/2024    HCT 44.3 06/22/2024    MCV 93.3 06/22/2024     06/22/2024     Lab Results   Component Value Date     02/25/2017    SODIUM 141 06/22/2024    K 4.5 06/22/2024     06/22/2024    CO2 28 06/22/2024    BUN 17 06/22/2024    CREATININE 0.94 06/22/2024    GLUC 94 06/22/2024    CALCIUM 9.4 06/22/2024    AST 24 06/22/2024    ALT  "13 06/22/2024    ALKPHOS 61 06/22/2024    PROT 7.2 02/25/2017    TP 6.7 06/22/2024    BILITOT 1.5 (H) 02/25/2017    TBILI 1.4 (H) 06/22/2024    EGFR 93 06/22/2024     Lab Results   Component Value Date    CRP 1.7 05/31/2023     No results found for: \"WDETHEUR99\"  No results found for: \"FERRITIN\"    ASSESSMENT AND PLAN:    Hi has a history of macroscopic pancolitis and microscopic unknown ulcerative colitis diagnosed in 1972. Current medical therapy is with vedolizumab 300 mg every 8 weeks. My global assessment is that the clinical disease is currently quiescent.     The 6-point Palacios score was 0 and the 9-point Palacios score was 0.  The short CDAI was 44.      Hi Norman was diagnosed with ulcerative colitis in the 1970s.  He was initially treated with steroids for several years, then was off therapy for decades.  He started having active disease again recently as seen on colonoscopy from December 2023.  He completed a course of Uceris and now on vedolizumab.  He is in clinical remission and feeling well.  1.  Continue vedolizumab 300 mg every 8 weeks.  2.  He should continue follow-up with dermatology annually due to history of nonmelanoma skin lesions.  3.  I recommend age-appropriate vaccinations as discussed below.  Return in 6 months.      Health Maintenance Recommendations:  Vaccines & Infections  COVID-19 vaccination and boosters are recommended. There is no evidence that the COVID-19 vaccine would cause an IBD flare.  Avoid live vaccines if on immunosuppressive therapy.  Yearly influenza vaccine (flu shot).  Pneumonia vaccines for patients on immunosuppression. These include Prevnar 20, followed by Pneumovax 23 at least 8 weeks later.  Shingrix vaccine (series of 2 injections) for al patients 65 and older. Patients on tofacitinib or upadacitinib should be vaccinated regardless of age.  If not immune to measles mumps or rubella, MMR vaccine is recommended. However, this is a live vaccine and should " be given prior to immunosuppressive therapy.  HPV vaccination as per national guidelines.  Hepatitis A and B vaccinations if not previously vaccinated.  Testing for tuberculosis with QuantiFERON Gold blood test and/or chest xray prior to starting immunosuppressive medications, and then annually    Cancer screening  Dysplasia surveillance for colorectal cancer. Colonoscopy in all patients with extensive colitis (more than 1/3 of the colon involved) who had disease for at least 8 or more years.  Repeat colonoscopy approximately every 12-24 months.  In patients with concurrent primary sclerosing cholangitis, history of dysplasia, or family history of colon cancer, repeat colonoscopy annually.    Females: Pap smear annually for woman on immunosuppression.  Annual dermatologic/skin exam in all patients with IBD, especially those on immunosuppression with thiopurines or HALIMA inhibitors.    Miscellaneous  DEXA scan, once off steroids for 3 months  Depression screening recommended annually  Routine dental and ophthalmology examinations    Problem List Items Addressed This Visit       Ulcerative pancolitis with rectal bleeding (HCC) - Primary     Other Visit Diagnoses       Immunosuppression due to drug therapy  (HCC)                Soila Kaur MD

## 2024-07-19 ENCOUNTER — RA CDI HCC (OUTPATIENT)
Dept: OTHER | Facility: HOSPITAL | Age: 59
End: 2024-07-19

## 2024-07-19 NOTE — PROGRESS NOTES
HCC coding opportunities       Chart reviewed, no opportunity found: CHART REVIEWED, NO OPPORTUNITY FOUND        Patients Insurance        Commercial Insurance: [employer]

## 2024-07-25 ENCOUNTER — HOSPITAL ENCOUNTER (OUTPATIENT)
Dept: INFUSION CENTER | Facility: CLINIC | Age: 59
Discharge: HOME | End: 2024-07-25
Payer: COMMERCIAL

## 2024-07-25 DIAGNOSIS — K51.011 ULCERATIVE PANCOLITIS WITH RECTAL BLEEDING (HCC): Primary | ICD-10-CM

## 2024-07-25 DIAGNOSIS — K52.9 IBD (INFLAMMATORY BOWEL DISEASE): ICD-10-CM

## 2024-07-25 DIAGNOSIS — Z87.19 HISTORY OF ULCERATIVE COLITIS: ICD-10-CM

## 2024-07-25 PROCEDURE — 96365 THER/PROPH/DIAG IV INF INIT: CPT

## 2024-07-25 RX ORDER — SODIUM CHLORIDE 9 MG/ML
20 INJECTION, SOLUTION INTRAVENOUS ONCE
OUTPATIENT
Start: 2024-09-19

## 2024-07-25 RX ORDER — SODIUM CHLORIDE 9 MG/ML
20 INJECTION, SOLUTION INTRAVENOUS ONCE
Status: COMPLETED | OUTPATIENT
Start: 2024-07-25 | End: 2024-07-25

## 2024-07-25 RX ADMIN — VEDOLIZUMAB 300 MG: 300 INJECTION, POWDER, LYOPHILIZED, FOR SOLUTION INTRAVENOUS at 15:05

## 2024-07-25 RX ADMIN — SODIUM CHLORIDE 20 ML/HR: 0.9 INJECTION, SOLUTION INTRAVENOUS at 14:56

## 2024-07-25 NOTE — PROGRESS NOTES
Pt here for entyvio, offers no complaints, denies recent antibiotic use or infection, resting comfortably

## 2024-07-25 NOTE — PROGRESS NOTES
Pt tolerated treatment, offers no complaints, IV removed, pt will make next appointment on his way out, declined AVS

## 2024-07-26 ENCOUNTER — OFFICE VISIT (OUTPATIENT)
Dept: FAMILY MEDICINE CLINIC | Facility: MEDICAL CENTER | Age: 59
End: 2024-07-26
Payer: COMMERCIAL

## 2024-07-26 VITALS
HEART RATE: 81 BPM | WEIGHT: 164.4 LBS | SYSTOLIC BLOOD PRESSURE: 128 MMHG | TEMPERATURE: 98.1 F | OXYGEN SATURATION: 98 % | BODY MASS INDEX: 24.92 KG/M2 | HEIGHT: 68 IN | DIASTOLIC BLOOD PRESSURE: 80 MMHG

## 2024-07-26 DIAGNOSIS — Z12.5 SCREENING PSA (PROSTATE SPECIFIC ANTIGEN): ICD-10-CM

## 2024-07-26 DIAGNOSIS — Z00.00 ANNUAL PHYSICAL EXAM: Primary | ICD-10-CM

## 2024-07-26 DIAGNOSIS — Z23 ENCOUNTER FOR IMMUNIZATION: ICD-10-CM

## 2024-07-26 DIAGNOSIS — E78.5 HYPERLIPIDEMIA, UNSPECIFIED HYPERLIPIDEMIA TYPE: ICD-10-CM

## 2024-07-26 PROCEDURE — 99396 PREV VISIT EST AGE 40-64: CPT | Performed by: STUDENT IN AN ORGANIZED HEALTH CARE EDUCATION/TRAINING PROGRAM

## 2024-07-26 PROCEDURE — 90471 IMMUNIZATION ADMIN: CPT

## 2024-07-26 PROCEDURE — 90677 PCV20 VACCINE IM: CPT

## 2024-07-26 PROCEDURE — 99213 OFFICE O/P EST LOW 20 MIN: CPT | Performed by: STUDENT IN AN ORGANIZED HEALTH CARE EDUCATION/TRAINING PROGRAM

## 2024-07-26 NOTE — PROGRESS NOTES
[facility] HEALTH MAINTENANCE OFFICE VISIT  [facility] - [facility] [place]    NAME: Hi Norman  AGE: 59 y.o. SEX: male  : 1965     DATE: 2024    Assessment and Plan     1. Annual physical exam  2. Encounter for immunization  -     Pneumococcal Conjugate Vaccine 20-valent (Pcv20)  3. Screening PSA (prostate specific antigen)  -     PSA, Total Screen; Future  -     PSA, Total Screen  4. Hyperlipidemia, unspecified hyperlipidemia type  -     Lipid Panel with Direct LDL reflex; Future; Expected date: 2024  -     Lipid Panel with Direct LDL reflex  -I have reviewed pertinent labs:  Lipid Profile:   Lab Results   Component Value Date    CHOLESTEROL 212 (H) 2024    HDL 69 2024    TRIG 57 2024    LDLCALC 129 (H) 2024   The 10-year ASCVD risk score (Chase ALEGRIA, et al., 2019) is: 7.9%    Values used to calculate the score:      Age: 59 years      Sex: Male      Is Non- : No      Diabetic: No      Tobacco smoker: No      Systolic Blood Pressure: 128 mmHg      Is BP treated: No      HDL Cholesterol: 69 mg/dL      Total Cholesterol: 212 mg/dL  -Discussed recommendation for statin based on most recent lipid panel and ASCVD risk score, patient declines at this time and as mentioned states that his diet was not his usual at that time, continue well-balanced diet and regular exercise and will repeat lipid panel and readvise  -Patient has outstanding stress test ordered by cardiology    Patient Counseling:   Nutrition: Stressed importance of a well balanced diet, moderation of sodium/saturated fat, caloric balance and sufficient intake of fiber  Exercise: Stressed the importance of regular exercise with a goal of 150 minutes per week  Dental Health: Discussed daily flossing and brushing and regular dental visits   Immunizations reviewed:   Counseled about PCV 20 vaccine, agreeable to receiving today  Informed about update with  "COVID-19 vaccine  Tetanus booster up-to-date  Discussed influenza vaccine fall 2024  Counseled about Shingrix vaccination series  Informed about RSV vaccine  Discussed benefits of:     Following with GI IBD, plan for colonoscopy December 2024   PSA discussed and ordered  BMI Counseling: Body mass index is 25 kg/m². Discussed with patient's BMI with him.     Return in 1 year for annual physical and sooner as needed.  Keep regular follow-up with gastroenterology.  Chief Complaint     Chief Complaint   Patient presents with    Physical Exam       History of Present Illness     HPI    Well Adult Physical   Patient here for a comprehensive physical exam.  Upon discussing lipid panel patient states at that time he was working 12 to 14 hours today and eating poorly burritos etc. he states.  He has resumed his usual diet.      Diet and Physical Activity  Diet: well balanced diet  Exercise:  \"starting to get back into it, cardio, resistance bands, total gym\"       Depression Screen  PHQ-2/9 Depression Screening    Little interest or pleasure in doing things: 0 - not at all  Feeling down, depressed, or hopeless: 0 - not at all  PHQ-2 Score: 0  PHQ-2 Interpretation: Negative depression screen          General Health  Hearing: Slighty decreased: bilateral, declines Audiology visit at this time   Vision: goes for regular eye exams and wears glasses and contacts  Dental: regular dental visits, brushes teeth twice - three times daily, and flosses teeth daily    Reproductive Health  No issues       The following portions of the patient's history were reviewed and updated as appropriate: allergies, current medications, past family history, past medical history, past social history, past surgical history and problem list.    Review of Systems     Review of Systems    As noted in HPI     Past Medical History     Past Medical History:   Diagnosis Date    Colon polyp     History of ulcerative colitis     IBD (inflammatory bowel disease) " 03/07/2024    Ulcerative pancolitis with rectal bleeding (HCC) 03/07/2024       Past Surgical History     Past Surgical History:   Procedure Laterality Date    COLONOSCOPY      Fiberoptic    DE SURGICAL ARTHROSCOPY SHOULDER W/ROTATOR CUFF RPR Left 05/26/2021    Procedure: SHOULDER ARTHROSCOPIC ROTATOR CUFF REPAIR, BICEPS TENODESIS, EXTENSIVE DEBRIDEMENT;  Surgeon: Vivek Hurst MD;  Location: [facility];  Service: Orthopedics       Social History     Social History     Socioeconomic History    Marital status: /Civil Union     Spouse name: None    Number of children: None    Years of education: None    Highest education level: None   Occupational History    Occupation:    Tobacco Use    Smoking status: Former     Types: Cigarettes, Cigars    Smokeless tobacco: Never    Tobacco comments:     Quit at 33. Cigar on rare occasion   Vaping Use    Vaping status: Never Used   Substance and Sexual Activity    Alcohol use: Yes     Alcohol/week: 7.0 standard drinks of alcohol     Types: 2 Glasses of wine, 5 Cans of beer per week     Comment: Social    Drug use: Not Currently     Types: Marijuana     Comment: occasionaly    Sexual activity: Yes     Partners: Female   Other Topics Concern    None   Social History Narrative    Drinks coffee     Social Determinants of Health     Financial Resource Strain: Not on file   Food Insecurity: Not on file   Transportation Needs: Not on file   Physical Activity: Not on file   Stress: Not on file   Social Connections: Not on file   Intimate Partner Violence: Not on file   Housing Stability: Not on file       Family History     Family History   Problem Relation Age of Onset    Lung cancer Mother     Cancer Mother     Arthritis Father     COPD Father     Heart disease Family         Cardiac Disorder       Current Medications       Current Outpatient Medications:     Multiple Vitamin (MULTI-VITAMIN PO), Take by mouth daily, Disp: , Rfl:   No current facility-administered  "medications for this visit.    Facility-Administered Medications Ordered in Other Visits:     alteplase (CATHFLO) injection 2 mg, 2 mg, Intracatheter, Q1MIN PRN, Soila Kaur MD     Allergies     Allergies   Allergen Reactions    Other Photosensitivity and Eye Swelling     Patient does not remember which, thinks magnesium for colonoscopy prep.        Objective     /80   Pulse 81   Temp 98.1 °F (36.7 °C) (Temporal)   Ht 5' 8\" (1.727 m)   Wt 74.6 kg (164 lb 6.4 oz)   SpO2 98%   BMI 25.00 kg/m²      Physical Exam  Vitals reviewed.   Constitutional:       General: He is not in acute distress.     Appearance: Normal appearance.   HENT:      Head: Normocephalic and atraumatic.      Right Ear: External ear normal.      Left Ear: External ear normal.      Nose: Nose normal.      Mouth/Throat:      Mouth: Mucous membranes are moist.      Pharynx: Oropharynx is clear.   Eyes:      Extraocular Movements: Extraocular movements intact.      Conjunctiva/sclera: Conjunctivae normal.      Pupils: Pupils are equal, round, and reactive to light.   Cardiovascular:      Rate and Rhythm: Normal rate and regular rhythm.      Pulses: Normal pulses.      Heart sounds: Normal heart sounds.   Pulmonary:      Effort: Pulmonary effort is normal.      Breath sounds: Normal breath sounds.   Abdominal:      General: Abdomen is flat. Bowel sounds are normal.      Palpations: Abdomen is soft.      Tenderness: There is no abdominal tenderness.   Musculoskeletal:      Cervical back: Neck supple.      Right lower leg: No edema.      Left lower leg: No edema.   Skin:     General: Skin is warm and dry.      Capillary Refill: Capillary refill takes less than 2 seconds.   Neurological:      Mental Status: He is alert and oriented to person, place, and time.   Psychiatric:         Mood and Affect: Mood normal.         Behavior: Behavior normal.         Thought Content: Thought content normal.         Judgment: Judgment normal.           No " results found.        Isaías Guevara DO  [facility] [place]

## 2024-07-26 NOTE — PATIENT INSTRUCTIONS
"Patient Education     Routine physical for adults   The Basics   Written by the doctors and editors at [facility]   What is a physical? -- A physical is a routine visit, or \"check-up,\" with your doctor. You might also hear it called a \"wellness visit\" or \"preventive visit.\"  During each visit, the doctor will:   Ask about your physical and mental health   Ask about your habits, behaviors, and lifestyle   Do an exam   Give you vaccines if needed   Talk to you about any medicines you take   Give advice about your health   Answer your questions  Getting regular check-ups is an important part of taking care of your health. It can help your doctor find and treat any problems you have. But it's also important for preventing health problems.  A routine physical is different from a \"sick visit.\" A sick visit is when you see a doctor because of a health concern or problem. Since physicals are scheduled ahead of time, you can think about what you want to ask the doctor.  How often should I get a physical? -- It depends on your age and health. In general, for people age 21 years and older:   If you are younger than 50 years, you might be able to get a physical every 3 years.   If you are 50 years or older, your doctor might recommend a physical every year.  If you have an ongoing health condition, like diabetes or high blood pressure, your doctor will probably want to see you more often.  What happens during a physical? -- In general, each visit will include:   Physical exam - The doctor or nurse will check your height, weight, heart rate, and blood pressure. They will also look at your eyes and ears. They will ask about how you are feeling and whether you have any symptoms that bother you.   Medicines - It's a good idea to bring a list of all the medicines you take to each doctor visit. Your doctor will talk to you about your medicines and answer any questions. Tell them if you are having any side effects that bother you. You " "should also tell them if you are having trouble paying for any of your medicines.   Habits and behaviors - This includes:   Your diet   Your exercise habits   Whether you smoke, drink alcohol, or use drugs   Whether you are sexually active   Whether you feel safe at home  Your doctor will talk to you about things you can do to improve your health and lower your risk of health problems. They will also offer help and support. For example, if you want to quit smoking, they can give you advice and might prescribe medicines. If you want to improve your diet or get more physical activity, they can help you with this, too.   Lab tests, if needed - The tests you get will depend on your age and situation. For example, your doctor might want to check your:   Cholesterol   Blood sugar   Iron level   Vaccines - The recommended vaccines will depend on your age, health, and what vaccines you already had. Vaccines are very important because they can prevent certain serious or deadly infections.   Discussion of screening - \"Screening\" means checking for diseases or other health problems before they cause symptoms. Your doctor can recommend screening based on your age, risk, and preferences. This might include tests to check for:   Cancer, such as breast, prostate, cervical, ovarian, colorectal, prostate, lung, or skin cancer   Sexually transmitted infections, such as chlamydia and gonorrhea   Mental health conditions like depression and anxiety  Your doctor will talk to you about the different types of screening tests. They can help you decide which screenings to have. They can also explain what the results might mean.   Answering questions - The physical is a good time to ask the doctor or nurse questions about your health. If needed, they can refer you to other doctors or specialists, too.  Adults older than 65 years often need other care, too. As you get older, your doctor will talk to you about:   How to prevent falling at " home   Hearing or vision tests   Memory testing   How to take your medicines safely   Making sure that you have the help and support you need at home  All topics are updated as new evidence becomes available and our peer review process is complete.  This topic retrieved from [employer] on: May 02, 2024.  Topic 653754 Version 1.0  Release: 32.4.3 - C32.122  © 2024 UpToDate, Inc[employer] and/or its affiliates. All rights reserved.  Consumer Information Use and Disclaimer   Disclaimer: This generalized information is a limited summary of diagnosis, treatment, and/or medication information. It is not meant to be comprehensive and should be used as a tool to help the user understand and/or assess potential diagnostic and treatment options. It does NOT include all information about conditions, treatments, medications, side effects, or risks that may apply to a specific patient. It is not intended to be medical advice or a substitute for the medical advice, diagnosis, or treatment of a health care provider based on the health care provider's examination and assessment of a patient's specific and unique circumstances. Patients must speak with a health care provider for complete information about their health, medical questions, and treatment options, including any risks or benefits regarding use of medications. This information does not endorse any treatments or medications as safe, effective, or approved for treating a specific patient. Up[employer]Date, Inc. and its affiliates disclaim any warranty or liability relating to this information or the use thereof.The use of this information is governed by the Terms of Use, available at https://www.woltersiMeiguuwer.com/en/know/clinical-effectiveness-terms. 2024© UpToDate, Inc. and its affiliates and/or licensors. All rights reserved.  Copyright   © 2024 Up[employer]Date, Inc. and/or its affiliates. All rights reserved.

## 2024-09-18 ENCOUNTER — HOSPITAL ENCOUNTER (OUTPATIENT)
Dept: INFUSION CENTER | Facility: CLINIC | Age: 59
Discharge: HOME | End: 2024-09-18
Payer: COMMERCIAL

## 2024-09-18 VITALS
SYSTOLIC BLOOD PRESSURE: 131 MMHG | DIASTOLIC BLOOD PRESSURE: 87 MMHG | OXYGEN SATURATION: 96 % | TEMPERATURE: 97.6 F | HEART RATE: 60 BPM

## 2024-09-18 DIAGNOSIS — K51.011 ULCERATIVE PANCOLITIS WITH RECTAL BLEEDING (HCC): Primary | ICD-10-CM

## 2024-09-18 DIAGNOSIS — Z87.19 HISTORY OF ULCERATIVE COLITIS: ICD-10-CM

## 2024-09-18 DIAGNOSIS — K52.9 IBD (INFLAMMATORY BOWEL DISEASE): ICD-10-CM

## 2024-09-18 PROCEDURE — 96365 THER/PROPH/DIAG IV INF INIT: CPT

## 2024-09-18 RX ORDER — SODIUM CHLORIDE 9 MG/ML
20 INJECTION, SOLUTION INTRAVENOUS ONCE
Status: COMPLETED | OUTPATIENT
Start: 2024-09-18 | End: 2024-09-18

## 2024-09-18 RX ORDER — SODIUM CHLORIDE 9 MG/ML
20 INJECTION, SOLUTION INTRAVENOUS ONCE
OUTPATIENT
Start: 2024-09-19

## 2024-09-18 RX ADMIN — VEDOLIZUMAB 300 MG: 300 INJECTION, POWDER, LYOPHILIZED, FOR SOLUTION INTRAVENOUS at 15:40

## 2024-09-18 RX ADMIN — SODIUM CHLORIDE 20 ML/HR: 0.9 INJECTION, SOLUTION INTRAVENOUS at 15:38

## 2024-09-18 NOTE — PROGRESS NOTES
Patient here for Entyvio. Offers no complaints. Currently feeling well and not on any antibiotics. He tolerated his infusion without incident. Next appointment made for 11/13/24 at 1150, declined AVS.

## 2024-10-04 ENCOUNTER — TELEMEDICINE (OUTPATIENT)
Age: 59
End: 2024-10-04
Payer: COMMERCIAL

## 2024-10-04 DIAGNOSIS — D84.821 IMMUNOSUPPRESSION DUE TO DRUG THERAPY  (HCC): ICD-10-CM

## 2024-10-04 DIAGNOSIS — K51.011 ULCERATIVE PANCOLITIS WITH RECTAL BLEEDING (HCC): Primary | ICD-10-CM

## 2024-10-04 DIAGNOSIS — Z79.899 IMMUNOSUPPRESSION DUE TO DRUG THERAPY  (HCC): ICD-10-CM

## 2024-10-04 PROCEDURE — 99214 OFFICE O/P EST MOD 30 MIN: CPT | Performed by: DIETITIAN, REGISTERED

## 2024-10-04 NOTE — PROGRESS NOTES
Virtual Regular Visit  Name: Hi Norman      : 1965      MRN: [id number]  Encounter Provider: Cecilio Lopes PA-C  Encounter Date: 10/4/2024   Encounter department: [facility]    Verification of patient location:    Patient is located at Home in the following state in which I hold an active license [place]    Encounter provider Cecilio Lopes PA-C    The patient was identified by name and date of birth. Hi Norman was informed that this is a telemedicine visit and that the visit is being conducted through the [employer] platform. He agrees to proceed.  My office door was closed. No one else was in the room.  He acknowledged consent and understanding of privacy and security of the video platform. The patient has agreed to participate and understands they can discontinue the visit at any time.    Patient is aware this is a billable service.     Gastroenterology Outpatient Follow-up - Ulcerative Colitis  Hi Norman 59 y.o. male MRN: [id number]  Encounter: [id number]    Hi Norman is a 59 y.o. male with Ulcerative colitis.    Symptom onset:    Diagnosis:  ulcerative colitis  Year of diagnosis:      IBD Summary: Hi Norman has (pan?)-UC diagnosed at age 7.  He was treated with steroids until age 13, then stopped therapy and has been in clinical remission.  Colonoscopy in  showed active disease in the left colon and he was put on budesonide.  He was intolerant to mesalamine.  He has now started vedolizumab in .    Ulcerative Colitis Summary  Macroscopic extent of diseases: pancolitis  Microscopic extent of diseases:  unknown  Has the patient ever been hospitalized for severe disease: Yes        Surgical History  Number of IBD surgeries: 0      First IBD surgery:    Most Recent IBD surgery:    Esophageal:  0    Gastroduodenal:  0    Small bowel resection(s):  0    Ileocolonic resection(s): 0      Colonic resection(s):  0     Ileostomy or colostomy:  no previous ostomy    Complete colectomy:  No         Medications     Year last used Reason for discontinuation   Corticosteroids prior   2023 CR     Thiopurines   never         Methotrexate   never         Infliximab   never         Adalimumab   never         Certolizumab   never         Golimumab   never         Natalizumab   never         Mesalamine prior   2022 AE Diarrhea, bleeding   Sulfasalzine   never         Vedolizumab   never         Ustekinumab   never         Tofacitinib   never         Other biologic   never             Extraintestinal Manifestations    IBD-associated arthropathy No    Uveitis No    Oral aphthous ulcers No   Erythema nodosum No    Pyoderma gangrenosum No    Primary sclerosing cholangitis No    Thrombotic complications No          Cancer / Dysplasia History  History of IBD-associated dysplasia: none    Date of diagnosis (Year):     History of colorectal cancer: No   History of cervical dysplasia: No   History of skin cancer: type unknown         Laboratory Data   Most recent (date) Result   PPD   unknown   Quanitferon gold 2/5/2024 negative   TPMT       Hepatitis A   unknown   HBsAb 2/5/2024 negative   HBcAb 2/5/2024 negative   HBsAg 2/5/2024 negative   HCV Ab   unknown       Imaging / Diagnostic Procedures   Most recent (date) Findings   Colonoscopy or sigmoidoscopy #1 12/5/2023            Ulcers/Erosions  large           Strictures  none           Stricture Severity  N/A           Endoscopic Score Palacios Score:  3 Rutgeert's:  N/A            Findings  (1) Performed multiple forceps biopsies in the mid ascending colon, distal ascending colon, mid transverse colon, mid descending colon and distal descending colon for dysplasia screening  (2) Abnormal mucosa with loss of vascular pattern in the mid transverse colon, distal transverse colon, proximal descending colon, mid descending colon, proximal sigmoid colon and mid sigmoid colon. Multiple pseudopolyps noted  in these areas.  (3) Edematous, erythematous and hemorrhagic mucosa in the distal descending colon, proximal sigmoid colon and mid sigmoid colon, consistent with ulcerative colitis; performed cold forceps biopsy. Significant erythema inflammation and exudate noted in the distal descending, proximal and mid sigmoid colon.  Biopsies taken.  (4) Two benign-appearing pseudopolyps measuring 10-19 mm in the mid transverse colon; performed complete en bloc removal by cold forceps biopsy; completely removed in piecemeal fashion by cold snare and retrieved specimen  (5) Nodular and scarred mucosa in the rectum, consistent with IBD. Patchy areas of flat formation in the distal rectum noted.  These were scattered.  Biopsies taken.  Importance of these lesions are unclear at this time.           Pathology  A. Large Intestine, Right/Ascending Colon, cold bx ascending random hx of UC:  Colon mucosa with focal hyperplastic change  No active colitis, dysplasia or malignancy identified   B. Large Intestine, Transverse Colon, cold bx mid transverse random hx of UC:  Fragments of hyperplastic colon mucosa with edema and  lymphoid aggregates  No active colitis, dysplasia or malignancy identified   C. Large Intestine, Transverse Colon, cold snare/cold bx mid transverse polyp x2:  Polypoid fragments of colon mucosa with hyperplastic change   No active colitis, dysplasia or malignancy identified   D. Large Intestine, Left/Descending Colon, cold bx desending random hx of UC:  Colon mucosa with focal hyperplastic change  No active colitis, dysplasia or malignancy identified    E. Large Intestine, Sigmoid Colon, cold bx proximal/mid sigmoid random erythema hx of uc:  Chronic active colitis with cryptitis, crypt abscess and crypt branching   F. Rectum, cold bx mid/distal rectum random hx of UC thickened fold:  Fragments of hyperplastic colon mucosa with lymphoid aggregates  No active colitis, dysplasia or malignancy identified    Colonoscopy  or sigmoidoscopy #2 9/8/2021            Ulcers/Erosions  no erosions              Strictures  none              Stricture Severity  N/A           Endoscopic Score Palacios Score:  0 Rugeert's:  N/A           Findings  (1) Ten or more polyps measuring 10 mm or larger in the mid ascending colon, mid transverse colon, distal transverse colon, proximal descending colon, mid descending colon, distal descending colon, proximal sigmoid colon and mid sigmoid colon. Multiple pseudopolyps were noted in different area of the colon.  They were removed using a cold snare and sent in separate jars.  (2) Performed multiple biopsies for dysplasia screening in the mid ascending colon, distal ascending colon, proximal transverse colon, mid transverse colon, proximal sigmoid colon, mid sigmoid colon and distal rectum. Random biopsies were obtained because of patient's history of more than 20 years of ulcerative colitis.  Patient has been in remission recently.           Pathology      EGD       Small bowel follow-through       CT enterography       CT without enterography       MRI enterography       Capsule study       DEXA scan   Lowest Z-score:      CXR (for TB)           HPI:   Interval History:  10/4/2024 Follow up with Cecilio Lopes PA-C  Patient states his last infusion was 3 weeks ago and he is feeling very well.  He denies any abdominal pain or fecal urgency.  He is having 1 BM/day on average.  He has already gotten his pneumonia vaccine and plans to get COVID booster, flu shot, and shingles vaccine.  He follows a Mediterranean diet.      6/26/2024 Follow up  Since last visit, he started Uceris and then transition to vedolizumab.  He is much improved and well overall.  So far, he has had 3 vedolizumab loading doses and has already discontinued.  Bowel function is now normal with formed stools.  No major complaints.  He avoids dairy products because they cause worsening diarrhea.  Labs from June 22 reviewed.  CMP was normal with  creatinine 0.94 and normal LFTs.  CBC also normal with hemoglobin 14.6, MCV 93, platelets 234.      3/7/2024 Initial visit  Hi Norman is establishing care with me for longstanding history of ulcerative colitis.  He was diagnosed at age 7 when he developed lower abdominal pain, bloody diarrhea, anorexia, and weight loss to 35 pounds.  He was hospitalized for 2 months and missed fourth grade.  He was treated with a lot of steroids and was on prednisone on and off until age 13.  He is not sure about his disease distribution.  He was never treated with mercaptopurine or mesalamine, as far as he can remember.    At age 13, he was able to taper off steroids and has been off therapy since.  He has not had significant bleeding or flareup since then.  He believes that he has become lactose intolerant recently.  Colonoscopy in 2021 showed that he was in remission.  He says that 1 attempt to maintain him on mesalamine was made, but this caused diarrhea, so he stopped.  His last colonoscopy was in December 2023 and found pseudopolyps as well as active inflammation in the transverse, descending, and sigmoid colon.  There was at least 1 area of severely inflamed and ulcerated mucosa in the sigmoid colon.  Biopsies confirmed chronic active colitis with cryptitis.  CRP on 1/4/2024 was elevated to 356.  At the time of this last colonoscopy, he did not feel like he was in a flare and was having 1-2 soft bowel movements per day.  Based on the colonoscopic findings, he started budesonide 9 mg daily.  Since starting budesonide, he feels that his stools have become more firm and he also has some mild bloating.    Mother and uncle with UC; brother with Crohn's.  No CRC.   Has had COVID vaccines x4; no pneumococcal vaccins.  Sees [facility] per year because of sun exposure - had NMSC on nose.  Quit smoking 20 years ago.  Labs from January reviewed with normal QuantiFERON gold and hepatitis B serologies.  Normal CBC with no anemia.   Normal CMP.  Normal CRP.    Hi reports the following symptoms over the last 7 days:    Stool Frequency normal   Average stools per day: 1   Average liquid stools per day: 0   Consistency of bowel: formed   Awakening from sleep to move bowels? No   Urgency no fecal urgency   Visible blood in stool? none   Abdominal pain? none   General Wellbeing? generally well     Hi also reports the following symptoms in the last month:    Leakage of stool while sleeping? No   Leakage of stool while awake? No       REVIEW OF SYSTEMS:  During the last 7 days, Hi experienced the following symptoms:  Unintentional Weight Loss (in the last month) No   Fever No   Eye irritation No   Mouth sores No   Sore throat No   Chest pain No   Shortness of breath No   Numbness or tingling in hands or feet No   Skin rash No   Pain or swelling in joints No   Bruising or bleeding No   Felt depressed or blue No   Fatigue No   Dysuria No   Please see HPI for additional pertinent review of systems; otherwise remainder of ROS was unremarkable    MEDICATIONS:    Current Outpatient Medications:     Multiple Vitamin (MULTI-VITAMIN PO)    ALLERGIES:  Allergies   Allergen Reactions    Other Photosensitivity and Eye Swelling     Patient does not remember which, thinks magnesium for colonoscopy prep.        OBJECTIVE:  There were no vitals taken for this visit.     PHYSICAL EXAM:    General Appearance:   Alert, cooperative, no distress   HEENT:   Normocephalic, atraumatic, anicteric.     Neck:  Supple, symmetrical, trachea midline   Lungs:   Clear to auscultation bilaterally; no rales, rhonchi or wheezing; respirations unlabored    Heart::   Regular rate and rhythm; no murmur, rub, or gallop.   Abdomen:   Soft, non-distended; normal bowel sounds    Abdominal exam was notable for   tenderness with   mass.     Genitalia:   Deferred    Rectal:   Perirectal assessment                       Extremities:  No cyanosis, clubbing or edema    Pulses:   "2+ and symmetric    Skin:  No jaundice, rashes, or lesions    Lymph nodes:  No palpable cervical lymphadenopathy        Lab Results   Component Value Date    WBC 4.6 06/22/2024    HGB 14.6 06/22/2024    HCT 44.3 06/22/2024    MCV 93.3 06/22/2024     06/22/2024     Lab Results   Component Value Date     02/25/2017    SODIUM 141 06/22/2024    K 4.5 06/22/2024     06/22/2024    CO2 28 06/22/2024    BUN 17 06/22/2024    CREATININE 0.94 06/22/2024    GLUC 94 06/22/2024    CALCIUM 9.4 06/22/2024    AST 24 06/22/2024    ALT 13 06/22/2024    ALKPHOS 61 06/22/2024    PROT 7.2 02/25/2017    TP 6.7 06/22/2024    BILITOT 1.5 (H) 02/25/2017    TBILI 1.4 (H) 06/22/2024    EGFR 93 06/22/2024     Lab Results   Component Value Date    CRP 1.7 05/31/2023     No results found for: \"ZYKVWBHI10\"  No results found for: \"FERRITIN\"    ASSESSMENT AND PLAN:    Hi has a history of macroscopic pancolitis and microscopic unknown ulcerative colitis diagnosed in 1972. Current medical therapy is with vedolizumab. My global assessment is that the clinical disease is currently quiescent.     The 6-point Palacios score was 0 and the 9-point Palacios score was 0.  The short CDAI was 44.      Hi Norman was diagnosed with ulcerative colitis in the 1970s.  He was initially treated with steroids for several years, then was off therapy for decades.  He started having active disease again recently as seen on colonoscopy from December 2023.  He is now on vedolizumab.  He is in clinical remission and feeling well.  1.  Continue vedolizumab 300 mg every 8 weeks.  2.  Recommend colonoscopy in ~12/2024 for evaluation of histopathologic healing.  I discussed informed consent with the patient. The risks/benefits/alternatives of the procedure were discussed with the patient. Risks included, but not limited to, infection, bleeding, perforation, injury to organs in the abdomen, missed lesion and incomplete procedure were discussed. Patient " was agreeable.  3.  Check updated CBC, CMP, CRP, folate, vitamin B12, iron panel, and vitamin D.  4.  Patient will need updated hepatitis and TB screening in 2/2025.  5.  Recommend Mediterranean diet.  6.  He should continue follow-up with dermatology annually due to history of nonmelanoma skin lesions.  7.  I recommend age-appropriate vaccinations as discussed below.    Return to office after colonoscopy.      Health Maintenance Recommendations:  Vaccines & Infections  COVID-19 vaccination and boosters are recommended. There is no evidence that the COVID-19 vaccine would cause an IBD flare.  Avoid live vaccines if on immunosuppressive therapy.  Yearly influenza vaccine (flu shot).  Pneumonia vaccines for patients on immunosuppression. These include Prevnar 20, followed by Pneumovax 23 at least 8 weeks later.  Shingrix vaccine (series of 2 injections) for al patients 65 and older. Patients on tofacitinib or upadacitinib should be vaccinated regardless of age.  If not immune to measles mumps or rubella, MMR vaccine is recommended. However, this is a live vaccine and should be given prior to immunosuppressive therapy.  HPV vaccination as per national guidelines.  Hepatitis A and B vaccinations if not previously vaccinated.  Testing for tuberculosis with QuantiFERON Gold blood test and/or chest xray prior to starting immunosuppressive medications, and then annually    Cancer screening  Dysplasia surveillance for colorectal cancer. Colonoscopy in all patients with extensive colitis (more than 1/3 of the colon involved) who had disease for at least 8 or more years.  Repeat colonoscopy approximately every 12-24 months.  In patients with concurrent primary sclerosing cholangitis, history of dysplasia, or family history of colon cancer, repeat colonoscopy annually.    Females: Pap smear annually for woman on immunosuppression.  Annual dermatologic/skin exam in all patients with IBD, especially those on immunosuppression  with thiopurines or HALIMA inhibitors.    Miscellaneous  DEXA scan, once off steroids for 3 months  Depression screening recommended annually  Routine dental and ophthalmology examinations    Problem List Items Addressed This Visit          Digestive    Ulcerative pancolitis with rectal bleeding (HCC) - Primary    Relevant Orders    Colonoscopy    Iron Panel (Includes Ferritin, Iron Sat%, Iron, and TIBC)    Folate    Vitamin B12    Vitamin D 25 hydroxy     Other Visit Diagnoses       Immunosuppression due to drug therapy  (HCC)        Relevant Orders    Colonoscopy    Iron Panel (Includes Ferritin, Iron Sat%, Iron, and TIBC)    Folate    Vitamin B12    Vitamin D 25 hydroxy            Cecilio Lopes PA-C     Visit Time  Total Visit Duration: 30 minutes

## 2024-10-18 DIAGNOSIS — E55.9 VITAMIN D DEFICIENCY: Primary | ICD-10-CM

## 2024-10-18 LAB
25(OH)D3 SERPL-MCNC: 25 NG/ML (ref 30–100)
FERRITIN SERPL-MCNC: 123 NG/ML (ref 24–380)
FOLATE SERPL-MCNC: 18.7 NG/ML
IRON SATN MFR SERPL: 41 % (CALC) (ref 20–48)
IRON SERPL-MCNC: 153 MCG/DL (ref 50–180)
PSA SERPL-MCNC: 0.59 NG/ML
TIBC SERPL-MCNC: 371 MCG/DL (CALC) (ref 250–425)
VIT B12 SERPL-MCNC: 311 PG/ML (ref 200–1100)

## 2024-10-18 RX ORDER — GINGER ROOT/GINGER ROOT EXT 262.5 MG
1 CAPSULE ORAL DAILY
Qty: 90 TABLET | Refills: 3 | Status: SHIPPED | OUTPATIENT
Start: 2024-10-18

## 2024-11-13 ENCOUNTER — HOSPITAL ENCOUNTER (OUTPATIENT)
Dept: INFUSION CENTER | Facility: CLINIC | Age: 59
Discharge: HOME | End: 2024-11-13
Payer: COMMERCIAL

## 2024-11-13 VITALS
TEMPERATURE: 97.9 F | HEART RATE: 66 BPM | DIASTOLIC BLOOD PRESSURE: 103 MMHG | SYSTOLIC BLOOD PRESSURE: 148 MMHG | RESPIRATION RATE: 18 BRPM | OXYGEN SATURATION: 97 %

## 2024-11-13 DIAGNOSIS — K52.9 IBD (INFLAMMATORY BOWEL DISEASE): ICD-10-CM

## 2024-11-13 DIAGNOSIS — Z87.19 HISTORY OF ULCERATIVE COLITIS: ICD-10-CM

## 2024-11-13 DIAGNOSIS — K51.011 ULCERATIVE PANCOLITIS WITH RECTAL BLEEDING (HCC): Primary | ICD-10-CM

## 2024-11-13 PROCEDURE — 96365 THER/PROPH/DIAG IV INF INIT: CPT

## 2024-11-13 RX ORDER — SODIUM CHLORIDE 9 MG/ML
20 INJECTION, SOLUTION INTRAVENOUS ONCE
Status: COMPLETED | OUTPATIENT
Start: 2024-11-13 | End: 2024-11-13

## 2024-11-13 RX ORDER — SODIUM CHLORIDE 9 MG/ML
20 INJECTION, SOLUTION INTRAVENOUS ONCE
OUTPATIENT
Start: 2025-01-08

## 2024-11-13 RX ADMIN — SODIUM CHLORIDE 20 ML/HR: 9 INJECTION, SOLUTION INTRAVENOUS at 12:01

## 2024-11-13 RX ADMIN — VEDOLIZUMAB 300 MG: 300 INJECTION, POWDER, LYOPHILIZED, FOR SOLUTION INTRAVENOUS at 12:51

## 2024-11-13 NOTE — PROGRESS NOTES
Pt tolerated tx well with no complaints at this time. Per pt, he will schedule his next appt on his way out. Declined AVS.

## 2024-11-20 ENCOUNTER — ANESTHESIA EVENT (OUTPATIENT)
Dept: ANESTHESIOLOGY | Facility: HOSPITAL | Age: 59
End: 2024-11-20

## 2024-11-20 ENCOUNTER — ANESTHESIA (OUTPATIENT)
Dept: ANESTHESIOLOGY | Facility: HOSPITAL | Age: 59
End: 2024-11-20

## 2024-11-25 ENCOUNTER — TELEPHONE (OUTPATIENT)
Age: 59
End: 2024-11-25

## 2024-11-25 NOTE — TELEPHONE ENCOUNTER
Confirming Upcoming Procedure: colonoscopy on 12/04/2024  Physician performing: Dr Kaur  [facility]  Prep: miralax prep     Spoke with patient no question at the moment

## 2024-12-03 RX ORDER — SODIUM CHLORIDE 9 MG/ML
125 INJECTION, SOLUTION INTRAVENOUS CONTINUOUS
Status: CANCELLED | OUTPATIENT
Start: 2024-12-03

## 2024-12-04 ENCOUNTER — ANESTHESIA (OUTPATIENT)
Dept: GASTROENTEROLOGY | Facility: MEDICAL CENTER | Age: 59
End: 2024-12-04
Payer: COMMERCIAL

## 2024-12-04 ENCOUNTER — ANESTHESIA EVENT (OUTPATIENT)
Dept: GASTROENTEROLOGY | Facility: MEDICAL CENTER | Age: 59
End: 2024-12-04
Payer: COMMERCIAL

## 2024-12-04 ENCOUNTER — HOSPITAL ENCOUNTER (OUTPATIENT)
Dept: GASTROENTEROLOGY | Facility: MEDICAL CENTER | Age: 59
Setting detail: OUTPATIENT SURGERY
Discharge: HOME | End: 2024-12-04
Payer: COMMERCIAL

## 2024-12-04 VITALS
HEART RATE: 67 BPM | SYSTOLIC BLOOD PRESSURE: 113 MMHG | OXYGEN SATURATION: 97 % | DIASTOLIC BLOOD PRESSURE: 76 MMHG | RESPIRATION RATE: 20 BRPM | TEMPERATURE: 97 F

## 2024-12-04 DIAGNOSIS — D84.821 IMMUNOSUPPRESSION DUE TO DRUG THERAPY  (HCC): ICD-10-CM

## 2024-12-04 DIAGNOSIS — K51.011 ULCERATIVE PANCOLITIS WITH RECTAL BLEEDING (HCC): ICD-10-CM

## 2024-12-04 DIAGNOSIS — Z79.899 IMMUNOSUPPRESSION DUE TO DRUG THERAPY  (HCC): ICD-10-CM

## 2024-12-04 PROCEDURE — 88305 TISSUE EXAM BY PATHOLOGIST: CPT | Performed by: PATHOLOGY

## 2024-12-04 PROCEDURE — 45380 COLONOSCOPY AND BIOPSY: CPT | Performed by: INTERNAL MEDICINE

## 2024-12-04 RX ORDER — SODIUM CHLORIDE 9 MG/ML
125 INJECTION, SOLUTION INTRAVENOUS CONTINUOUS
Status: DISCONTINUED | OUTPATIENT
Start: 2024-12-04 | End: 2024-12-08 | Stop reason: HOSPADM

## 2024-12-04 RX ORDER — PROPOFOL 10 MG/ML
INJECTION, EMULSION INTRAVENOUS AS NEEDED
Status: DISCONTINUED | OUTPATIENT
Start: 2024-12-04 | End: 2024-12-04

## 2024-12-04 RX ORDER — SODIUM CHLORIDE 9 MG/ML
INJECTION, SOLUTION INTRAVENOUS CONTINUOUS PRN
Status: DISCONTINUED | OUTPATIENT
Start: 2024-12-04 | End: 2024-12-04

## 2024-12-04 RX ADMIN — PROPOFOL 20 MG: 10 INJECTION, EMULSION INTRAVENOUS at 07:26

## 2024-12-04 RX ADMIN — SODIUM CHLORIDE: 0.9 INJECTION, SOLUTION INTRAVENOUS at 07:21

## 2024-12-04 RX ADMIN — PROPOFOL 40 MG: 10 INJECTION, EMULSION INTRAVENOUS at 07:31

## 2024-12-04 RX ADMIN — PROPOFOL 30 MG: 10 INJECTION, EMULSION INTRAVENOUS at 07:43

## 2024-12-04 RX ADMIN — PROPOFOL 40 MG: 10 INJECTION, EMULSION INTRAVENOUS at 07:28

## 2024-12-04 RX ADMIN — Medication 40 MG: at 07:32

## 2024-12-04 RX ADMIN — PROPOFOL 40 MG: 10 INJECTION, EMULSION INTRAVENOUS at 07:34

## 2024-12-04 RX ADMIN — PROPOFOL 100 MG: 10 INJECTION, EMULSION INTRAVENOUS at 07:24

## 2024-12-04 RX ADMIN — PROPOFOL 30 MG: 10 INJECTION, EMULSION INTRAVENOUS at 07:37

## 2024-12-04 RX ADMIN — PROPOFOL 30 MG: 10 INJECTION, EMULSION INTRAVENOUS at 07:51

## 2024-12-04 RX ADMIN — SODIUM CHLORIDE 125 ML/HR: 0.9 INJECTION, SOLUTION INTRAVENOUS at 07:05

## 2024-12-04 RX ADMIN — PROPOFOL 30 MG: 10 INJECTION, EMULSION INTRAVENOUS at 07:47

## 2024-12-04 NOTE — H&P
History and Physical - [facility]  Hi Norman 59 y.o. male MRN: [id number]                  HPI: Hi Norman is a 59 y.o. year old male who presents for UC.      REVIEW OF SYSTEMS: Per the HPI, and otherwise unremarkable.    Historical Information   Past Medical History:   Diagnosis Date    Colon polyp     History of ulcerative colitis     IBD (inflammatory bowel disease) 03/07/2024    Ulcerative pancolitis with rectal bleeding (HCC) 03/07/2024     Past Surgical History:   Procedure Laterality Date    COLONOSCOPY      Fiberoptic    [facility] SURGICAL ARTHROSCOPY SHOULDER W/ROTATOR CUFF RPR Left 05/26/2021    Procedure: SHOULDER ARTHROSCOPIC ROTATOR CUFF REPAIR, BICEPS TENODESIS, EXTENSIVE DEBRIDEMENT;  Surgeon: Vivek Hurst MD;  Location: [facility];  Service: Orthopedics     Social History   Social History     Substance and Sexual Activity   Alcohol Use Yes    Alcohol/week: 7.0 standard drinks of alcohol    Types: 2 Glasses of wine, 5 Cans of beer per week    Comment: Social     Social History     Substance and Sexual Activity   Drug Use Not Currently    Types: Marijuana    Comment: occasionaly     Social History     Tobacco Use   Smoking Status Former    Types: Cigarettes, Cigars   Smokeless Tobacco Never   Tobacco Comments    Quit at 33. Cigar on rare occasion     Family History   Problem Relation Age of Onset    Lung cancer Mother     Cancer Mother     Arthritis Father     COPD Father     Heart disease Family         Cardiac Disorder       Meds/Allergies     Not in a hospital admission.    Allergies   Allergen Reactions    Other Photosensitivity and Eye Swelling     Patient does not remember which, thinks magnesium for colonoscopy prep.        Objective     Blood pressure 163/96, pulse 78, temperature (!) 97 °F (36.1 °C), temperature source Temporal, resp. rate 16, SpO2 99%.      PHYSICAL EXAMINATION:    General Appearance:   Alert, cooperative, no distress   HEENT:   Normocephalic, atraumatic, anicteric. Neck supple, symmetrical, trachea midline.   Lungs:   Equal chest rise and unlabored breathing, normal effort, no coughing.   Cardiovascular:   No visualized JVD.   Abdomen:   No abdominal distension.   Skin:   No jaundice, rashes, or lesions.    Musculoskeletal:   Normal range of motion visualized.   Psych:  Normal affect and normal insight.   Neuro:  Alert and appropriate.           ASSESSMENT/PLAN:  This is a 59 y.o. year old male here for colonoscopy, and he is stable and optimized for his procedure.

## 2024-12-04 NOTE — ANESTHESIA PREPROCEDURE EVALUATION
Procedure:  COLONOSCOPY    Relevant Problems   ANESTHESIA (within normal limits)      CARDIO   (+) Arthralgia of left acromioclavicular joint   (+) Paroxysmal tachycardia (HCC)   (+) Precordial pain      NEURO/PSYCH   (+) Chronic left shoulder pain        Physical Exam    Airway    Mallampati score: II  TM Distance: >3 FB  Neck ROM: full     Dental   No notable dental hx     Cardiovascular      Pulmonary      Other Findings        Anesthesia Plan  ASA Score- 2     Anesthesia Type- IV sedation with anesthesia with ASA Monitors.         Additional Monitors:     Airway Plan:            Plan Factors-Exercise tolerance (METS): >4 METS.    Chart reviewed. EKG reviewed.  Existing labs reviewed. Patient summary reviewed.    Patient is not a current smoker.              Induction- intravenous.    Postoperative Plan-         Informed Consent- Anesthetic plan and risks discussed with patient.  I personally reviewed this patient with the CRNA. Discussed and agreed on the Anesthesia Plan with the CRNA..

## 2024-12-04 NOTE — ANESTHESIA POSTPROCEDURE EVALUATION
Post-Op Assessment Note    CV Status:  Stable    Pain management: adequate       Mental Status:  Sleepy   Hydration Status:  Euvolemic   PONV Controlled:  Controlled   Airway Patency:  Patent  Two or more mitigation strategies used for obstructive sleep apnea   Post Op Vitals Reviewed: Yes    No anethesia notable event occurred.    Staff: Anesthesiologist, CRNA       Last Filed PACU Vitals:  Vitals Value Taken Time   Temp     Pulse     BP     Resp     SpO2         Modified Karen:  Activity: 2 (12/4/2024  6:54 AM)  Respiration: 2 (12/4/2024  6:54 AM)  Circulation: 2 (12/4/2024  6:54 AM)  Consciousness: 2 (12/4/2024  6:54 AM)  Oxygen Saturation: 2 (12/4/2024  6:54 AM)  Modified Karen Score: 10 (12/4/2024  6:54 AM)

## 2024-12-09 PROCEDURE — 88305 TISSUE EXAM BY PATHOLOGIST: CPT | Performed by: PATHOLOGY

## 2024-12-10 ENCOUNTER — RESULTS FOLLOW-UP (OUTPATIENT)
Age: 59
End: 2024-12-10

## 2025-01-08 ENCOUNTER — HOSPITAL ENCOUNTER (OUTPATIENT)
Dept: INFUSION CENTER | Facility: CLINIC | Age: 60
Discharge: HOME | End: 2025-01-08
Payer: COMMERCIAL

## 2025-01-08 VITALS
OXYGEN SATURATION: 98 % | DIASTOLIC BLOOD PRESSURE: 88 MMHG | HEART RATE: 67 BPM | RESPIRATION RATE: 18 BRPM | TEMPERATURE: 97 F | SYSTOLIC BLOOD PRESSURE: 130 MMHG

## 2025-01-08 DIAGNOSIS — Z87.19 HISTORY OF ULCERATIVE COLITIS: ICD-10-CM

## 2025-01-08 DIAGNOSIS — K52.9 IBD (INFLAMMATORY BOWEL DISEASE): ICD-10-CM

## 2025-01-08 DIAGNOSIS — K51.011 ULCERATIVE PANCOLITIS WITH RECTAL BLEEDING (HCC): Primary | ICD-10-CM

## 2025-01-08 DIAGNOSIS — Z00.6 ENCOUNTER FOR EXAMINATION FOR NORMAL COMPARISON OR CONTROL IN CLINICAL RESEARCH PROGRAM: ICD-10-CM

## 2025-01-08 PROCEDURE — 96365 THER/PROPH/DIAG IV INF INIT: CPT

## 2025-01-08 RX ORDER — SODIUM CHLORIDE 9 MG/ML
20 INJECTION, SOLUTION INTRAVENOUS ONCE
Status: COMPLETED | OUTPATIENT
Start: 2025-01-08 | End: 2025-01-08

## 2025-01-08 RX ORDER — SODIUM CHLORIDE 9 MG/ML
20 INJECTION, SOLUTION INTRAVENOUS ONCE
OUTPATIENT
Start: 2025-03-05

## 2025-01-08 RX ADMIN — VEDOLIZUMAB 300 MG: 300 INJECTION, POWDER, LYOPHILIZED, FOR SOLUTION INTRAVENOUS at 13:30

## 2025-01-08 RX ADMIN — SODIUM CHLORIDE 20 ML/HR: 0.9 INJECTION, SOLUTION INTRAVENOUS at 12:50

## 2025-01-08 NOTE — PROGRESS NOTES
Tolerated infusion without incident: No adverse reactions noted: Verified follow up appt with patient ( 03/05/25 ): AVS offered and declined

## 2025-01-08 NOTE — PROGRESS NOTES
Patient to [facility] for [facility]: Offers no complaints at present time: Lab work ( 10/17/24 ) reviewed: Within parameters to treat: Denies any recent infection / illness: Right FA PIV initiated without incident

## 2025-02-06 ENCOUNTER — TELEMEDICINE (OUTPATIENT)
Age: 60
End: 2025-02-06
Payer: COMMERCIAL

## 2025-02-06 DIAGNOSIS — Z79.899 IMMUNOSUPPRESSION DUE TO DRUG THERAPY  (HCC): ICD-10-CM

## 2025-02-06 DIAGNOSIS — K51.011 ULCERATIVE PANCOLITIS WITH RECTAL BLEEDING (HCC): Primary | ICD-10-CM

## 2025-02-06 DIAGNOSIS — D84.821 IMMUNOSUPPRESSION DUE TO DRUG THERAPY  (HCC): ICD-10-CM

## 2025-02-06 DIAGNOSIS — E55.9 VITAMIN D DEFICIENCY: ICD-10-CM

## 2025-02-06 PROCEDURE — 99214 OFFICE O/P EST MOD 30 MIN: CPT | Performed by: INTERNAL MEDICINE

## 2025-02-06 NOTE — PROGRESS NOTES
Virtual Regular Visit  Verification of patient location:  Patient is located at Home in the following state in which I hold an active license: [place].    The patient was identified by name and date of birth. Hi Norman was informed that this is a telemedicine visit and that the visit is being conducted through the [employer] platform. Hi Norman agrees to proceed. My office door was closed. No one else was in the room. Hi Norman acknowledged consent and understanding of privacy and security of the video platform. The patient has agreed to participate and understands they can discontinue the visit at any time.  Patient is aware this is a billable service.     Gastroenterology Outpatient Follow-up - Ulcerative Colitis  Hi Norman 59 y.o. male MRN: [id number]  Encounter: [id number]    Hi Norman is a 59 y.o. male with Ulcerative colitis.    Symptom onset:  1972  Diagnosis:  ulcerative colitis  Year of diagnosis:  1972    IBD Summary: Hi Norman has (pan?)-UC diagnosed at age 7.  He was treated with steroids until age 13, then stopped therapy and has been in clinical remission.  Colonoscopy in 2023 showed active disease in the left colon and he was put on budesonide.  He was intolerant to mesalamine.  He has now started vedolizumab in 2024.    Ulcerative Colitis Summary  Macroscopic extent of diseases: pancolitis  Microscopic extent of diseases:  unknown  Has the patient ever been hospitalized for severe disease: Yes        Surgical History  Number of IBD surgeries: 0      First IBD surgery:    Most Recent IBD surgery:    Esophageal:  0    Gastroduodenal:  0    Small bowel resection(s):  0    Ileocolonic resection(s): 0      Colonic resection(s):  0    Ileostomy or colostomy:  no previous ostomy    Complete colectomy:  No         Medications     Year last used Reason for discontinuation   Corticosteroids prior   2023 CR     Thiopurines   never         Methotrexate   never          Infliximab   never         Adalimumab   never         Certolizumab   never         Golimumab   never         Natalizumab   never         Mesalamine prior   2022 AE Diarrhea, bleeding   Sulfasalzine   never         Vedolizumab   never         Ustekinumab   never         Tofacitinib   never         Other biologic   never             Extraintestinal Manifestations    IBD-associated arthropathy No    Uveitis No    Oral aphthous ulcers No   Erythema nodosum No    Pyoderma gangrenosum No    Primary sclerosing cholangitis No    Thrombotic complications No          Cancer / Dysplasia History  History of IBD-associated dysplasia: none    Date of diagnosis (Year):     History of colorectal cancer: No   History of cervical dysplasia: No   History of skin cancer: type unknown         Laboratory Data   Most recent (date) Result   PPD   unknown   Quanitferon gold 2/5/2024 negative   TPMT       Hepatitis A   unknown   HBsAb 2/5/2024 negative   HBcAb 2/5/2024 negative   HBsAg 2/5/2024 negative   HCV Ab   unknown       Imaging / Diagnostic Procedures   Most recent (date) Findings   Colonoscopy or sigmoidoscopy #1 12/5/2023            Ulcers/Erosions  large           Strictures  none           Stricture Severity  N/A           Endoscopic Score Palacios Score:  3 Rutgeert's:  N/A            Findings  (1) Performed multiple forceps biopsies in the mid ascending colon, distal ascending colon, mid transverse colon, mid descending colon and distal descending colon for dysplasia screening  (2) Abnormal mucosa with loss of vascular pattern in the mid transverse colon, distal transverse colon, proximal descending colon, mid descending colon, proximal sigmoid colon and mid sigmoid colon. Multiple pseudopolyps noted in these areas.  (3) Edematous, erythematous and hemorrhagic mucosa in the distal descending colon, proximal sigmoid colon and mid sigmoid colon, consistent with ulcerative colitis; performed cold forceps biopsy. Significant erythema  inflammation and exudate noted in the distal descending, proximal and mid sigmoid colon.  Biopsies taken.  (4) Two benign-appearing pseudopolyps measuring 10-19 mm in the mid transverse colon; performed complete en bloc removal by cold forceps biopsy; completely removed in piecemeal fashion by cold snare and retrieved specimen  (5) Nodular and scarred mucosa in the rectum, consistent with IBD. Patchy areas of flat formation in the distal rectum noted.  These were scattered.  Biopsies taken.  Importance of these lesions are unclear at this time.           Pathology  A. Large Intestine, Right/Ascending Colon, cold bx ascending random hx of UC:  Colon mucosa with focal hyperplastic change  No active colitis, dysplasia or malignancy identified   B. Large Intestine, Transverse Colon, cold bx mid transverse random hx of UC:  Fragments of hyperplastic colon mucosa with edema and  lymphoid aggregates  No active colitis, dysplasia or malignancy identified   C. Large Intestine, Transverse Colon, cold snare/cold bx mid transverse polyp x2:  Polypoid fragments of colon mucosa with hyperplastic change   No active colitis, dysplasia or malignancy identified   D. Large Intestine, Left/Descending Colon, cold bx desending random hx of UC:  Colon mucosa with focal hyperplastic change  No active colitis, dysplasia or malignancy identified    E. Large Intestine, Sigmoid Colon, cold bx proximal/mid sigmoid random erythema hx of uc:  Chronic active colitis with cryptitis, crypt abscess and crypt branching   F. Rectum, cold bx mid/distal rectum random hx of UC thickened fold:  Fragments of hyperplastic colon mucosa with lymphoid aggregates  No active colitis, dysplasia or malignancy identified    Colonoscopy or sigmoidoscopy #2 12/4/2024            Ulcers/Erosions  no erosions              Strictures  none              Stricture Severity  N/A           Endoscopic Score Palacios Score:  0 Rugeert's:  N/A           Findings  (1) The terminal  ileum appeared normal.  (2) Three semi-pedunculated pseudopolyps measuring smaller than 5 mm in the ascending colon; performed cold forceps biopsy with complete en bloc removal; performed cold snare with complete en bloc removal and retrieved specimen.  (3) Multiple pseudopolyps in the sigmoid colon and rectosigmoid. There were innumerable long, fingerlike pseudopolyps throughout the left colon.  (4) Performed multiple random forceps biopsies at intervals of 10 cm in the terminal ileum and throughout the colon for dysplasia screening.  (5) No active colitis.  PATH:  A. Terminal Ileum, bx terminal ileum h/o UC r/o crohns:  - Unremarkable small bowel mucosa   B. Large Intestine, Cecum, bx cecum h/o UC r/o dysplasia:  - Focal mild crypt architecture distortion  - No cryptitis or crypt abscess seen   C. Colon, bx 80cm h/o UC r/o dysplasia:  - Unremarkable colonic mucosa  - No evidence of acute and chronic colitis seen   D. Colon, polyp x 3 at 80cm colon cold snare:  - Focal mild crypt architecture distortion. See note  - No cryptitis or crypt abscess seen     E. Colon, bx 70cm colon h/o UC r/o dysplasia:  - Unremarkable colonic mucosa  - No evidence of acute and chronic colitis seen  F. Colon, bx 60cm colon h/o UC r/o dysplasia:  - Unremarkable colonic mucosa  - No evidence of acute and chronic colitis seen   G. Colon, bx 50cm colon h/o UC r/o dysplasia:  - Unremarkable colonic mucosa  - No evidence of acute and chronic colitis seen   H. Colon, bx 40cm colon h/o UC r/o dysplasia:  - Unremarkable colonic mucosa  - No evidence of acute and chronic colitis seen   I. Colon, bx 30cm colon h/o UC r/o dysplasia:  - Focal crypt architecture distortion  - No cryptitis or crypt abscess seen    J. Colon, bx 20cm colon h/o UC r/o dysplasia:  - Focal mild crypt architecture distortion  - No cryptitis or crypt abscess seen    K. Rectum, bx rectum h/o UC r/o dysplasia:  - Focal mild crypt architecture distortion  - No cryptitis or  crypt abscess seen             Pathology      EGD       Small bowel follow-through       CT enterography       CT without enterography       MRI enterography       Capsule study       DEXA scan   Lowest Z-score:      CXR (for TB)           HPI:   Interval History:  2/6/2025 Follow up  Luiz doing well.  He is on vedolizumab every 8 weeks.  We did colonoscopy in December, and he was in endoscopic and histologic remission.  GI function is now normal.  No concerns.  He received the Prevnar 20 vaccine.  He sees dermatology regularly for history of nonmelanoma skin cancer.  He is taking a vitamin D supplement.  Iron panel in October was normal.  Vitamin D low at 25.  CBC and CMP from June were normal.     10/4/2024 Follow up with Cecilio Lopes PA-C  Patient states his last infusion was 3 weeks ago and he is feeling very well.  He denies any abdominal pain or fecal urgency.  He is having 1 BM/day on average.  He has already gotten his pneumonia vaccine and plans to get COVID booster, flu shot, and shingles vaccine.  He follows a Mediterranean diet.      6/26/2024 Follow up  Since last visit, he started Uceris and then transition to vedolizumab.  He is much improved and well overall.  So far, he has had 3 vedolizumab loading doses and has already discontinued.  Bowel function is now normal with formed stools.  No major complaints.  He avoids dairy products because they cause worsening diarrhea.  Labs from June 22 reviewed.  CMP was normal with creatinine 0.94 and normal LFTs.  CBC also normal with hemoglobin 14.6, MCV 93, platelets 234.      3/7/2024 Initial visit  Hi Norman is establishing care with me for longstanding history of ulcerative colitis.  He was diagnosed at age 7 when he developed lower abdominal pain, bloody diarrhea, anorexia, and weight loss to 35 pounds.  He was hospitalized for 2 months and missed fourth grade.  He was treated with a lot of steroids and was on prednisone on and off until age 13.   He is not sure about his disease distribution.  He was never treated with mercaptopurine or mesalamine, as far as he can remember.    At age 13, he was able to taper off steroids and has been off therapy since.  He has not had significant bleeding or flareup since then.  He believes that he has become lactose intolerant recently.  Colonoscopy in 2021 showed that he was in remission.  He says that 1 attempt to maintain him on mesalamine was made, but this caused diarrhea, so he stopped.  His last colonoscopy was in December 2023 and found pseudopolyps as well as active inflammation in the transverse, descending, and sigmoid colon.  There was at least 1 area of severely inflamed and ulcerated mucosa in the sigmoid colon.  Biopsies confirmed chronic active colitis with cryptitis.  CRP on 1/4/2024 was elevated to 356.  At the time of this last colonoscopy, he did not feel like he was in a flare and was having 1-2 soft bowel movements per day.  Based on the colonoscopic findings, he started budesonide 9 mg daily.  Since starting budesonide, he feels that his stools have become more firm and he also has some mild bloating.    Mother and uncle with UC; brother with Crohn's.  No CRC.   Has had COVID vaccines x4; no pneumococcal vaccins.  [facility]es [facility] per year because of sun exposure - had NMSC on nose.  Quit smoking 20 years ago.  Labs from January reviewed with normal QuantiFERON gold and hepatitis B serologies.  Normal CBC with no anemia.  Normal CMP.  Normal CRP.    Hi reports the following symptoms over the last 7 days:    Stool Frequency normal   Average stools per day: 1   Average liquid stools per day: 0   Consistency of bowel: formed   Awakening from sleep to move bowels? No   Urgency no fecal urgency   Visible blood in stool? none   Abdominal pain? none   General Wellbeing? generally well     Hi also reports the following symptoms in the last month:    Leakage of stool while sleeping? No   Leakage  of stool while awake? No       REVIEW OF SYSTEMS:  During the last 7 days, Hi experienced the following symptoms:  Unintentional Weight Loss (in the last month) No   Fever No   Eye irritation No   Mouth sores No   Sore throat No   Chest pain No   Shortness of breath No   Numbness or tingling in hands or feet No   Skin rash No   Pain or swelling in joints No   Bruising or bleeding No   Felt depressed or blue No   Fatigue No   Dysuria No   Please see HPI for additional pertinent review of systems; otherwise remainder of ROS was unremarkable    MEDICATIONS:    Current Outpatient Medications:     Calcium Carb-Cholecalciferol 600-20 MG-MCG TABS    Multiple Vitamin (MULTI-VITAMIN PO)    ALLERGIES:  Allergies   Allergen Reactions    Other Photosensitivity and Eye Swelling     Patient does not remember which, thinks magnesium for colonoscopy prep.        OBJECTIVE:  There were no vitals taken for this visit.     PHYSICAL EXAM:    Appearance and vitals taken from home devices  General Appearance:   Alert, cooperative, no distress   HEENT:  Normocephalic, atraumatic, anicteric. Neck supple, symmetrical, trachea midline.   Lungs:   Equal chest rise and unlabored breathing, normal effort, no coughing.   Cardiovascular:   No visualized JVD.   Abdomen:   No abdominal distension.   Skin:   No jaundice, rashes, or lesions.    Musculoskeletal:   Normal range of motion visualized.   Psych:  Normal affect and normal insight.   Neuro:  Alert and appropriate.       Lab Results   Component Value Date    WBC 4.6 06/22/2024    HGB 14.6 06/22/2024    HCT 44.3 06/22/2024    MCV 93.3 06/22/2024     06/22/2024     Lab Results   Component Value Date     02/25/2017    SODIUM 141 06/22/2024    K 4.5 06/22/2024     06/22/2024    CO2 28 06/22/2024    BUN 17 06/22/2024    CREATININE 0.94 06/22/2024    GLUC 94 06/22/2024    CALCIUM 9.4 06/22/2024    AST 24 06/22/2024    ALT 13 06/22/2024    ALKPHOS 61 06/22/2024    PROT 7.2  02/25/2017    TP 6.7 06/22/2024    BILITOT 1.5 (H) 02/25/2017    TBILI 1.4 (H) 06/22/2024    EGFR 93 06/22/2024     Lab Results   Component Value Date    CRP 1.7 05/31/2023     Lab Results   Component Value Date    IZYWFXDE13 311 10/17/2024     Lab Results   Component Value Date    FERRITIN 123 10/17/2024       ASSESSMENT AND PLAN:    Hi has a history of macroscopic pancolitis and microscopic unknown ulcerative colitis diagnosed in 1972. Current medical therapy is with vedolizumab 300 mg every 8 weeks. My global assessment is that the clinical disease is currently quiescent.     The 6-point Palacios score was 0 and the 9-point Palacios score was 0.  The short CDAI was 44.      Lacho has ulcerative pancolitis diagnosed in the 1970s.  He was initially treated with steroids and then was off therapy for many years.  He then flared up in 2023 and is now on vedolizumab.  He is back in clinical and endoscopic remission.  He feels well.  Only lab abnormality was vitamin D deficiency.    1.  Continue vedolizumab 300 mg every 8 weeks.  2.  Continue vitamin D supplements and recheck level.  3.  Continue annual dermatology follow-up and sun avoidance due to history of nonmelanoma skin cancer.  4.  He has received the Prevnar 20 vaccine.  I recommend annual flu and COVID shots.  I also recommend Shingrix if he has not received it yet.  5.  Next colonoscopy in 2026.    Return in 6 months.      Health Maintenance Recommendations:  Vaccines & Infections  COVID-19 vaccination and boosters are recommended. There is no evidence that the COVID-19 vaccine would cause an IBD flare.  Avoid live vaccines if on immunosuppressive therapy.  Yearly influenza vaccine (flu shot).  Pneumonia vaccines for patients on immunosuppression. These include Prevnar 20, followed by Pneumovax 23 at least 8 weeks later.  Shingrix vaccine (series of 2 injections) for al patients 65 and older. Patients on tofacitinib or upadacitinib should be vaccinated  regardless of age.  If not immune to measles mumps or rubella, MMR vaccine is recommended. However, this is a live vaccine and should be given prior to immunosuppressive therapy.  HPV vaccination as per national guidelines.  Hepatitis A and B vaccinations if not previously vaccinated.  Testing for tuberculosis with QuantiFERON Gold blood test and/or chest xray prior to starting immunosuppressive medications, and then annually    Cancer screening  Dysplasia surveillance for colorectal cancer. Colonoscopy in all patients with extensive colitis (more than 1/3 of the colon involved) who had disease for at least 8 or more years.  Repeat colonoscopy approximately every 12-24 months.  In patients with concurrent primary sclerosing cholangitis, history of dysplasia, or family history of colon cancer, repeat colonoscopy annually.    Females: Pap smear annually for woman on immunosuppression.  Annual dermatologic/skin exam in all patients with IBD, especially those on immunosuppression with thiopurines or HALIMA inhibitors.    Miscellaneous  DEXA scan, once off steroids for 3 months  Depression screening recommended annually  Routine dental and ophthalmology examinations    Problem List Items Addressed This Visit    None  Visit Diagnoses         Vitamin D deficiency    -  Primary    Relevant Orders    Vitamin D 25 hydroxy            Soila Kaur MD

## 2025-03-02 ENCOUNTER — RESULTS FOLLOW-UP (OUTPATIENT)
Dept: GASTROENTEROLOGY | Facility: CLINIC | Age: 60
End: 2025-03-02

## 2025-03-02 LAB — 25(OH)D3 SERPL-MCNC: 69 NG/ML (ref 30–100)

## 2025-03-05 ENCOUNTER — HOSPITAL ENCOUNTER (OUTPATIENT)
Dept: INFUSION CENTER | Facility: CLINIC | Age: 60
Discharge: HOME | End: 2025-03-05
Payer: COMMERCIAL

## 2025-03-05 VITALS
TEMPERATURE: 98.3 F | RESPIRATION RATE: 18 BRPM | OXYGEN SATURATION: 99 % | HEART RATE: 79 BPM | SYSTOLIC BLOOD PRESSURE: 139 MMHG | DIASTOLIC BLOOD PRESSURE: 93 MMHG

## 2025-03-05 DIAGNOSIS — Z87.19 HISTORY OF ULCERATIVE COLITIS: ICD-10-CM

## 2025-03-05 DIAGNOSIS — K51.011 ULCERATIVE PANCOLITIS WITH RECTAL BLEEDING (HCC): Primary | ICD-10-CM

## 2025-03-05 DIAGNOSIS — K52.9 IBD (INFLAMMATORY BOWEL DISEASE): ICD-10-CM

## 2025-03-05 PROCEDURE — 96365 THER/PROPH/DIAG IV INF INIT: CPT

## 2025-03-05 RX ORDER — SODIUM CHLORIDE 9 MG/ML
20 INJECTION, SOLUTION INTRAVENOUS ONCE
Status: COMPLETED | OUTPATIENT
Start: 2025-03-05 | End: 2025-03-05

## 2025-03-05 RX ORDER — SODIUM CHLORIDE 9 MG/ML
20 INJECTION, SOLUTION INTRAVENOUS ONCE
OUTPATIENT
Start: 2025-04-30

## 2025-03-05 RX ADMIN — SODIUM CHLORIDE 20 ML/HR: 9 INJECTION, SOLUTION INTRAVENOUS at 11:28

## 2025-03-05 RX ADMIN — VEDOLIZUMAB 300 MG: 300 INJECTION, POWDER, LYOPHILIZED, FOR SOLUTION INTRAVENOUS at 12:00

## 2025-03-05 NOTE — PROGRESS NOTES
Patient here for Entyvio. Vitals stable. Patient denies s/s of infection and Abx use. Patient resting. Call bell with in reach.

## 2025-03-05 NOTE — PROGRESS NOTES
Patient tolerated infusion without complications. Patient aware of next appointment 4/30 at 0800, declined AVS.

## 2025-04-30 ENCOUNTER — HOSPITAL ENCOUNTER (OUTPATIENT)
Dept: INFUSION CENTER | Facility: CLINIC | Age: 60
Discharge: HOME | End: 2025-04-30
Payer: COMMERCIAL

## 2025-04-30 VITALS
OXYGEN SATURATION: 98 % | RESPIRATION RATE: 18 BRPM | TEMPERATURE: 97.5 F | DIASTOLIC BLOOD PRESSURE: 86 MMHG | HEART RATE: 80 BPM | SYSTOLIC BLOOD PRESSURE: 134 MMHG

## 2025-04-30 DIAGNOSIS — Z87.19 HISTORY OF ULCERATIVE COLITIS: ICD-10-CM

## 2025-04-30 DIAGNOSIS — K51.011 ULCERATIVE PANCOLITIS WITH RECTAL BLEEDING (HCC): Primary | ICD-10-CM

## 2025-04-30 DIAGNOSIS — K52.9 IBD (INFLAMMATORY BOWEL DISEASE): ICD-10-CM

## 2025-04-30 PROCEDURE — 96365 THER/PROPH/DIAG IV INF INIT: CPT

## 2025-04-30 RX ORDER — SODIUM CHLORIDE 9 MG/ML
20 INJECTION, SOLUTION INTRAVENOUS ONCE
OUTPATIENT
Start: 2025-06-25

## 2025-04-30 RX ORDER — SODIUM CHLORIDE 9 MG/ML
20 INJECTION, SOLUTION INTRAVENOUS ONCE
Status: COMPLETED | OUTPATIENT
Start: 2025-04-30 | End: 2025-04-30

## 2025-04-30 RX ADMIN — SODIUM CHLORIDE 20 ML/HR: 0.9 INJECTION, SOLUTION INTRAVENOUS at 08:15

## 2025-04-30 RX ADMIN — VEDOLIZUMAB 300 MG: 300 INJECTION, POWDER, LYOPHILIZED, FOR SOLUTION INTRAVENOUS at 08:41

## 2025-04-30 NOTE — PROGRESS NOTES
Pt to clinic for entyvio infusion. Pt denies any recent illness or antibiotic use. Call bell in reach

## 2025-06-25 ENCOUNTER — HOSPITAL ENCOUNTER (OUTPATIENT)
Dept: INFUSION CENTER | Facility: CLINIC | Age: 60
Discharge: HOME | End: 2025-06-25
Attending: INTERNAL MEDICINE
Payer: COMMERCIAL

## 2025-06-25 VITALS
OXYGEN SATURATION: 98 % | SYSTOLIC BLOOD PRESSURE: 125 MMHG | DIASTOLIC BLOOD PRESSURE: 82 MMHG | RESPIRATION RATE: 18 BRPM | TEMPERATURE: 97.8 F | HEART RATE: 66 BPM

## 2025-06-25 DIAGNOSIS — Z87.19 HISTORY OF ULCERATIVE COLITIS: ICD-10-CM

## 2025-06-25 DIAGNOSIS — K51.011 ULCERATIVE PANCOLITIS WITH RECTAL BLEEDING (HCC): Primary | ICD-10-CM

## 2025-06-25 DIAGNOSIS — K52.9 IBD (INFLAMMATORY BOWEL DISEASE): ICD-10-CM

## 2025-06-25 PROCEDURE — 96365 THER/PROPH/DIAG IV INF INIT: CPT

## 2025-06-25 RX ORDER — SODIUM CHLORIDE 9 MG/ML
20 INJECTION, SOLUTION INTRAVENOUS ONCE
Status: COMPLETED | OUTPATIENT
Start: 2025-06-25 | End: 2025-06-25

## 2025-06-25 RX ORDER — SODIUM CHLORIDE 9 MG/ML
20 INJECTION, SOLUTION INTRAVENOUS ONCE
OUTPATIENT
Start: 2025-08-20

## 2025-06-25 RX ADMIN — SODIUM CHLORIDE 20 ML/HR: 0.9 INJECTION, SOLUTION INTRAVENOUS at 09:48

## 2025-06-25 RX ADMIN — VEDOLIZUMAB 300 MG: 300 INJECTION, POWDER, LYOPHILIZED, FOR SOLUTION INTRAVENOUS at 09:51

## 2025-06-25 NOTE — PROGRESS NOTES
Patient tolerated treatment well with no adverse reactions. Declined AVS. Confirmed next appointment at the [facility] for 08/20/2025 @ 2886. Patient ambulatory at discharge.

## 2025-06-25 NOTE — PROGRESS NOTES
Pt at [facility] for Entyvio infusion. Pt denies any recent s/s of illness or infection and denies any antibiotic use. Pt does have a rash on left forerarm, he says he noticed it on Sunday and itches. Rash is the size of a quarter, pinkish/red in color with small non pustule bumps.Office notified and Dr. Kaur gave the okay to proceed with tx. Pt has no further questions at this time. Call bell within reach.

## 2025-07-24 ENCOUNTER — RA CDI HCC (OUTPATIENT)
Dept: OTHER | Facility: HOSPITAL | Age: 60
End: 2025-07-24

## 2025-07-24 NOTE — PROGRESS NOTES
HCC coding opportunities       Chart reviewed, no opportunity found: CHART REVIEWED, NO OPPORTUNITY FOUND        Patients Insurance        Commercial Insurance: [employer]

## 2025-07-31 ENCOUNTER — OFFICE VISIT (OUTPATIENT)
Dept: FAMILY MEDICINE CLINIC | Facility: MEDICAL CENTER | Age: 60
End: 2025-07-31
Payer: COMMERCIAL

## 2025-07-31 VITALS
OXYGEN SATURATION: 99 % | TEMPERATURE: 98.4 F | SYSTOLIC BLOOD PRESSURE: 128 MMHG | HEIGHT: 68 IN | WEIGHT: 157.2 LBS | DIASTOLIC BLOOD PRESSURE: 70 MMHG | BODY MASS INDEX: 23.82 KG/M2 | HEART RATE: 72 BPM | RESPIRATION RATE: 18 BRPM

## 2025-07-31 DIAGNOSIS — Z00.00 ANNUAL PHYSICAL EXAM: Primary | ICD-10-CM

## 2025-07-31 DIAGNOSIS — Z12.2 SCREENING FOR LUNG CANCER: ICD-10-CM

## 2025-07-31 DIAGNOSIS — Z79.899 IMMUNOSUPPRESSION DUE TO DRUG THERAPY  (HCC): ICD-10-CM

## 2025-07-31 DIAGNOSIS — D84.821 IMMUNOSUPPRESSION DUE TO DRUG THERAPY  (HCC): ICD-10-CM

## 2025-07-31 DIAGNOSIS — I47.9 PAROXYSMAL TACHYCARDIA (HCC): ICD-10-CM

## 2025-07-31 DIAGNOSIS — Z12.5 SCREENING PSA (PROSTATE SPECIFIC ANTIGEN): ICD-10-CM

## 2025-07-31 DIAGNOSIS — E78.5 HYPERLIPIDEMIA, UNSPECIFIED HYPERLIPIDEMIA TYPE: ICD-10-CM

## 2025-07-31 PROCEDURE — 99396 PREV VISIT EST AGE 40-64: CPT | Performed by: STUDENT IN AN ORGANIZED HEALTH CARE EDUCATION/TRAINING PROGRAM

## 2025-08-06 LAB
CHOLEST SERPL-MCNC: 185 MG/DL
CHOLEST/HDLC SERPL: 2.8 (CALC)
HDLC SERPL-MCNC: 65 MG/DL
LDLC SERPL CALC-MCNC: 103 MG/DL (CALC)
NONHDLC SERPL-MCNC: 120 MG/DL (CALC)
PSA SERPL-MCNC: 0.62 NG/ML
TRIGL SERPL-MCNC: 78 MG/DL

## (undated) DEVICE — ADHESIVE SKIN HIGH VISCOSITY EXOFIN 1ML

## (undated) DEVICE — GLOVE SRG BIOGEL ECLIPSE 7

## (undated) DEVICE — SPONGE PVP SCRUB WING STERILE

## (undated) DEVICE — TUBING SUCTION 5MM X 12 FT

## (undated) DEVICE — BLADE SHAVER DISSECTOR 4MM 13CM COOLCUT

## (undated) DEVICE — GLOVE INDICATOR PI UNDERGLOVE SZ 7.5 BLUE

## (undated) DEVICE — THREADED CLEAR CANNULA WITH OBTURATOR 7MM X 75MM

## (undated) DEVICE — SHOULDER SUSPENSION KIT 6 PER BOX

## (undated) DEVICE — BLADE SHAVER DISSECTOR 3.5MM 13CM COOLCUT

## (undated) DEVICE — PACK PBDS SHOULDER ARTHROSCOPY RF

## (undated) DEVICE — VAPR COOLPULSE 90 ELECTRODE 90 DEGREES SUCTION WITH INTEGRATED HANDPIECE: Brand: VAPR COOLPULSE

## (undated) DEVICE — DISPOSABLE EQUIPMENT COVER: Brand: SMALL TOWEL DRAPE

## (undated) DEVICE — 3M™ IOBAN™ 2 ANTIMICROBIAL INCISE DRAPE 6650EZ: Brand: IOBAN™ 2

## (undated) DEVICE — DRESSING MEPILEX AG BORDER 4 X 4 IN

## (undated) DEVICE — SUT MONOCRYL 4-0 PS-2 18 IN Y496G

## (undated) DEVICE — THREADED CLEAR CANNULA WITH OBTURATOR 8.5MM X 75MM

## (undated) DEVICE — GLOVE SRG BIOGEL 7.5

## (undated) DEVICE — INTENDED FOR TISSUE SEPARATION, AND OTHER PROCEDURES THAT REQUIRE A SHARP SURGICAL BLADE TO PUNCTURE OR CUT.: Brand: BARD-PARKER SAFETY BLADES SIZE 11, STERILE